# Patient Record
Sex: FEMALE | Race: WHITE | NOT HISPANIC OR LATINO | Employment: OTHER | URBAN - METROPOLITAN AREA
[De-identification: names, ages, dates, MRNs, and addresses within clinical notes are randomized per-mention and may not be internally consistent; named-entity substitution may affect disease eponyms.]

---

## 2022-11-11 ENCOUNTER — HOSPITAL ENCOUNTER (EMERGENCY)
Facility: HOSPITAL | Age: 67
Discharge: HOME | End: 2022-11-11
Attending: FAMILY MEDICINE
Payer: MEDICARE

## 2022-11-11 VITALS
HEIGHT: 62 IN | BODY MASS INDEX: 34.04 KG/M2 | HEART RATE: 79 BPM | DIASTOLIC BLOOD PRESSURE: 85 MMHG | OXYGEN SATURATION: 99 % | SYSTOLIC BLOOD PRESSURE: 135 MMHG | TEMPERATURE: 98 F | RESPIRATION RATE: 18 BRPM | WEIGHT: 185 LBS

## 2022-11-11 DIAGNOSIS — D64.9 ANEMIA, UNSPECIFIED TYPE: Primary | ICD-10-CM

## 2022-11-11 DIAGNOSIS — K92.2 GASTROINTESTINAL HEMORRHAGE, UNSPECIFIED GASTROINTESTINAL HEMORRHAGE TYPE: ICD-10-CM

## 2022-11-11 DIAGNOSIS — R06.02 SOB (SHORTNESS OF BREATH): ICD-10-CM

## 2022-11-11 DIAGNOSIS — R19.5 HEME POSITIVE STOOL: ICD-10-CM

## 2022-11-11 LAB
ABO + RH BLD: NORMAL
ALBUMIN SERPL BCP-MCNC: 3 G/DL (ref 3.5–5.2)
ALP SERPL-CCNC: 66 U/L (ref 55–135)
ALT SERPL W/O P-5'-P-CCNC: 33 U/L (ref 10–44)
ANION GAP SERPL CALC-SCNC: 14 MMOL/L (ref 8–16)
AST SERPL-CCNC: 77 U/L (ref 10–40)
BASOPHILS # BLD AUTO: 0.04 K/UL (ref 0–0.2)
BASOPHILS NFR BLD: 0.7 % (ref 0–1.9)
BILIRUB SERPL-MCNC: 1.1 MG/DL (ref 0.1–1)
BLD GP AB SCN CELLS X3 SERPL QL: NORMAL
BLD PROD TYP BPU: NORMAL
BLOOD UNIT EXPIRATION DATE: NORMAL
BLOOD UNIT TYPE CODE: 600
BLOOD UNIT TYPE: NORMAL
BNP SERPL-MCNC: 96 PG/ML (ref 0–99)
BUN SERPL-MCNC: 15 MG/DL (ref 8–23)
CALCIUM SERPL-MCNC: 9 MG/DL (ref 8.7–10.5)
CHLORIDE SERPL-SCNC: 104 MMOL/L (ref 95–110)
CO2 SERPL-SCNC: 20 MMOL/L (ref 23–29)
CODING SYSTEM: NORMAL
CREAT SERPL-MCNC: 1.6 MG/DL (ref 0.5–1.4)
DIFFERENTIAL METHOD: ABNORMAL
DISPENSE STATUS: NORMAL
EOSINOPHIL # BLD AUTO: 0.2 K/UL (ref 0–0.5)
EOSINOPHIL NFR BLD: 2.5 % (ref 0–8)
ERYTHROCYTE [DISTWIDTH] IN BLOOD BY AUTOMATED COUNT: 18.5 % (ref 11.5–14.5)
EST. GFR  (NO RACE VARIABLE): 34.7 ML/MIN/1.73 M^2
GLUCOSE SERPL-MCNC: 104 MG/DL (ref 70–110)
HCT VFR BLD AUTO: 21.8 % (ref 37–48.5)
HCV AB SERPL QL IA: NORMAL
HGB BLD-MCNC: 7.1 G/DL (ref 12–16)
HIV 1+2 AB+HIV1 P24 AG SERPL QL IA: NORMAL
IMM GRANULOCYTES # BLD AUTO: 0.03 K/UL (ref 0–0.04)
IMM GRANULOCYTES NFR BLD AUTO: 0.5 % (ref 0–0.5)
INR PPP: 1 (ref 0.8–1.2)
LYMPHOCYTES # BLD AUTO: 0.7 K/UL (ref 1–4.8)
LYMPHOCYTES NFR BLD: 11.5 % (ref 18–48)
MCH RBC QN AUTO: 34.3 PG (ref 27–31)
MCHC RBC AUTO-ENTMCNC: 32.6 G/DL (ref 32–36)
MCV RBC AUTO: 105 FL (ref 82–98)
MONOCYTES # BLD AUTO: 0.6 K/UL (ref 0.3–1)
MONOCYTES NFR BLD: 9.5 % (ref 4–15)
NEUTROPHILS # BLD AUTO: 4.5 K/UL (ref 1.8–7.7)
NEUTROPHILS NFR BLD: 75.3 % (ref 38–73)
NRBC BLD-RTO: 0 /100 WBC
NUM UNITS TRANS PACKED RBC: NORMAL
OB PNL STL: POSITIVE
PLATELET # BLD AUTO: 128 K/UL (ref 150–450)
PMV BLD AUTO: 9.1 FL (ref 9.2–12.9)
POTASSIUM SERPL-SCNC: 4.5 MMOL/L (ref 3.5–5.1)
PROT SERPL-MCNC: 6.6 G/DL (ref 6–8.4)
PROTHROMBIN TIME: 10.7 SEC (ref 9–12.5)
RBC # BLD AUTO: 2.07 M/UL (ref 4–5.4)
SODIUM SERPL-SCNC: 138 MMOL/L (ref 136–145)
TROPONIN I SERPL DL<=0.01 NG/ML-MCNC: <0.006 NG/ML (ref 0–0.03)
WBC # BLD AUTO: 5.91 K/UL (ref 3.9–12.7)

## 2022-11-11 PROCEDURE — 71045 X-RAY EXAM CHEST 1 VIEW: CPT | Mod: TC

## 2022-11-11 PROCEDURE — 82272 OCCULT BLD FECES 1-3 TESTS: CPT | Performed by: FAMILY MEDICINE

## 2022-11-11 PROCEDURE — 86901 BLOOD TYPING SEROLOGIC RH(D): CPT | Performed by: FAMILY MEDICINE

## 2022-11-11 PROCEDURE — 71045 X-RAY EXAM CHEST 1 VIEW: CPT | Mod: 26,,, | Performed by: RADIOLOGY

## 2022-11-11 PROCEDURE — 99285 EMERGENCY DEPT VISIT HI MDM: CPT | Mod: 25

## 2022-11-11 PROCEDURE — 83880 ASSAY OF NATRIURETIC PEPTIDE: CPT | Performed by: FAMILY MEDICINE

## 2022-11-11 PROCEDURE — 84484 ASSAY OF TROPONIN QUANT: CPT | Performed by: FAMILY MEDICINE

## 2022-11-11 PROCEDURE — 93005 ELECTROCARDIOGRAM TRACING: CPT

## 2022-11-11 PROCEDURE — 36000 PLACE NEEDLE IN VEIN: CPT | Mod: 59

## 2022-11-11 PROCEDURE — 36415 COLL VENOUS BLD VENIPUNCTURE: CPT | Performed by: FAMILY MEDICINE

## 2022-11-11 PROCEDURE — 87389 HIV-1 AG W/HIV-1&-2 AB AG IA: CPT | Performed by: FAMILY MEDICINE

## 2022-11-11 PROCEDURE — 93010 ELECTROCARDIOGRAM REPORT: CPT | Mod: ,,, | Performed by: INTERNAL MEDICINE

## 2022-11-11 PROCEDURE — 86920 COMPATIBILITY TEST SPIN: CPT | Performed by: FAMILY MEDICINE

## 2022-11-11 PROCEDURE — 85610 PROTHROMBIN TIME: CPT | Performed by: FAMILY MEDICINE

## 2022-11-11 PROCEDURE — P9016 RBC LEUKOCYTES REDUCED: HCPCS | Performed by: FAMILY MEDICINE

## 2022-11-11 PROCEDURE — 71045 XR CHEST AP PORTABLE: ICD-10-PCS | Mod: 26,,, | Performed by: RADIOLOGY

## 2022-11-11 PROCEDURE — 36430 TRANSFUSION BLD/BLD COMPNT: CPT

## 2022-11-11 PROCEDURE — 80053 COMPREHEN METABOLIC PANEL: CPT | Performed by: FAMILY MEDICINE

## 2022-11-11 PROCEDURE — 86803 HEPATITIS C AB TEST: CPT | Performed by: FAMILY MEDICINE

## 2022-11-11 PROCEDURE — 85025 COMPLETE CBC W/AUTO DIFF WBC: CPT | Performed by: FAMILY MEDICINE

## 2022-11-11 PROCEDURE — 93010 EKG 12-LEAD: ICD-10-PCS | Mod: ,,, | Performed by: INTERNAL MEDICINE

## 2022-11-11 RX ORDER — HYDROCODONE BITARTRATE AND ACETAMINOPHEN 500; 5 MG/1; MG/1
TABLET ORAL
Status: DISCONTINUED | OUTPATIENT
Start: 2022-11-11 | End: 2022-11-11 | Stop reason: HOSPADM

## 2022-11-11 RX ORDER — LEVOTHYROXINE SODIUM 150 UG/1
150 TABLET ORAL
COMMUNITY
Start: 2022-06-24 | End: 2023-05-04 | Stop reason: SDUPTHER

## 2022-11-11 RX ORDER — PANTOPRAZOLE SODIUM 40 MG/1
40 TABLET, DELAYED RELEASE ORAL 2 TIMES DAILY
COMMUNITY
Start: 2021-12-13 | End: 2023-12-04 | Stop reason: SDUPTHER

## 2022-11-11 RX ORDER — ASPIRIN 81 MG/1
81 TABLET ORAL
COMMUNITY

## 2022-11-11 RX ORDER — FERROUS GLUCONATE 324(38)MG
324 TABLET ORAL
COMMUNITY
End: 2023-05-04

## 2022-11-11 RX ORDER — ERGOCALCIFEROL 1.25 MG/1
50000 CAPSULE ORAL
COMMUNITY

## 2022-11-11 RX ORDER — POTASSIUM CHLORIDE 750 MG/1
10 TABLET, EXTENDED RELEASE ORAL 2 TIMES DAILY
COMMUNITY
Start: 2022-06-24

## 2022-11-11 NOTE — ED TRIAGE NOTES
[facility], [facility] states that she had lab work done last week. Pt states that the her doctors office told her that she needed to come to the hospital pt states that she had to get some stuff completed before coming. Pt states that she is having weakness. Pt denies any other symptoms at this time.

## 2022-11-11 NOTE — ED PROVIDER NOTES
"Encounter Date: 11/11/2022       History     Chief Complaint   Patient presents with    abnormal labs     67-year-old female presents the ED per EMS status post being told she had abnormal labs is in the [facility] area and was at doctor's office in [place], [place], she states that she is here for blood transfusion "and nothing else", she denies any hematemesis melena "I do have hemorrhoid" she denies any abdominal pain shortness of breath chest pain syncope or near-syncope    Review of patient's allergies indicates:   Allergen Reactions    Penicillins Other (See Comments) and Swelling     Other reaction(s): Unknown  Unknown      Carbidopa-levodopa Nausea And Vomiting    Meperidine Nausea And Vomiting    Morphine Nausea And Vomiting     No past medical history on file.  No past surgical history on file.  No family history on file.     Review of Systems   Constitutional:  Negative for fever.   HENT:  Negative for sore throat.    Respiratory:  Negative for shortness of breath.    Cardiovascular:  Negative for chest pain.   Gastrointestinal:  Negative for nausea.   Genitourinary:  Negative for dysuria.   Musculoskeletal:  Negative for back pain.   Skin:  Negative for rash.   Neurological:  Negative for weakness.   Hematological:  Does not bruise/bleed easily.     Physical Exam     Initial Vitals   BP Pulse Resp Temp SpO2   11/11/22 1003 11/11/22 1003 11/11/22 0957 11/11/22 0957 11/11/22 1003   (!) 118/38 87 18 98.1 °F (36.7 °C) 99 %      MAP       --                Physical Exam    Nursing note and vitals reviewed.  Constitutional: She appears well-developed and well-nourished. She is not diaphoretic. No distress.   HENT:   Head: Normocephalic and atraumatic.   Eyes: Pupils are equal, round, and reactive to light. Right eye exhibits no discharge. Left eye exhibits no discharge.   Neck: No tracheal deviation present. No JVD present.   Cardiovascular:      Exam reveals no friction rub.       No murmur " heard.  Pulmonary/Chest: No stridor. No respiratory distress. She has no wheezes. She has no rales.   Abdominal: Bowel sounds are normal. She exhibits no distension.   Musculoskeletal:         General: Normal range of motion.     Neurological: She is alert.   Skin: Skin is warm.   Psychiatric: She has a normal mood and affect.       ED Course   Procedures  Labs Reviewed   CBC W/ AUTO DIFFERENTIAL - Abnormal; Notable for the following components:       Result Value    RBC 2.07 (*)     Hemoglobin 7.1 (*)     Hematocrit 21.8 (*)      (*)     MCH 34.3 (*)     RDW 18.5 (*)     Platelets 128 (*)     MPV 9.1 (*)     Lymph # 0.7 (*)     Gran % 75.3 (*)     Lymph % 11.5 (*)     All other components within normal limits   COMPREHENSIVE METABOLIC PANEL - Abnormal; Notable for the following components:    CO2 20 (*)     Creatinine 1.6 (*)     Albumin 3.0 (*)     Total Bilirubin 1.1 (*)     AST 77 (*)     eGFR 34.7 (*)     All other components within normal limits   OCCULT BLOOD X 1, STOOL - Abnormal; Notable for the following components:    Occult Blood Positive (*)     All other components within normal limits   B-TYPE NATRIURETIC PEPTIDE   TROPONIN I   PROTIME-INR   HIV 1 / 2 ANTIBODY   HEPATITIS C ANTIBODY   TYPE & SCREEN   PREPARE RBC SOFT          Imaging Results              X-Ray Chest AP Portable (Final result)  Result time 11/11/22 10:43:14      Final result by Jen Durán MD (11/11/22 10:43:14)                   Impression:      No acute abnormality.      Electronically signed by: Jen Durán  Date:    11/11/2022  Time:    10:43               Narrative:    EXAMINATION:  XR CHEST AP PORTABLE    CLINICAL HISTORY:  sob;    TECHNIQUE:  Single frontal view of the chest was performed.    COMPARISON:  None    FINDINGS:  The lungs are clear, with normal appearance of pulmonary vasculature and no pleural effusion or pneumothorax.    The cardiac silhouette is normal in size. The hilar and mediastinal  "contours are unremarkable.    Bones are intact.                                       Medications   0.9%  NaCl infusion (for blood administration) (has no administration in time range)                 ED Course as of 11/11/22 1423   Fri Nov 11, 2022   1221 I discussed with the patient that she had a positive Hemoccult and likely had some level of GI bleeding "I only want to get blood", patient says she will take the blood but refuses to be admitted under any circumstance, "I will not sign out AMA either as they will not pay for the bill" [WK]      ED Course User Index  [WK] Ki Guallpa MD                 Clinical Impression:   Final diagnoses:  [R06.02] SOB (shortness of breath)  [D64.9] Anemia, unspecified type (Primary)  [K92.2] Gastrointestinal hemorrhage, unspecified gastrointestinal hemorrhage type  [R19.5] Heme positive stool        ED Disposition Condition    Discharge Stable          ED Prescriptions    None       Follow-up Information    None          Ki Guallpa MD  11/14/22 1052       Ki Guallpa MD  12/02/22 2335       Ki Guallpa MD  12/02/22 2335    "

## 2023-02-10 ENCOUNTER — OFFICE VISIT (OUTPATIENT)
Dept: FAMILY MEDICINE | Facility: CLINIC | Age: 68
End: 2023-02-10
Payer: MEDICARE

## 2023-02-10 VITALS
OXYGEN SATURATION: 100 % | SYSTOLIC BLOOD PRESSURE: 134 MMHG | HEIGHT: 62 IN | WEIGHT: 179.63 LBS | BODY MASS INDEX: 33.06 KG/M2 | DIASTOLIC BLOOD PRESSURE: 80 MMHG | RESPIRATION RATE: 15 BRPM | HEART RATE: 70 BPM

## 2023-02-10 DIAGNOSIS — K21.9 GASTROESOPHAGEAL REFLUX DISEASE WITHOUT ESOPHAGITIS: ICD-10-CM

## 2023-02-10 DIAGNOSIS — G20.A1 PARKINSON'S DISEASE: Primary | ICD-10-CM

## 2023-02-10 DIAGNOSIS — Z12.12 SCREENING FOR COLORECTAL CANCER: ICD-10-CM

## 2023-02-10 DIAGNOSIS — E87.6 HYPOKALEMIA: ICD-10-CM

## 2023-02-10 DIAGNOSIS — Z13.6 ENCOUNTER FOR LIPID SCREENING FOR CARDIOVASCULAR DISEASE: ICD-10-CM

## 2023-02-10 DIAGNOSIS — Z13.220 ENCOUNTER FOR LIPID SCREENING FOR CARDIOVASCULAR DISEASE: ICD-10-CM

## 2023-02-10 DIAGNOSIS — E03.9 ACQUIRED HYPOTHYROIDISM: ICD-10-CM

## 2023-02-10 DIAGNOSIS — Z12.11 SCREENING FOR COLORECTAL CANCER: ICD-10-CM

## 2023-02-10 DIAGNOSIS — D53.9 MACROCYTIC ANEMIA: ICD-10-CM

## 2023-02-10 PROBLEM — E55.9 VITAMIN D DEFICIENCY: Status: ACTIVE | Noted: 2019-08-28

## 2023-02-10 PROBLEM — G56.03 BILATERAL CARPAL TUNNEL SYNDROME: Status: ACTIVE | Noted: 2019-09-24

## 2023-02-10 PROBLEM — N18.30 CKD (CHRONIC KIDNEY DISEASE) STAGE 3, GFR 30-59 ML/MIN: Status: ACTIVE | Noted: 2022-02-08

## 2023-02-10 PROBLEM — E83.42 HYPOMAGNESEMIA: Status: ACTIVE | Noted: 2021-09-09

## 2023-02-10 PROBLEM — D69.6 PLATELETS DECREASED: Status: ACTIVE | Noted: 2022-07-08

## 2023-02-10 PROBLEM — R26.0 ATAXIC GAIT: Status: ACTIVE | Noted: 2019-09-24

## 2023-02-10 PROBLEM — I48.91 A-FIB: Status: ACTIVE | Noted: 2021-09-28

## 2023-02-10 PROBLEM — C18.9 COLON CANCER: Status: ACTIVE | Noted: 2023-02-10

## 2023-02-10 PROBLEM — F41.9 ANXIETY: Status: ACTIVE | Noted: 2017-09-19

## 2023-02-10 PROBLEM — Z78.0 MENOPAUSE PRESENT: Status: ACTIVE | Noted: 2020-01-07

## 2023-02-10 PROBLEM — I10 ESSENTIAL HYPERTENSION: Status: ACTIVE | Noted: 2017-09-19

## 2023-02-10 PROBLEM — N17.9 ACUTE KIDNEY INJURY: Status: ACTIVE | Noted: 2021-09-09

## 2023-02-10 PROBLEM — M19.90 ARTHRITIS: Status: ACTIVE | Noted: 2017-09-19

## 2023-02-10 PROCEDURE — 99204 OFFICE O/P NEW MOD 45 MIN: CPT | Mod: S$PBB,,, | Performed by: FAMILY MEDICINE

## 2023-02-10 PROCEDURE — 99999 PR PBB SHADOW E&M-EST. PATIENT-LVL III: ICD-10-PCS | Mod: PBBFAC,,, | Performed by: FAMILY MEDICINE

## 2023-02-10 PROCEDURE — 99213 OFFICE O/P EST LOW 20 MIN: CPT | Mod: PBBFAC,PN | Performed by: FAMILY MEDICINE

## 2023-02-10 PROCEDURE — 99999 PR PBB SHADOW E&M-EST. PATIENT-LVL III: CPT | Mod: PBBFAC,,, | Performed by: FAMILY MEDICINE

## 2023-02-10 PROCEDURE — 99204 PR OFFICE/OUTPT VISIT, NEW, LEVL IV, 45-59 MIN: ICD-10-PCS | Mod: S$PBB,,, | Performed by: FAMILY MEDICINE

## 2023-02-10 RX ORDER — ETODOLAC 400 MG/1
400 TABLET, FILM COATED ORAL 2 TIMES DAILY
COMMUNITY
Start: 2022-12-12

## 2023-02-10 RX ORDER — FOLIC ACID 1 MG/1
1000 TABLET ORAL
COMMUNITY
Start: 2023-01-03 | End: 2023-06-28 | Stop reason: SDUPTHER

## 2023-02-10 NOTE — PROGRESS NOTES
Subjective:       Patient ID: Jennifer Hurst is a 67 y.o. female.    Chief Complaint: Establish Care    Patient Active Problem List:     Acquired hypothyroidism     Acute kidney injury     A-fib     Macrocytic anemia     Anxiety     Ataxic gait     Bilateral carpal tunnel syndrome     CKD (chronic kidney disease) stage 3, GFR 30-59 ml/min     Colon cancer     Essential hypertension     Gastroesophageal reflux disease without esophagitis     Hypokalemia     Hypomagnesemia     Menopause present     Parkinson's disease     Platelets decreased     Arthritis     Vitamin D deficiency    Current Outpatient Medications:  aspirin (ECOTRIN) 81 MG EC tablet, Take 81 mg by mouth.  ergocalciferol (ERGOCALCIFEROL) 50,000 unit Cap, Take 50,000 Units by mouth.  etodolac (LODINE) 400 MG tablet, Take 400 mg by mouth 2 (two) times daily.  ferrous gluconate (FERGON) 324 MG tablet, Take 324 mg by mouth.  folic acid (FOLVITE) 1 MG tablet, Take 1,000 mcg by mouth.  levothyroxine (SYNTHROID) 150 MCG tablet, Take 150 mcg by mouth.  pantoprazole (PROTONIX) 40 MG tablet, Take 40 mg by mouth 2 (two) times daily.  potassium chloride (KLOR-CON) 10 MEQ TbSR, Take 10 mEq by mouth 2 (two) times daily.    No current facility-administered medications for this visit.    She has no acute concerns or complaints    Reviewed most recent labs from November and noted that she has had pretty profound anemia and positive occult blood stool. She tells me that she has a hemorrhoid and thinks that this is not related to colon bleeding but rahter nutrition    She has parkinsons, but has been unable to tolerated medication for it.     General debility- 4-5 years ago could walk/run. Now has a very hard time getting around. Cannot get in husbands truck to get to appts. Had to pay someone 100$ to help her get to appt today.     Review of Systems   Constitutional:  Negative for activity change, appetite change, fatigue and fever.   Respiratory:  Negative for  shortness of breath.    Gastrointestinal:  Negative for abdominal pain.   Integumentary:  Negative for rash.   Neurological:  Positive for weakness.       Objective:      Physical Exam  Constitutional:       Appearance: She is obese. She is not ill-appearing.   HENT:      Head: Normocephalic and atraumatic.      Nose: No congestion or rhinorrhea.   Neck:      Vascular: No carotid bruit.   Cardiovascular:      Rate and Rhythm: Normal rate and regular rhythm.      Heart sounds: No murmur heard.  Pulmonary:      Effort: Pulmonary effort is normal. No respiratory distress.      Breath sounds: Normal breath sounds.   Abdominal:      Palpations: Abdomen is soft.   Musculoskeletal:      Cervical back: Normal range of motion.      Comments: Sitting in wheelchair   Neurological:      Mental Status: She is alert.   Psychiatric:         Mood and Affect: Mood normal.         Behavior: Behavior normal.         Thought Content: Thought content normal.         Judgment: Judgment normal.       Assessment:       1. Parkinson's disease    2. Screening for colorectal cancer    3. Encounter for lipid screening for cardiovascular disease    4. Macrocytic anemia    5. Acquired hypothyroidism    6. Gastroesophageal reflux disease without esophagitis    7. Hypokalemia        Plan:       Problem List Items Addressed This Visit          Neuro    Parkinson's disease - Primary     Not currently on medications because it caused her to vomit for 2 weeks         Relevant Orders    Ambulatory referral/consult to Home Health       Renal/    Hypokalemia    Relevant Orders    Comprehensive Metabolic Panel       Oncology    Macrocytic anemia    Relevant Orders    CBC Auto Differential       Endocrine    Acquired hypothyroidism    Relevant Orders    TSH       GI    Gastroesophageal reflux disease without esophagitis     Other Visit Diagnoses       Screening for colorectal cancer        Relevant Orders    Cologuard Screening (Multitarget Stool DNA)     Encounter for lipid screening for cardiovascular disease        Relevant Orders    Lipid Panel              Patient is homebound and requires an incredible amount of effort in order to leave the home. I have sent her a text so that we can provide virtual care when needed and I have re-ordered Home Health for her.

## 2023-02-13 PROCEDURE — G0180 MD CERTIFICATION HHA PATIENT: HCPCS | Mod: ,,, | Performed by: FAMILY MEDICINE

## 2023-02-13 PROCEDURE — G0180 PR HOME HEALTH MD CERTIFICATION: ICD-10-PCS | Mod: ,,, | Performed by: FAMILY MEDICINE

## 2023-02-14 ENCOUNTER — TELEPHONE (OUTPATIENT)
Dept: FAMILY MEDICINE | Facility: CLINIC | Age: 68
End: 2023-02-14
Payer: MEDICARE

## 2023-02-14 NOTE — TELEPHONE ENCOUNTER
----- Message from Jim Liao sent at 2/14/2023  1:15 PM CST -----  Type: Needs Medical Advice  Who Called: Pt   Symptoms (please be specific):   How long has patient had these symptoms:    Pharmacy name and phone #:    Best Call Back Number: [phone]  Additional Information: Pt requesting her [employer] [facility] Home Test be sent to her physical address: [address] [place] [address], [place] [address].

## 2023-02-14 NOTE — TELEPHONE ENCOUNTER
Pt requesting her [employer] guard Home Test be sent to her physical address: [address] [place] [address], [place] [address].

## 2023-02-15 ENCOUNTER — PATIENT OUTREACH (OUTPATIENT)
Dept: ADMINISTRATIVE | Facility: HOSPITAL | Age: 68
End: 2023-02-15
Payer: MEDICARE

## 2023-02-15 ENCOUNTER — TELEPHONE (OUTPATIENT)
Dept: FAMILY MEDICINE | Facility: CLINIC | Age: 68
End: 2023-02-15
Payer: MEDICARE

## 2023-02-15 NOTE — PROGRESS NOTES
Population Health Outreach. Called pt about andersuard number. The pt was requesting the package be sent to a different address than what is [facility]. I gave her the cologuard number [phone], and told her to call them and set up delivery to the address needed.

## 2023-02-15 NOTE — TELEPHONE ENCOUNTER
----- Message from Karen Ling sent at 2/15/2023  1:42 PM CST -----  Contact: PT  Type:  Needs Medical Advice    Who Called: PT   Symptoms (please be specific):   1. PT HAS NOT RECEIVED HER COLOGUARD TEST  2. PT HAS NOT RECEIVED HER RX FOR HER IN WOUND YEAST INFECTION  - BRIGHT RED SPOTS, ITCHY - HOME HEALTH NURSE SAID THAT ITS A YEAST INFECTION  How long has patient had these symptoms:  ITCHY YEAST INFECTION - A FEW WEEKS  Pharmacy name and phone #:    [facility] - [place] - [address]  [address]  [place] [address]  Phone: [phone] Fax: [phone]    Would the patient rather a call back or a response via [facility]? CALL   Best Call Back Number: [phone] (home)     Additional Information: THANK YOU

## 2023-02-15 NOTE — TELEPHONE ENCOUNTER
Cologuard test being looked into by [facility].     What is the location of yeast infection? We did not discuss this at her office visit- I need to know so I know what medication to send for her.

## 2023-02-15 NOTE — TELEPHONE ENCOUNTER
Per patient she has not received her cologuard test.pt has not received RX for wound/yeast infection. Per home health nurse, it is a yeast infection, red bright spots/itchy.Please advise if appt is needed. LOV 2.10.23

## 2023-02-15 NOTE — TELEPHONE ENCOUNTER
Attempted to contact pt. No answer. No voicemail.        I am off tomorrow, please have clinic staff follow up on this matter.

## 2023-02-16 RX ORDER — NYSTATIN 100000 [USP'U]/G
POWDER TOPICAL 2 TIMES DAILY
Qty: 60 G | Refills: 2 | Status: SHIPPED | OUTPATIENT
Start: 2023-02-16 | End: 2023-08-14

## 2023-02-23 LAB — NONINV COLON CA DNA+OCC BLD SCRN STL QL: NORMAL

## 2023-03-01 ENCOUNTER — TELEPHONE (OUTPATIENT)
Dept: FAMILY MEDICINE | Facility: CLINIC | Age: 68
End: 2023-03-01
Payer: MEDICARE

## 2023-03-01 NOTE — TELEPHONE ENCOUNTER
----- Message from Monalisa Campbell sent at 3/1/2023  8:35 AM CST -----  Regarding: Lab Results  Contact: SOLITARIO GOMEZ [phone]  Type:  Test Results    Who Called:  SOLITARIO GOMEZ    Name of Test (Lab/Mammo/Etc):  Labs    Date of Test:  02-23-23    Ordering Provider:  Frannie    Where the test was performed:  [facility]     Best Call Back Number:  [phone] ([place]) [phone]      Additional Information:  Patient is calling office to speak with nurse/MA regarding Lab Results (thyroid)  Please call back and advise. Thanks

## 2023-03-06 ENCOUNTER — EXTERNAL HOME HEALTH (OUTPATIENT)
Dept: HOME HEALTH SERVICES | Facility: HOSPITAL | Age: 68
End: 2023-03-06
Payer: MEDICARE

## 2023-03-06 ENCOUNTER — TELEPHONE (OUTPATIENT)
Dept: FAMILY MEDICINE | Facility: CLINIC | Age: 68
End: 2023-03-06
Payer: MEDICARE

## 2023-03-06 ENCOUNTER — PATIENT MESSAGE (OUTPATIENT)
Dept: FAMILY MEDICINE | Facility: CLINIC | Age: 68
End: 2023-03-06
Payer: MEDICARE

## 2023-03-06 NOTE — TELEPHONE ENCOUNTER
----- Message from Myla Carrillo sent at 3/6/2023  8:53 AM CST -----  Contact: patient  Type:  Test Results    Who Called:  patient  Name of Test (Lab/Mammo/Etc):  labs  Date of Test:    Ordering Provider:  aleta  Where the test was performed:    Best Call Back Number:  [phone]  Additional Information:

## 2023-03-06 NOTE — TELEPHONE ENCOUNTER
Spoke with patient. Patient requesting lab results from blood test completed by Home Health. Informed pt we have not received the results, yet I would reach out to [facility] for the results.   Pt voiced understanding.    please advise if received,

## 2023-03-06 NOTE — TELEPHONE ENCOUNTER
I have received labs. Please let her know the following    Cholesterol panel looks great.   Total cholesterol 172 (Goal < 200)  HDL or good cholesterol 67 (Goal > 40)  Triglycerides 67 (Goal < 150)  LDL or bad cholesterol 85 (Goal < 135)    Thyroid labs show that TSH is low meaning that we should decrease the dose of your synthroid. From the 150 to the 137mcg. Happy to send to your pharmacy- just let me know preferred pharmacy. -Dr. Kathleen- Dr. Kathleen

## 2023-03-15 ENCOUNTER — TELEPHONE (OUTPATIENT)
Dept: FAMILY MEDICINE | Facility: CLINIC | Age: 68
End: 2023-03-15
Payer: MEDICARE

## 2023-03-15 NOTE — TELEPHONE ENCOUNTER
Recommend moisturize and barrier. Okay to use topical hydrocortisone. If no improvement- let me know. -Dr. river

## 2023-03-15 NOTE — TELEPHONE ENCOUNTER
----- Message from Nayeli Copeland sent at 3/15/2023 11:49 AM CDT -----  Regarding: advise  Contact: [facility]    Type: Needs Medical Advice  Who Called:  [facility]  Symptoms (please be specific):    How long has patient had these symptoms:    Pharmacy name and phone #:    Best Call Back Number: [phone]  Additional Information: Patient has a rash on tourso and arms itchy and bleeding.  (Homehealth nurse) [facility].

## 2023-03-15 NOTE — TELEPHONE ENCOUNTER
Spoke with Monalisa with [facility]. Monalisa reports pt has slight raised, red small dot rash on back, torso and arm. Pt is scratching and cause skin to bleed. Please advise if something can be sent in. Pt denied changes to diet and laundry soap.

## 2023-03-16 RX ORDER — TRIAMCINOLONE ACETONIDE 1 MG/G
CREAM TOPICAL 2 TIMES DAILY
Qty: 80 G | Refills: 0 | Status: SHIPPED | OUTPATIENT
Start: 2023-03-16 | End: 2023-03-27 | Stop reason: SDUPTHER

## 2023-03-16 NOTE — TELEPHONE ENCOUNTER
Spoke with edelmira with [facility]. Edelmira states pt has been using topical hydrocortisone for 1 week with no relief. Please advise.

## 2023-03-18 ENCOUNTER — DOCUMENT SCAN (OUTPATIENT)
Dept: HOME HEALTH SERVICES | Facility: HOSPITAL | Age: 68
End: 2023-03-18
Payer: MEDICARE

## 2023-04-17 ENCOUNTER — TELEPHONE (OUTPATIENT)
Dept: OPHTHALMOLOGY | Facility: CLINIC | Age: 68
End: 2023-04-17
Payer: MEDICARE

## 2023-04-17 ENCOUNTER — TELEPHONE (OUTPATIENT)
Dept: FAMILY MEDICINE | Facility: CLINIC | Age: 68
End: 2023-04-17
Payer: MEDICARE

## 2023-04-17 DIAGNOSIS — H25.9 SENILE CATARACT, UNSPECIFIED AGE-RELATED CATARACT TYPE, UNSPECIFIED LATERALITY: Primary | ICD-10-CM

## 2023-04-17 NOTE — TELEPHONE ENCOUNTER
Unable to contact pt or lvm       ----- Message from Jorge Alberto Jimenez sent at 4/17/2023  1:27 PM CDT -----  Pt was referred to Dr. Cabrera early appointment. Pt would like the office to give her a call back              Pt can be reached at [phone]            TY

## 2023-04-17 NOTE — TELEPHONE ENCOUNTER
Attempted to contact Jennifer Hurst to discuss  referral .    Unable to leave message on phone - no voicemail or mailbox full on [phone] (home).    Raven Gutierrez LPN

## 2023-04-17 NOTE — TELEPHONE ENCOUNTER
----- Message from Michele Gamboa sent at 4/17/2023 12:18 PM CDT -----  Type:  Needs Medical Advice    Who Called: Pt  Symptoms (please be specific): Opt referral     Would the patient rather a call back or a response via [facility]? Call back    Best Call Back Number: [phone] or [phone] cell    Additional Information: Sts that she can not get to [place] for an appt needs a referral closer to her .  Please advise -- Thank you

## 2023-04-17 NOTE — TELEPHONE ENCOUNTER
----- Message from Selvin Cook sent at 4/17/2023  1:51 PM CDT -----  Contact: self  Type: Needs Medical Advice  Who Called:  pt    Best Call Back Number: [phone]    Additional Information: [pt states she needs referral for opthalmology in [place]  Thank you

## 2023-04-17 NOTE — TELEPHONE ENCOUNTER
----- Message from Aniyah Helton sent at 4/17/2023  9:29 AM CDT -----  Contact: pt  Type: Needs Medical Advice         Who Called: pt  Best Call Back Number:[phone]  Additional Information: Requesting a call back regarding  pt is asking for a referral for a ophthalmologist for cataracts.  Pt is also asking for office to call her about her Rx.     Please Advise- Thank you

## 2023-04-18 ENCOUNTER — PATIENT MESSAGE (OUTPATIENT)
Dept: FAMILY MEDICINE | Facility: CLINIC | Age: 68
End: 2023-04-18
Payer: MEDICARE

## 2023-04-18 ENCOUNTER — TELEPHONE (OUTPATIENT)
Dept: FAMILY MEDICINE | Facility: CLINIC | Age: 68
End: 2023-04-18
Payer: MEDICARE

## 2023-04-18 DIAGNOSIS — H25.9 SENILE CATARACT, UNSPECIFIED AGE-RELATED CATARACT TYPE, UNSPECIFIED LATERALITY: Primary | ICD-10-CM

## 2023-04-18 LAB — NONINV COLON CA DNA+OCC BLD SCRN STL QL: NEGATIVE

## 2023-04-18 NOTE — TELEPHONE ENCOUNTER
----- Message from Criss Christine sent at 4/18/2023  9:27 AM CDT -----  Consult/Advisory    Name Of Caller:Jennifer      Contact Preference:[phone]    Nature of call: Pt called regarding a referral to see a oph. She wanted to speak to someone about referring here closer to her home.Please call pt to further assist.

## 2023-04-19 NOTE — PROGRESS NOTES
Referral and needed information sent to [facility] at fax  [phone]

## 2023-05-04 ENCOUNTER — OFFICE VISIT (OUTPATIENT)
Dept: FAMILY MEDICINE | Facility: CLINIC | Age: 68
End: 2023-05-04
Payer: MEDICARE

## 2023-05-04 DIAGNOSIS — E03.9 ACQUIRED HYPOTHYROIDISM: ICD-10-CM

## 2023-05-04 DIAGNOSIS — L30.9 DERMATITIS: ICD-10-CM

## 2023-05-04 DIAGNOSIS — L50.8 CHRONIC URTICARIA: ICD-10-CM

## 2023-05-04 PROCEDURE — 99213 OFFICE O/P EST LOW 20 MIN: CPT | Mod: 95,,, | Performed by: FAMILY MEDICINE

## 2023-05-04 PROCEDURE — 99213 PR OFFICE/OUTPT VISIT, EST, LEVL III, 20-29 MIN: ICD-10-PCS | Mod: 95,,, | Performed by: FAMILY MEDICINE

## 2023-05-04 RX ORDER — LEVOTHYROXINE SODIUM 137 UG/1
137 TABLET ORAL
Qty: 90 TABLET | Refills: 3 | Status: SHIPPED | OUTPATIENT
Start: 2023-05-04

## 2023-05-04 RX ORDER — CETIRIZINE HYDROCHLORIDE 10 MG/1
10 TABLET ORAL DAILY
Qty: 90 TABLET | Refills: 0 | Status: SHIPPED | OUTPATIENT
Start: 2023-05-04 | End: 2023-08-14

## 2023-05-04 NOTE — PROGRESS NOTES
The patient location is: [place]  The chief complaint leading to consultation is: Rash and follow up    Visit type: audiovisual    Face to Face time with patient: 15 minutes  15 minutes of total time spent on the encounter, which includes face to face time and non-face to face time preparing to see the patient (eg, review of tests), Obtaining and/or reviewing separately obtained history, Documenting clinical information in the electronic or other health record, Independently interpreting results (not separately reported) and communicating results to the patient/family/caregiver, or Care coordination (not separately reported).         Each patient to whom he or she provides medical services by telemedicine is:  (1) informed of the relationship between the physician and patient and the respective role of any other health care provider with respect to management of the patient; and (2) notified that he or she may decline to receive medical services by telemedicine and may withdraw from such care at any time.    Notes:  Subjective:       Patient ID: Jennifer Hurst is a 67 y.o. female.    Chief Complaint: No chief complaint on file.      She has been having some itching and hives. Skin cream has been helping. She is also using allegra for hives.     No new medications or detergents in that time frame. Uses all free and clear.   Gets improvement with allegra.      She has parkinsons, but has been unable to tolerated medication for it.      General debility    Review of Systems   Constitutional:  Negative for activity change and unexpected weight change.   HENT:  Negative for hearing loss, rhinorrhea and trouble swallowing.    Eyes:  Positive for visual disturbance. Negative for discharge.   Respiratory:  Negative for chest tightness and wheezing.    Cardiovascular:  Negative for chest pain and palpitations.   Gastrointestinal:  Negative for blood in stool, constipation, diarrhea and vomiting.   Endocrine:  Negative for polydipsia and polyuria.   Genitourinary:  Negative for difficulty urinating, dysuria, hematuria and menstrual problem.   Musculoskeletal:  Negative for arthralgias, joint swelling and neck pain.   Neurological:  Negative for weakness and headaches.   Psychiatric/Behavioral:  Negative for confusion and dysphoric mood.        Objective:      Physical Exam  Constitutional:       General: She is not in acute distress.     Appearance: Normal appearance. She is not ill-appearing.   Pulmonary:      Effort: Pulmonary effort is normal. No respiratory distress.   Neurological:      Mental Status: She is alert.   Psychiatric:         Mood and Affect: Mood normal.         Behavior: Behavior normal.         Thought Content: Thought content normal.         Judgment: Judgment normal.       Assessment:       1. Acquired hypothyroidism    2. Dermatitis    3. Chronic urticaria        Plan:       Problem List Items Addressed This Visit          Endocrine    Acquired hypothyroidism    Relevant Medications    levothyroxine (SYNTHROID) 137 MCG Tab tablet     Other Visit Diagnoses       Dermatitis        breaking out on back; relieved with steroid cream    Chronic urticaria        Relevant Medications    cetirizine (ZYRTEC) 10 MG tablet             Follow up in 3 months

## 2023-05-04 NOTE — PATIENT INSTRUCTIONS
Thank you for allowing me to participate in your care today. It is an honor to be a part of your healthcare team at [facility]. If you had labs ordered today, you will receive notification via [facility], phone call or mailed letter regarding your results within 7 days. If you have any questions or concerns regarding your visit today, please do not hesitate to contact us.  Sincerely,   Joleen Kathleen M.D.

## 2023-05-12 ENCOUNTER — NURSE TRIAGE (OUTPATIENT)
Dept: ADMINISTRATIVE | Facility: CLINIC | Age: 68
End: 2023-05-12
Payer: MEDICARE

## 2023-05-12 NOTE — TELEPHONE ENCOUNTER
Pt states that she was taking Allegra for hives and was instructed by provider to take Zyrtec instead. Pt stated that while taking Zyrtec her pulse went up to 112 while sitting. Pt states that she stopped Zyrtec and started Allegra again. Pt to receive callback per protocol. Verbalized understanding. Encounter routed to provider.         Reason for Disposition   Nursing judgment    Protocols used: Information Only Call - No Triage-A-[place]

## 2023-05-15 PROBLEM — N17.9 ACUTE KIDNEY INJURY: Status: RESOLVED | Noted: 2021-09-09 | Resolved: 2023-05-15

## 2023-05-23 RX ORDER — TRIAMCINOLONE ACETONIDE 1 MG/G
CREAM TOPICAL 2 TIMES DAILY
Qty: 80 G | Refills: 0 | Status: SHIPPED | OUTPATIENT
Start: 2023-05-23 | End: 2023-05-30

## 2023-05-23 NOTE — TELEPHONE ENCOUNTER
----- Message from Michelle Mikel sent at 5/23/2023  4:05 PM CDT -----  Contact: pt  Type: medication request      Who Called:  pt  Symptoms (please be specific):  hives and patient was seen recent and told the doctor this works great and wanted to continue to use it and dr said she fill it but then told by pharmacy that dr would not, please call    triamcinolone acetonide 0.1% (KENALOG) 0.1 % cream 80 g 0 4/19/2023 4/26/2023 No  Sig - Route: Apply topically 2 (two) times daily. for 7 days - Topical (Top)      How long has patient had these symptoms:    Pharmacy name and phone #:    [facility] - [place] - [address]  [address]  [place] [address]  Phone: [phone] Fax: [phone]      Best Call Back Number: [phone]  Additional Information:

## 2023-06-28 RX ORDER — FOLIC ACID 1 MG/1
1000 TABLET ORAL DAILY
Qty: 90 TABLET | Refills: 3 | Status: SHIPPED | OUTPATIENT
Start: 2023-06-28

## 2023-06-28 RX ORDER — FOLIC ACID 1 MG/1
1000 TABLET ORAL DAILY
Qty: 30 TABLET | Refills: 0 | Status: SHIPPED | OUTPATIENT
Start: 2023-06-28 | End: 2023-06-28

## 2023-06-28 NOTE — TELEPHONE ENCOUNTER
----- Message from Charissa Medley sent at 6/28/2023 11:17 AM CDT -----  Regarding: RX Refill Request  Contact: patient at [phone]  Type:  RX Refill Request    Who Called:  patient at [phone]    Refill or New Rx:  refill    RX Name and Strength:  folic acid (FOLVITE) 1 MG tablet   1/3/2023    Sig - Route: Take 1,000 mcg by mouth. - Oral   Class: Historical Med     Preferred Pharmacy with phone number:    [facility] - [place], [place] - [address]  [address]  [place] [address]  Phone: [phone] Fax: [phone]    Additional Information:  Patient is almost out of medication and needs a refill. Please call and advise. Thank you

## 2023-07-11 ENCOUNTER — TELEPHONE (OUTPATIENT)
Dept: FAMILY MEDICINE | Facility: CLINIC | Age: 68
End: 2023-07-11
Payer: MEDICARE

## 2023-07-11 DIAGNOSIS — R42 DIZZINESS: Primary | ICD-10-CM

## 2023-07-11 NOTE — TELEPHONE ENCOUNTER
----- Message from Raul Ortiz sent at 7/11/2023  9:53 AM CDT -----  Regarding: Return Call  Contact: patient  Type:  Patient Returning Call    Who Called:patient  Who Left Message for Patient:office nurse  Does the patient know what this is regarding?:condition/ falling/dizzy  Would the patient rather a call back or a response via [facility]? call  Best Call Back Number:[phone]  Additional Information: please call to advise.

## 2023-07-11 NOTE — TELEPHONE ENCOUNTER
Pt requesting home health due to dizziness  at times and weakness/unsteady gait. Referral pended to MD

## 2023-07-12 NOTE — TELEPHONE ENCOUNTER
Attempted to contact Jennifer Hurst to discuss  referral .    Unable to leave message on phone - no voicemail or mailbox full on [phone] (home).    Raven Gutierrez LPN

## 2023-07-12 NOTE — TELEPHONE ENCOUNTER
----- Message from Mercedes Archer, Patient Care Assistant sent at 7/12/2023  9:23 AM CDT -----  Regarding: advice  Contact: pt  Type: Needs Medical Advice    Who Called:  pt     Best Call Back Number: [phone]    Additional Information: pt states she would like a callback regarding orders for home health. Please call pt to advise. Thanks!

## 2023-07-27 ENCOUNTER — OFFICE VISIT (OUTPATIENT)
Dept: FAMILY MEDICINE | Facility: CLINIC | Age: 68
End: 2023-07-27
Payer: MEDICARE

## 2023-07-27 DIAGNOSIS — E03.9 ACQUIRED HYPOTHYROIDISM: ICD-10-CM

## 2023-07-27 DIAGNOSIS — N18.31 STAGE 3A CHRONIC KIDNEY DISEASE: ICD-10-CM

## 2023-07-27 DIAGNOSIS — E87.6 HYPOKALEMIA: ICD-10-CM

## 2023-07-27 DIAGNOSIS — M19.90 ARTHRITIS: ICD-10-CM

## 2023-07-27 DIAGNOSIS — Z13.6 ENCOUNTER FOR LIPID SCREENING FOR CARDIOVASCULAR DISEASE: ICD-10-CM

## 2023-07-27 DIAGNOSIS — G20.A1 PARKINSON'S DISEASE: Primary | ICD-10-CM

## 2023-07-27 DIAGNOSIS — D69.6 PLATELETS DECREASED: ICD-10-CM

## 2023-07-27 DIAGNOSIS — R19.7 DIARRHEA, UNSPECIFIED TYPE: ICD-10-CM

## 2023-07-27 DIAGNOSIS — Z13.220 ENCOUNTER FOR LIPID SCREENING FOR CARDIOVASCULAR DISEASE: ICD-10-CM

## 2023-07-27 PROCEDURE — 99214 OFFICE O/P EST MOD 30 MIN: CPT | Mod: 95,,, | Performed by: FAMILY MEDICINE

## 2023-07-27 PROCEDURE — 99214 PR OFFICE/OUTPT VISIT, EST, LEVL IV, 30-39 MIN: ICD-10-PCS | Mod: 95,,, | Performed by: FAMILY MEDICINE

## 2023-07-27 NOTE — PROGRESS NOTES
The patient location is: [place]  The chief complaint leading to consultation is: debility    Visit type: audiovisual    Face to Face time with patient: 10 minutes  15 minutes of total time spent on the encounter, which includes face to face time and non-face to face time preparing to see the patient (eg, review of tests), Obtaining and/or reviewing separately obtained history, Documenting clinical information in the electronic or other health record, Independently interpreting results (not separately reported) and communicating results to the patient/family/caregiver, or Care coordination (not separately reported).         Each patient to whom he or she provides medical services by telemedicine is:  (1) informed of the relationship between the physician and patient and the respective role of any other health care provider with respect to management of the patient; and (2) notified that he or she may decline to receive medical services by telemedicine and may withdraw from such care at any time.    Notes:  Subjective:       Patient ID: Jennifer Hurst is a 67 y.o. female.    Chief Complaint: No chief complaint on file.      Ms. Susan Hurst presents today for follow up.     She needs a new handicap parking permit.     She is getting weaker and weaker. Unable to leave the home. She cannot get in the shower on her own and is having to do sponge baths. She is seeing me virtually. She cannot get on the stool chair. Her tub is very high and she cannot get over it.     Thinks she needs a blood test for her H/H.     Diarrhea- up to 4-5 times//day. If she takes the imodium it will help. No fever. No belly pain.     Review of Systems   Constitutional:  Positive for activity change. Negative for unexpected weight change.   HENT:  Positive for rhinorrhea. Negative for hearing loss and trouble swallowing.    Eyes:  Positive for visual disturbance. Negative for discharge.   Respiratory:  Negative for chest tightness and  wheezing.    Cardiovascular:  Negative for chest pain and palpitations.   Gastrointestinal:  Positive for diarrhea. Negative for blood in stool, constipation and vomiting.   Endocrine: Negative for polydipsia and polyuria.   Genitourinary:  Negative for difficulty urinating, dysuria, hematuria and menstrual problem.   Musculoskeletal:  Positive for arthralgias and joint swelling. Negative for neck pain.   Neurological:  Positive for weakness. Negative for headaches.   Psychiatric/Behavioral:  Negative for confusion and dysphoric mood.        Objective:      Physical Exam  Constitutional:       General: She is not in acute distress.     Appearance: Normal appearance.   Pulmonary:      Effort: Pulmonary effort is normal. No respiratory distress.   Neurological:      Mental Status: She is alert.   Psychiatric:         Mood and Affect: Mood normal.         Behavior: Behavior normal.         Thought Content: Thought content normal.         Judgment: Judgment normal.       Assessment:       1. Parkinson's disease    2. Arthritis    3. Acquired hypothyroidism    4. Hypokalemia    5. Stage 3a chronic kidney disease    6. Platelets decreased    7. Encounter for lipid screening for cardiovascular disease    8. Diarrhea, unspecified type        Plan:       Problem List Items Addressed This Visit          Neuro    Parkinson's disease - Primary    Relevant Orders    Ambulatory referral/consult to Home Health       Renal/    CKD (chronic kidney disease) stage 3, GFR 30-59 ml/min    Relevant Orders    CBC Auto Differential    Comprehensive Metabolic Panel    Hypokalemia    Relevant Orders    Comprehensive Metabolic Panel       Hematology    Platelets decreased    Relevant Orders    CBC Auto Differential       Endocrine    Acquired hypothyroidism    Relevant Orders    TSH       Orthopedic    Arthritis    Relevant Orders    Ambulatory referral/consult to Home Health     Other Visit Diagnoses       Encounter for lipid screening for  cardiovascular disease        Relevant Orders    Lipid Panel    Diarrhea, unspecified type        Relevant Orders    Occult blood x 1, stool

## 2023-07-28 ENCOUNTER — TELEPHONE (OUTPATIENT)
Dept: FAMILY MEDICINE | Facility: CLINIC | Age: 68
End: 2023-07-28
Payer: MEDICARE

## 2023-07-28 ENCOUNTER — HOSPITAL ENCOUNTER (EMERGENCY)
Facility: HOSPITAL | Age: 68
Discharge: HOME | End: 2023-07-29
Attending: EMERGENCY MEDICINE
Payer: MEDICARE

## 2023-07-28 VITALS
OXYGEN SATURATION: 97 % | HEART RATE: 92 BPM | BODY MASS INDEX: 27.46 KG/M2 | WEIGHT: 155 LBS | SYSTOLIC BLOOD PRESSURE: 128 MMHG | HEIGHT: 63 IN | TEMPERATURE: 98 F | RESPIRATION RATE: 18 BRPM | DIASTOLIC BLOOD PRESSURE: 74 MMHG

## 2023-07-28 DIAGNOSIS — D64.9 SYMPTOMATIC ANEMIA: Primary | ICD-10-CM

## 2023-07-28 DIAGNOSIS — R53.1 WEAKNESS: ICD-10-CM

## 2023-07-28 LAB
ABO + RH BLD: NORMAL
ALBUMIN SERPL BCP-MCNC: 3.2 G/DL (ref 3.5–5.2)
ALP SERPL-CCNC: 70 U/L (ref 55–135)
ALT SERPL W/O P-5'-P-CCNC: 24 U/L (ref 10–44)
ANION GAP SERPL CALC-SCNC: 16 MMOL/L (ref 8–16)
AST SERPL-CCNC: 61 U/L (ref 10–40)
BASOPHILS # BLD AUTO: 0.04 K/UL (ref 0–0.2)
BASOPHILS NFR BLD: 0.9 % (ref 0–1.9)
BILIRUB SERPL-MCNC: 0.9 MG/DL (ref 0.1–1)
BLD GP AB SCN CELLS X3 SERPL QL: NORMAL
BLD PROD TYP BPU: NORMAL
BLOOD UNIT EXPIRATION DATE: NORMAL
BLOOD UNIT TYPE CODE: 600
BLOOD UNIT TYPE: NORMAL
BUN SERPL-MCNC: 20 MG/DL (ref 8–23)
CALCIUM SERPL-MCNC: 9.1 MG/DL (ref 8.7–10.5)
CHLORIDE SERPL-SCNC: 100 MMOL/L (ref 95–110)
CO2 SERPL-SCNC: 15 MMOL/L (ref 23–29)
CODING SYSTEM: NORMAL
CREAT SERPL-MCNC: 1.8 MG/DL (ref 0.5–1.4)
CROSSMATCH INTERPRETATION: NORMAL
DIFFERENTIAL METHOD: ABNORMAL
DISPENSE STATUS: NORMAL
EOSINOPHIL # BLD AUTO: 0.2 K/UL (ref 0–0.5)
EOSINOPHIL NFR BLD: 3.5 % (ref 0–8)
ERYTHROCYTE [DISTWIDTH] IN BLOOD BY AUTOMATED COUNT: 15.3 % (ref 11.5–14.5)
EST. GFR  (NO RACE VARIABLE): 30.1 ML/MIN/1.73 M^2
GLUCOSE SERPL-MCNC: 106 MG/DL (ref 70–110)
HCT VFR BLD AUTO: 19.6 % (ref 37–48.5)
HGB BLD-MCNC: 6.6 G/DL (ref 12–16)
IMM GRANULOCYTES # BLD AUTO: 0.07 K/UL (ref 0–0.04)
IMM GRANULOCYTES NFR BLD AUTO: 1.5 % (ref 0–0.5)
LYMPHOCYTES # BLD AUTO: 1 K/UL (ref 1–4.8)
LYMPHOCYTES NFR BLD: 22.3 % (ref 18–48)
MCH RBC QN AUTO: 37.7 PG (ref 27–31)
MCHC RBC AUTO-ENTMCNC: 33.7 G/DL (ref 32–36)
MCV RBC AUTO: 112 FL (ref 82–98)
MONOCYTES # BLD AUTO: 0.5 K/UL (ref 0.3–1)
MONOCYTES NFR BLD: 10 % (ref 4–15)
NEUTROPHILS # BLD AUTO: 2.8 K/UL (ref 1.8–7.7)
NEUTROPHILS NFR BLD: 61.8 % (ref 38–73)
NRBC BLD-RTO: 0 /100 WBC
NUM UNITS TRANS PACKED RBC: NORMAL
PLATELET # BLD AUTO: 117 K/UL (ref 150–450)
PMV BLD AUTO: 8.2 FL (ref 9.2–12.9)
POTASSIUM SERPL-SCNC: 3.9 MMOL/L (ref 3.5–5.1)
PROT SERPL-MCNC: 6.9 G/DL (ref 6–8.4)
RBC # BLD AUTO: 1.75 M/UL (ref 4–5.4)
SODIUM SERPL-SCNC: 131 MMOL/L (ref 136–145)
SPECIMEN OUTDATE: NORMAL
WBC # BLD AUTO: 4.58 K/UL (ref 3.9–12.7)

## 2023-07-28 PROCEDURE — 86900 BLOOD TYPING SEROLOGIC ABO: CPT | Performed by: EMERGENCY MEDICINE

## 2023-07-28 PROCEDURE — 99284 EMERGENCY DEPT VISIT MOD MDM: CPT | Mod: 25

## 2023-07-28 PROCEDURE — G0180 PR HOME HEALTH MD CERTIFICATION: ICD-10-PCS | Mod: ,,, | Performed by: FAMILY MEDICINE

## 2023-07-28 PROCEDURE — 93010 EKG 12-LEAD: ICD-10-PCS | Mod: ,,, | Performed by: INTERNAL MEDICINE

## 2023-07-28 PROCEDURE — 99291 CRITICAL CARE FIRST HOUR: CPT | Mod: 25

## 2023-07-28 PROCEDURE — 80053 COMPREHEN METABOLIC PANEL: CPT | Performed by: EMERGENCY MEDICINE

## 2023-07-28 PROCEDURE — 36415 COLL VENOUS BLD VENIPUNCTURE: CPT | Mod: 59 | Performed by: EMERGENCY MEDICINE

## 2023-07-28 PROCEDURE — 93010 ELECTROCARDIOGRAM REPORT: CPT | Mod: ,,, | Performed by: INTERNAL MEDICINE

## 2023-07-28 PROCEDURE — 85025 COMPLETE CBC W/AUTO DIFF WBC: CPT | Performed by: EMERGENCY MEDICINE

## 2023-07-28 PROCEDURE — 36430 TRANSFUSION BLD/BLD COMPNT: CPT

## 2023-07-28 PROCEDURE — 86920 COMPATIBILITY TEST SPIN: CPT | Performed by: EMERGENCY MEDICINE

## 2023-07-28 PROCEDURE — P9016 RBC LEUKOCYTES REDUCED: HCPCS | Performed by: EMERGENCY MEDICINE

## 2023-07-28 PROCEDURE — G0180 MD CERTIFICATION HHA PATIENT: HCPCS | Mod: ,,, | Performed by: FAMILY MEDICINE

## 2023-07-28 PROCEDURE — 93005 ELECTROCARDIOGRAM TRACING: CPT

## 2023-07-28 RX ORDER — AZITHROMYCIN 250 MG/1
TABLET, FILM COATED ORAL
Qty: 6 TABLET | Refills: 0 | Status: SHIPPED | OUTPATIENT
Start: 2023-07-28 | End: 2023-08-02

## 2023-07-28 RX ORDER — HYDROCODONE BITARTRATE AND ACETAMINOPHEN 500; 5 MG/1; MG/1
TABLET ORAL
Status: DISCONTINUED | OUTPATIENT
Start: 2023-07-28 | End: 2023-07-29 | Stop reason: HOSPADM

## 2023-07-28 NOTE — TELEPHONE ENCOUNTER
----- Message from Selvin Cook sent at 7/28/2023 11:09 AM CDT -----  Contact: Monalisa  Type: Needs Medical Advice  Who Called:  Monalisa [facility]    Best Call Back Number: [phone]  Additional Information: Pt has a sinus infection Congestion coughing up sputum  Please advise  Thank you    [facility] [place] - [address]  [address]  [place] [address]  Phone: [phone] Fax: [phone]

## 2023-07-29 NOTE — DISCHARGE INSTRUCTIONS
Continue previously prescribed medications and treatments.  Follow-up with your doctor on Monday morning.  Tell her about your anemia and transfusion, and ask her about whether she thinks she needs to see a gastroenterologist or not.  If you do not feel better over the next 2 days, return here for re-evaluation.
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
## 2023-07-31 ENCOUNTER — TELEPHONE (OUTPATIENT)
Dept: FAMILY MEDICINE | Facility: CLINIC | Age: 68
End: 2023-07-31
Payer: MEDICARE

## 2023-07-31 NOTE — TELEPHONE ENCOUNTER
Reviewed ER visit. Noted that she was seen for low hemoglobin - had labs repeated - given 1 unit. Patient did not get occult blood done- but stool sample ordered from home and being brought today.     Does she have a specific question? -Dr. Kathleen

## 2023-07-31 NOTE — TELEPHONE ENCOUNTER
----- Message from Lashell Latonya sent at 7/31/2023  8:06 AM CDT -----  Contact: Self  Patient is calling in regards to her ED visit at [facility], stated she had to go because the Hemoglobin levels was done to 6 and she had to get an infusion, she is needing to speak to the staff regarding what was said and done. Can we please call pt back to discuss at [phone]. Thank You.

## 2023-08-01 ENCOUNTER — TELEPHONE (OUTPATIENT)
Dept: FAMILY MEDICINE | Facility: CLINIC | Age: 68
End: 2023-08-01
Payer: MEDICARE

## 2023-08-01 NOTE — TELEPHONE ENCOUNTER
----- Message from Ronen Cohn sent at 8/1/2023 10:27 AM CDT -----  Type: Needs Medical Advice  Who Called:  pt  Best Call Back Number: [phone]  Additional Information: pt is calling the office to speak with the nurse in regards to the HH therapist. The pt no longer wants this service. Please call back to advise Thanks!

## 2023-08-02 ENCOUNTER — TELEPHONE (OUTPATIENT)
Dept: FAMILY MEDICINE | Facility: CLINIC | Age: 68
End: 2023-08-02
Payer: MEDICARE

## 2023-08-02 ENCOUNTER — PATIENT MESSAGE (OUTPATIENT)
Dept: FAMILY MEDICINE | Facility: CLINIC | Age: 68
End: 2023-08-02
Payer: MEDICARE

## 2023-08-02 NOTE — TELEPHONE ENCOUNTER
----- Message from Lisa Byrnes sent at 8/2/2023 11:01 AM CDT -----  Type:  Test Results    Who Called:  Hafsa with [facility]  Name of Test (Lab/Mammo/Etc):  Stool Cult blood test  Date of Test:  7/31/2023  Where the test was performed:  [facility] Call Back Number:  [phone]  Additional Information:  calling to discuss text results with Dr larios. Hafsa also said that the results were faxed over to the office. Thanks

## 2023-08-02 NOTE — TELEPHONE ENCOUNTER
Needs gastro referral for colonoscopy and possible endoscopy. Please let the patient know and inquire if she has a preferred gastroenterologist.

## 2023-08-03 ENCOUNTER — TELEPHONE (OUTPATIENT)
Dept: FAMILY MEDICINE | Facility: CLINIC | Age: 68
End: 2023-08-03
Payer: MEDICARE

## 2023-08-03 NOTE — TELEPHONE ENCOUNTER
----- Message from Oscar Moore sent at 8/3/2023 11:00 AM CDT -----  Contact: Self  Type: Needs Medical Advice  Who Called:  Patient    Best Call Back Number: [phone]   Additional Information:  Called to discuss blood in stool further. States she does not want c-scope. Would like to discuss other tests for stomach. Please call.

## 2023-08-08 ENCOUNTER — EXTERNAL HOME HEALTH (OUTPATIENT)
Dept: HOME HEALTH SERVICES | Facility: HOSPITAL | Age: 68
End: 2023-08-08
Payer: MEDICARE

## 2023-08-11 ENCOUNTER — DOCUMENT SCAN (OUTPATIENT)
Dept: HOME HEALTH SERVICES | Facility: HOSPITAL | Age: 68
End: 2023-08-11
Payer: MEDICARE

## 2023-08-14 ENCOUNTER — TELEPHONE (OUTPATIENT)
Dept: GASTROENTEROLOGY | Facility: CLINIC | Age: 68
End: 2023-08-14
Payer: MEDICARE

## 2023-08-14 ENCOUNTER — TELEPHONE (OUTPATIENT)
Dept: HEMATOLOGY/ONCOLOGY | Facility: CLINIC | Age: 68
End: 2023-08-14
Payer: MEDICARE

## 2023-08-14 ENCOUNTER — OFFICE VISIT (OUTPATIENT)
Dept: GASTROENTEROLOGY | Facility: CLINIC | Age: 68
End: 2023-08-14
Payer: MEDICARE

## 2023-08-14 VITALS
DIASTOLIC BLOOD PRESSURE: 68 MMHG | BODY MASS INDEX: 27.46 KG/M2 | HEIGHT: 63 IN | SYSTOLIC BLOOD PRESSURE: 112 MMHG | HEART RATE: 88 BPM | WEIGHT: 155 LBS

## 2023-08-14 DIAGNOSIS — R63.0 DECREASED APPETITE: ICD-10-CM

## 2023-08-14 DIAGNOSIS — K43.9 VENTRAL HERNIA WITHOUT OBSTRUCTION OR GANGRENE: ICD-10-CM

## 2023-08-14 DIAGNOSIS — M79.89 SWELLING OF LOWER LEG: ICD-10-CM

## 2023-08-14 DIAGNOSIS — R63.4 WEIGHT LOSS: ICD-10-CM

## 2023-08-14 DIAGNOSIS — K21.9 GASTROESOPHAGEAL REFLUX DISEASE, UNSPECIFIED WHETHER ESOPHAGITIS PRESENT: ICD-10-CM

## 2023-08-14 DIAGNOSIS — Z79.899 LONG-TERM CURRENT USE OF PROTON PUMP INHIBITOR THERAPY: ICD-10-CM

## 2023-08-14 DIAGNOSIS — K92.1 MELENA: ICD-10-CM

## 2023-08-14 DIAGNOSIS — D50.0 IRON DEFICIENCY ANEMIA DUE TO CHRONIC BLOOD LOSS: Primary | ICD-10-CM

## 2023-08-14 PROCEDURE — 99204 PR OFFICE/OUTPT VISIT, NEW, LEVL IV, 45-59 MIN: ICD-10-PCS | Mod: S$PBB,,,

## 2023-08-14 PROCEDURE — 99999 PR PBB SHADOW E&M-EST. PATIENT-LVL IV: CPT | Mod: PBBFAC,,,

## 2023-08-14 PROCEDURE — 99214 OFFICE O/P EST MOD 30 MIN: CPT | Mod: PBBFAC,PN

## 2023-08-14 PROCEDURE — 99204 OFFICE O/P NEW MOD 45 MIN: CPT | Mod: S$PBB,,,

## 2023-08-14 PROCEDURE — 99999 PR PBB SHADOW E&M-EST. PATIENT-LVL IV: ICD-10-PCS | Mod: PBBFAC,,,

## 2023-08-14 NOTE — NURSING
Spoke to Mrs Hurst regarding referral to Hematology patient refused appt she will call when she is ready to schedule       Oncology Navigation   Intake  Cancer Type: Benign hem (MIRELA)  Internal / External Referral: Internal (Chetna Benavidez NP)  Date Worked: 08/14/23     Treatment                              Acuity      Follow Up  No follow-ups on file.

## 2023-08-14 NOTE — PROGRESS NOTES
"Subjective:       Patient ID: Jennifer Hurst is a 67 y.o. female Body mass index is 27.46 kg/m².    Chief Complaint: Anemia    This patient is new to me.     Reviewed hospital ER report from 07/28/23,  "Medical Decision Making:   Differential Diagnosis: Upper GI bleed, lower GI bleed, symptomatic anemia, anemia of chronic disease, bone marrow disorder, dietary deficiencies, etc.  ED Management: Patient's H&H was 6.6 and 19.6 respectively.  She states her stools have been dark occasionally over the last several months.  I suspect a GI source, but patient is adamant that she does not have a GI bleed.  She states she is done a Cologuard test every 6 months and they are always negative.  Patient refuses admission.  She was transfused 1 unit packed red cells here.  Her color now looks better, and she states that she is feeling better.  She promises to follow-up with primary care provider on Monday, and to explore the possibility of seeing a gastroenterologist for further evaluation.  She will return here as needed or if worse in any way."    Anemia  Presents for initial visit. Symptoms include bruises/bleeds easily, leg swelling (hx of HTN), malaise/fatigue and weight loss (Documented weight loss of 24 lb since 02/10/23; she does report decreased appetite). There has been no abdominal pain, anorexia, confusion, fever, light-headedness, pallor, palpitations or pica (eats ice chips). (Patient reports GERD is well-controlled taking Protonix 40 mg b.i.d. she also reports alternating between diarrhea and constipation.  She typically has 1 BM every 2 days rated stool 2 or 5 on [place] scale.  She does take a stool softener p.r.n. when constipated and Imodium to prevent loose stools.  She reports loose stools are typically when she is stressed) Signs of blood loss that are present include melena (Resolved after stopping oral iron supplements; denies taking Pepto-Bismol, but does report taking an 81 mg aspirin). Signs of " blood loss that are not present include hematemesis, hematochezia and vaginal bleeding. Past treatments include oral iron supplements and folic acid (Started iron after ER visit, but discontinued 3-4 days ago when black stools occurred). Past medical history includes hypothyroidism, neuropathy and recent illness (recently went to the ER 07/28/2023 for symptomatic anemia). There is no history of alcohol abuse, cancer, chronic liver disease, chronic renal disease, clotting disorder, dementia, heart failure, hemoglobinopathy, HIV/AIDS, inflammatory bowel disease, malabsorption, malnutrition, recent surgery, recent trauma or rheumatic disease. Procedure history includes colonoscopy (10 years ago), EGD (6 years ago) and FOBT (11/11/2022 occult stool x1 positive). There is no past history of bone marrow exam.     Review of Systems   Constitutional:  Positive for appetite change, fatigue, malaise/fatigue, unexpected weight change and weight loss (Documented weight loss of 24 lb since 02/10/23; she does report decreased appetite). Negative for activity change, chills, diaphoresis and fever.   HENT:  Negative for sore throat and trouble swallowing.    Respiratory:  Negative for cough, choking and shortness of breath.    Cardiovascular:  Negative for chest pain and palpitations.   Gastrointestinal:  Positive for constipation, diarrhea and melena (Resolved after stopping oral iron supplements; denies taking Pepto-Bismol, but does report taking an 81 mg aspirin). Negative for abdominal distention, abdominal pain, anal bleeding, anorexia, blood in stool, hematemesis, hematochezia, nausea, rectal pain and vomiting.   Genitourinary:  Negative for hematuria and vaginal bleeding.   Musculoskeletal:  Positive for arthralgias (patient reports hx of arthritis).   Skin:  Negative for pallor and rash.   Neurological:  Negative for light-headedness.   Hematological:  Bruises/bleeds easily.   Psychiatric/Behavioral:  Negative for  confusion.        No LMP recorded.  Past Medical History:   Diagnosis Date    Anemia, unspecified     Hypothyroidism, unspecified     Parkinson's disease     Unilateral inguinal hernia, without obstruction or gangrene, not specified as recurrent     Patient reposrts she has 3 hernias    Urinary tract infection, site not specified      Past Surgical History:   Procedure Laterality Date    COLON SURGERY      1990 tumor removal    COLONOSCOPY      10 years ago    endometeriosis      UPPER GASTROINTESTINAL ENDOSCOPY      6 years ago     Family History   Problem Relation Age of Onset    Gallbladder disease Mother     Stomach cancer Father     Esophageal cancer Father     Lung cancer Sister     Brain cancer Sister     Colon cancer Neg Hx     Colon polyps Neg Hx      Social History     Tobacco Use    Smoking status: Never     Passive exposure: Current    Smokeless tobacco: Never   Substance Use Topics    Alcohol use: Yes     Comment: couple cocktails a day    Drug use: Never     Wt Readings from Last 10 Encounters:   08/14/23 70.3 kg (155 lb)   07/28/23 70.3 kg (155 lb)   02/10/23 81.5 kg (179 lb 9.6 oz)   11/11/22 83.9 kg (185 lb)     Lab Results   Component Value Date    WBC 4.58 07/28/2023    HGB 6.6 (L) 07/28/2023    HCT 19.6 (LL) 07/28/2023     (H) 07/28/2023     (L) 07/28/2023     CMP  Sodium   Date Value Ref Range Status   07/28/2023 131 (L) 136 - 145 mmol/L Final     Potassium   Date Value Ref Range Status   07/28/2023 3.9 3.5 - 5.1 mmol/L Final     Chloride   Date Value Ref Range Status   07/28/2023 100 95 - 110 mmol/L Final     CO2   Date Value Ref Range Status   07/28/2023 15 (L) 23 - 29 mmol/L Final     Glucose   Date Value Ref Range Status   07/28/2023 106 70 - 110 mg/dL Final     BUN   Date Value Ref Range Status   07/28/2023 20 8 - 23 mg/dL Final     Creatinine   Date Value Ref Range Status   07/28/2023 1.8 (H) 0.5 - 1.4 mg/dL Final     Calcium   Date Value Ref Range Status   07/28/2023 9.1 8.7  - 10.5 mg/dL Final     Total Protein   Date Value Ref Range Status   07/28/2023 6.9 6.0 - 8.4 g/dL Final     Albumin   Date Value Ref Range Status   07/28/2023 3.2 (L) 3.5 - 5.2 g/dL Final     Total Bilirubin   Date Value Ref Range Status   07/28/2023 0.9 0.1 - 1.0 mg/dL Final     Comment:     For infants and newborns, interpretation of results should be based  on gestational age, weight and in agreement with clinical  observations.    Premature Infant recommended reference ranges:  Up to 24 hours.............<8.0 mg/dL  Up to 48 hours............<12.0 mg/dL  3-5 days..................<15.0 mg/dL  6-29 days.................<15.0 mg/dL       Alkaline Phosphatase   Date Value Ref Range Status   07/28/2023 70 55 - 135 U/L Final     AST   Date Value Ref Range Status   07/28/2023 61 (H) 10 - 40 U/L Final     ALT   Date Value Ref Range Status   07/28/2023 24 10 - 44 U/L Final     Anion Gap   Date Value Ref Range Status   07/28/2023 16 8 - 16 mmol/L Final     eGFR    Date Value Ref Range Status   09/13/2021 30 (L) >90 mL/min/1.73m2 Final     Comment:     CKD-EPI GFR INTERPRETATION GUIDELINES  Estimated using CKD-EPI equation based on standardized serum creatinine, age, and sex    DESCRIPTION               eGFR mL/min/1.73m2  Mild Decrease                           60 - 89  Moderate Decrease                       30 - 59  Severe Decrease                         15 - 29  Kidney Failure                          <15 (or dialysis)     eGFR   Date Value Ref Range Status   09/13/2021 26 (L) >90 mL/min/1.73m2 Final     Lab Results   Component Value Date    TSH 0.66 09/11/2021     Lab Results   Component Value Date    IRON 49 (L) 09/10/2021    TIBC 171 (L) 09/10/2021      Lab Results   Component Value Date    FERRITIN 982.4 (H) 09/09/2021     Reviewed prior medical records including radiology report of chest x-ray 11/11/2022, CT abdomen and pelvis 09/08/2021, abdominal ultrasound 06/02/2021 & endoscopy history (see  surgical history).    Objective:      Physical Exam  Vitals and nursing note reviewed.   Constitutional:       General: She is not in acute distress.     Appearance: Normal appearance. She is not ill-appearing.   HENT:      Mouth/Throat:      Lips: Pink. No lesions.   Cardiovascular:      Rate and Rhythm: Normal rate.      Pulses: Normal pulses.   Pulmonary:      Effort: Pulmonary effort is normal. No respiratory distress.   Abdominal:      General: Abdomen is protuberant. Bowel sounds are normal. There is no distension or abdominal bruit. There are no signs of injury.      Palpations: Abdomen is soft. There is no shifting dullness, fluid wave, hepatomegaly, splenomegaly or mass.      Tenderness: There is no abdominal tenderness. There is no guarding or rebound. Negative signs include Gonzalez's sign, Rovsing's sign and McBurney's sign.      Hernia: A hernia is present. Hernia is present in the ventral area.   Skin:     General: Skin is warm and dry.      Coloration: Skin is not jaundiced or pale.   Neurological:      Mental Status: She is alert and oriented to person, place, and time.   Psychiatric:         Attention and Perception: Attention normal.         Mood and Affect: Mood normal.         Speech: Speech normal.         Behavior: Behavior normal.         Assessment:       1. Iron deficiency anemia due to chronic blood loss    2. Melena    3. Gastroesophageal reflux disease, unspecified whether esophagitis present    4. Long-term current use of proton pump inhibitor therapy    5. Weight loss    6. Decreased appetite    7. Ventral hernia without obstruction or gangrene    8. Swelling of lower leg        Plan:       Iron deficiency anemia due to chronic blood loss  - discussed about Colonoscopy & EGD, including the risks and benefits of colonoscopy/EGD, patient verbalized understanding but patient declined colonoscopy/EGD.  - discussed with patient the different ways that anemia occurs: blood loss (such as from  the gi tract), the body is not making enough, or the body is breaking down the rbcs too quickly; recommended EGD and colonoscopy to further evaluate gi tract for possible blood loss, but patient declined  -possible UGI with Small Bowel Follow Through/video capsule study if patient gets EGD/colonoscopy  -follow-up with PCP and/or hematology for continued evaluation and management  -     IRON AND TIBC; Future; Expected date: 08/14/2023  -     Ferritin; Future; Expected date: 08/14/2023  -     Ambulatory referral/consult to Hematology / Oncology; Future; Expected date: 08/21/2023  -     Occult blood x 1, stool; Future; Expected date: 08/14/2023    Melena  - discussed about EGD, including the risks and benefits of EGD, patient verbalized understanding but patient declined EGD.  -     Occult blood x 1, stool; Future; Expected date: 08/14/2023  - Avoid/minimize NSAIDs such as ibuprofen (Advil, Motrin) and naproxen (Aleve and Naprosyn).  - Avoid alcohol.    Gastroesophageal reflux disease, unspecified whether esophagitis present  -Avoid large meals, avoid eating within 2-3 hours of bedtime (avoid late night eating & lying down soon after eating), elevate head of bed if nocturnal symptoms are present, smoking cessation (if current smoker), & weight loss (if overweight).   -Avoid known foods which trigger reflux symptoms & to minimize/avoid high-fat foods, chocolate, caffeine, citrus, alcohol, & tomato products.  -Avoid/limit use of NSAID's, since they can cause GI upset, bleeding, and/or ulcers. If needed, take with food.  -continue Protonix 40 mg b.i.d. for 30 days then try to decrease to once daily  - discussed about EGD, including the risks and benefits of EGD, patient verbalized understanding but patient declined EGD.    Long-term current use of proton pump inhibitor therapy  - preference to use lowest effective dose or discontinuing if possible  - recommend a diet high in calcium and/or taking OTC calcium and vitamin d  supplements as directed (such as Citracal +D)  - recommend annual monitoring with blood work to include CMP, CBC, vitamin B12, and magnesium.  -     CBC Without Differential; Future; Expected date: 08/14/2023  -     Magnesium; Future; Expected date: 08/14/2023  -     Vitamin B12; Future; Expected date: 08/14/2023  -     Comprehensive Metabolic Panel; Future; Expected date: 08/14/2023    Weight loss & Decreased appetite  - discussed about Colonoscopy & EGD, including the risks and benefits of colonoscopy/EGD, patient verbalized understanding but patient declined colonoscopy/EGD.    Ventral hernia without obstruction or gangrene  -recommended follow-up with general surgeon for continued evaluation and management as needed    Swelling of lower leg  -follow-up with PCP and/or cardiology for continued evaluation and management    Follow up in about 4 weeks (around 9/11/2023), or if symptoms worsen or fail to improve.      If no improvement in symptoms or symptoms worsen, call/follow-up at clinic or go to ER.        45 minutes of total time spent on the encounter, which includes face to face time and non-face to face time preparing to see the patient (eg, review of tests), Obtaining and/or reviewing separately obtained history, Documenting clinical information in the electronic or other health record, Independently interpreting results (not separately reported) and communicating results to the patient/family/caregiver, or Care coordination (not separately reported).     A dictation software program was used for this note. Please expect some simple typographical  errors in this note.

## 2023-08-14 NOTE — TELEPHONE ENCOUNTER
Pt seen here in the clinic by NP Chetna Benavidez. Per Chetna, pt declined colonoscopy but schedule EGD. Upon entering room, pt declined EGD as well but asked for stool kit for stool sample. Discussed with Chetna and stool kit was ordered as well. All lab orders printed and given to pt for home health per Chetna's request. Pt verbalized understanding.

## 2023-08-17 ENCOUNTER — TELEPHONE (OUTPATIENT)
Dept: GASTROENTEROLOGY | Facility: CLINIC | Age: 68
End: 2023-08-17
Payer: MEDICARE

## 2023-08-17 NOTE — TELEPHONE ENCOUNTER
----- Message from Adriane Nav sent at 8/17/2023  8:41 AM CDT -----  Contact: Selma from [facility]  Type:  Needs Medical Advice    Who Called: Selma from [facility]  Would the patient rather a call back or a response via [facility]? call  Best Call Back Number:  (office number)     Additional Information: pt has a critical hemoglobin of 6.9. please advise and thank you.

## 2023-08-17 NOTE — TELEPHONE ENCOUNTER
Returned call to dany with . Spoke with Susan and Advised per OSMAN Benavidez, NP  Please let the patient know I recommend them go to the ER if her hgb is 6.9.  She verbalized understanding, and stated she will inform the pt, and will fax over the lab results. No further issues noted.

## 2023-08-22 ENCOUNTER — PATIENT MESSAGE (OUTPATIENT)
Dept: GASTROENTEROLOGY | Facility: CLINIC | Age: 68
End: 2023-08-22
Payer: MEDICARE

## 2023-08-25 ENCOUNTER — DOCUMENT SCAN (OUTPATIENT)
Dept: HOME HEALTH SERVICES | Facility: HOSPITAL | Age: 68
End: 2023-08-25
Payer: MEDICARE

## 2023-08-25 ENCOUNTER — TELEPHONE (OUTPATIENT)
Dept: FAMILY MEDICINE | Facility: CLINIC | Age: 68
End: 2023-08-25
Payer: MEDICARE

## 2023-08-25 NOTE — TELEPHONE ENCOUNTER
----- Message from Karen Ling sent at 8/25/2023  4:16 PM CDT -----  Contact: PT  Type:  Needs Medical Advice    Who Called: PT  Symptoms (please be specific): PT WANTED TO LET THE PROVIDER KNOW THAT SHE HAD TO LET THE  GASTRO NP GO BECAUSE SHE WAS RUDE.   BOWEL MOVEMENT ARE A NORMAL COLOR AND SHE HAS AN EYE DR WILMER SCHEDULED FOR MONDAY 08/28/23    Would the patient rather a call back or a response via [facility]? CALL   Best Call Back Number: [phone]    Additional Information: THANK YOU

## 2023-08-25 NOTE — TELEPHONE ENCOUNTER
PT WANTED TO LET THE PROVIDER KNOW THAT SHE HAD TO LET THE  GASTRO NP GO BECAUSE SHE WAS RUDE.   BOWEL MOVEMENT ARE A NORMAL COLOR AND SHE HAS AN EYE DR GUILLEN SCHEDULED FOR MONDAY 08/28/23

## 2023-08-28 ENCOUNTER — TELEPHONE (OUTPATIENT)
Dept: FAMILY MEDICINE | Facility: CLINIC | Age: 68
End: 2023-08-28
Payer: MEDICARE

## 2023-08-28 NOTE — TELEPHONE ENCOUNTER
----- Message from Nayeli Copeland sent at 8/28/2023  2:00 PM CDT -----  Regarding: advise  Contact: Patient  Type: Needs Medical Advice  Who Called:  Patient   Symptoms (please be specific):  ekg and forms for surgery clearance for sept 21  How long has patient had these symptoms:    Pharmacy name and phone #:      Best Call Back Number: [phone]    Additional Information: Patient is having catrats surgery; please call to advise thanks!

## 2023-08-29 ENCOUNTER — TELEPHONE (OUTPATIENT)
Dept: FAMILY MEDICINE | Facility: CLINIC | Age: 68
End: 2023-08-29
Payer: MEDICARE

## 2023-08-29 NOTE — TELEPHONE ENCOUNTER
----- Message from Raven Gutierrez LPN sent at 8/28/2023  4:52 PM CDT -----  Regarding: FW: Return Call  Contact: patient    ----- Message -----  From: Raul Ortiz  Sent: 8/28/2023   4:47 PM CDT  To: Frannie CEBALLOS Staff  Subject: Return Call                                      Type:  Patient Returning Call    Who Called:patient  Who Left Message for Patient:office nurse  Does the patient know what this is regarding?:needing paper work from office for surgery  Would the patient rather a call back or a response via [facility]? call  Best Call Back Number:[phone]  Additional Information:

## 2023-08-29 NOTE — TELEPHONE ENCOUNTER
----- Message from Karen Ling sent at 8/29/2023  8:58 AM CDT -----  Contact: PT  Type:  Patient Returning Call    Who Called:PT   Who Left Message for Patient:JEFE   Does the patient know what this is regarding?:N/A   Would the patient rather a call back or a response via [facility]? CALL   Best Call Back Number: [phone]  Additional Information: THANK YOU

## 2023-08-29 NOTE — TELEPHONE ENCOUNTER
Attempted to contact Jennifer Hurst to discuss  paperwork/scheduling appt .    Unable to leave message on phone - no voicemail or mailbox full on [phone] (home).    Raven Gutierrez LPN

## 2023-08-30 ENCOUNTER — TELEPHONE (OUTPATIENT)
Dept: FAMILY MEDICINE | Facility: CLINIC | Age: 68
End: 2023-08-30
Payer: MEDICARE

## 2023-08-30 NOTE — TELEPHONE ENCOUNTER
----- Message from Farnaz Rodrigez sent at 8/30/2023  8:35 AM CDT -----  Regarding: eye surgery clearance  Contact: patient  Type:  Patient Returning Call    Who Called:  patient  Who Left Message for Patient:  Raven  Does the patient know what this is regarding?:  yes  Best Call Back Number:   [phone] (home)   Additional Information:Patient has a cataract surgery on 09/21/23. She needs forms filled out and she EKG also. She needs to know if she can drop of the forms to be filled out and get a order for the EKG. Surgery is with [facility]. Please call patient to advise.Thanks!

## 2023-08-30 NOTE — TELEPHONE ENCOUNTER
Attempted to contact Jennifer Hurst to discuss Scheduling an appointment.    Unable to leave message on phone - no voicemail or mailbox full on [phone] (home).    Raven Gutierrez LPN

## 2023-09-01 ENCOUNTER — TELEPHONE (OUTPATIENT)
Dept: FAMILY MEDICINE | Facility: CLINIC | Age: 68
End: 2023-09-01
Payer: MEDICARE

## 2023-09-01 NOTE — TELEPHONE ENCOUNTER
----- Message from Raven Gutierrez LPN sent at 8/31/2023  4:38 PM CDT -----  Contact: PT    ----- Message -----  From: Elaine Telles  Sent: 8/31/2023   4:38 PM CDT  To: Frannie CEBALLOS Staff    Type:  Patient Returning Call    Who Called:  PT  Who Left Message for Patient: Mehnaz  Does the patient know what this is regarding?:  no  Best Call Back Number:  [phone]  Additional Information:  Pt  had missed call

## 2023-09-01 NOTE — TELEPHONE ENCOUNTER
Attempted to contact Jennifer Hurst to discuss Scheduling an appointment.    Unable to leave message on phone - no voicemail or mailbox full on [phone] (home).    Raven Gutierrez LPN

## 2023-09-05 ENCOUNTER — TELEPHONE (OUTPATIENT)
Dept: FAMILY MEDICINE | Facility: CLINIC | Age: 68
End: 2023-09-05
Payer: MEDICARE

## 2023-09-05 NOTE — TELEPHONE ENCOUNTER
----- Message from Michele Gamboa sent at 9/5/2023  8:42 AM CDT -----  Regarding: lab questions  Type:  Needs Medical Advice    Who Called: Pt      Would the patient rather a call back or a response via [facility]? Call back    Best Call Back Number: [phone] or [phone]      Additional Information: Sts that she needs a call back.  Sts that she will be getting discharged from Home health and they need to know if any labs will need to be done prior to discharge.   Sts they are going between 11 and 1130 today.  Please advise -- Thank you

## 2023-09-05 NOTE — TELEPHONE ENCOUNTER
Blood work has been drawn at this time by Monalisa with [facility]. Please advise if H&H is needed. Per Monalisa, extending HH due to pt critical injury

## 2023-09-06 NOTE — TELEPHONE ENCOUNTER
There is a CBC, iron and TIBC, ferritin, Mg, Vit D, CMP all on chart. An H&H is in the CBC. Are they needing additional orders?   critical injury? What happened?

## 2023-09-12 ENCOUNTER — TELEPHONE (OUTPATIENT)
Dept: FAMILY MEDICINE | Facility: CLINIC | Age: 68
End: 2023-09-12
Payer: MEDICARE

## 2023-09-12 NOTE — TELEPHONE ENCOUNTER
Pt does not wish to complete EKG at [facility] or [place]. Please advise if any other outside facility will complete

## 2023-09-12 NOTE — TELEPHONE ENCOUNTER
----- Message from Ronen Cohn sent at 9/12/2023 10:10 AM CDT -----  Type: Needs Medical Advice  Who Called:  pt  Best Call Back Number: [phone]  Additional Information: pt is calling the office to see if the Dr knows anywhere nearby but outside the [facility] system, that will do an EKG. The pt does not want to go to [place] or to [facility]. Please call back to advise Thanks!

## 2023-09-13 ENCOUNTER — OFFICE VISIT (OUTPATIENT)
Dept: FAMILY MEDICINE | Facility: CLINIC | Age: 68
End: 2023-09-13
Payer: MEDICARE

## 2023-09-13 ENCOUNTER — TELEPHONE (OUTPATIENT)
Dept: FAMILY MEDICINE | Facility: CLINIC | Age: 68
End: 2023-09-13
Payer: MEDICARE

## 2023-09-13 VITALS
HEIGHT: 63 IN | BODY MASS INDEX: 34.13 KG/M2 | RESPIRATION RATE: 16 BRPM | HEART RATE: 95 BPM | DIASTOLIC BLOOD PRESSURE: 66 MMHG | OXYGEN SATURATION: 98 % | WEIGHT: 192.63 LBS | SYSTOLIC BLOOD PRESSURE: 128 MMHG

## 2023-09-13 DIAGNOSIS — N18.32 STAGE 3B CHRONIC KIDNEY DISEASE: ICD-10-CM

## 2023-09-13 DIAGNOSIS — R60.0 LOWER EXTREMITY EDEMA: ICD-10-CM

## 2023-09-13 DIAGNOSIS — K21.9 GASTROESOPHAGEAL REFLUX DISEASE WITHOUT ESOPHAGITIS: ICD-10-CM

## 2023-09-13 DIAGNOSIS — D69.6 PLATELETS DECREASED: Primary | ICD-10-CM

## 2023-09-13 DIAGNOSIS — I10 ESSENTIAL HYPERTENSION: ICD-10-CM

## 2023-09-13 DIAGNOSIS — I48.0 PAROXYSMAL ATRIAL FIBRILLATION: ICD-10-CM

## 2023-09-13 DIAGNOSIS — D53.9 MACROCYTIC ANEMIA: ICD-10-CM

## 2023-09-13 PROBLEM — C18.9 COLON CANCER: Status: RESOLVED | Noted: 2023-02-10 | Resolved: 2023-09-13

## 2023-09-13 PROCEDURE — 99213 OFFICE O/P EST LOW 20 MIN: CPT | Mod: PBBFAC,PN | Performed by: FAMILY MEDICINE

## 2023-09-13 PROCEDURE — 99999 PR PBB SHADOW E&M-EST. PATIENT-LVL III: ICD-10-PCS | Mod: PBBFAC,,, | Performed by: FAMILY MEDICINE

## 2023-09-13 PROCEDURE — 99214 PR OFFICE/OUTPT VISIT, EST, LEVL IV, 30-39 MIN: ICD-10-PCS | Mod: S$PBB,,, | Performed by: FAMILY MEDICINE

## 2023-09-13 PROCEDURE — 99999 PR PBB SHADOW E&M-EST. PATIENT-LVL III: CPT | Mod: PBBFAC,,, | Performed by: FAMILY MEDICINE

## 2023-09-13 PROCEDURE — 99214 OFFICE O/P EST MOD 30 MIN: CPT | Mod: S$PBB,,, | Performed by: FAMILY MEDICINE

## 2023-09-13 RX ORDER — FUROSEMIDE 20 MG/1
20 TABLET ORAL 2 TIMES DAILY PRN
Qty: 60 TABLET | Refills: 0 | Status: SHIPPED | OUTPATIENT
Start: 2023-09-13 | End: 2023-11-08

## 2023-09-13 RX ORDER — DOXYCYCLINE 100 MG/1
100 CAPSULE ORAL 2 TIMES DAILY
COMMUNITY
Start: 2023-09-13

## 2023-09-13 RX ORDER — GATIFLOXACIN 5 MG/ML
SOLUTION/ DROPS OPHTHALMIC
COMMUNITY
Start: 2023-09-13 | End: 2023-09-13 | Stop reason: SDUPTHER

## 2023-09-13 RX ORDER — BACITRACIN ZINC AND POLYMYXIN B SULFATE 500; 10000 [USP'U]/G; [USP'U]/G
OINTMENT OPHTHALMIC NIGHTLY
COMMUNITY
Start: 2023-08-28

## 2023-09-13 NOTE — TELEPHONE ENCOUNTER
----- Message from Elaine Telles sent at 9/13/2023  3:35 PM CDT -----  Contact: PT  Type: Needs Medical Advice    Who Called: PT  Best Call Back Number: [phone]  Additional  Information: PT called said head nurse  at [facility], in [place] are looking for lab orders for home draw. Stated they have not received anything asking to please send asap. Please contact pt to,let her know when done  Please Advise- Thank you

## 2023-09-13 NOTE — PROGRESS NOTES
Subjective:      Jennifer Hurst is a 68 y.o. female who presents to the office today for a preoperative consultation at the request of surgeon Dr. Hutchison who plans on performing Right cataract surgery followed by left cataract surgery on September 21 and then another one a month later. This consultation is requested for the specific conditions prompting preoperative evaluation (i.e. because of potential affect on operative risk): Severe anemia, ?GI bleed. Planned anesthesia: local. The patient has the following known anesthesia issues:  none . Patients bleeding risk:  increased .     The following portions of the patient's history were reviewed and updated as appropriate: allergies, current medications, past family history, past medical history, past social history, past surgical history, and problem list.    Review of Systems  Pertinent items are noted in HPI.?      Objective:       Physical Exam  Vitals and nursing note reviewed.   Constitutional:       General: She is not in acute distress.     Appearance: She is obese. She is not ill-appearing.   Eyes:      Comments: Pale conjunctiva   Cardiovascular:      Rate and Rhythm: Normal rate and regular rhythm.      Heart sounds: No murmur heard.  Pulmonary:      Effort: Pulmonary effort is normal.      Breath sounds: Normal breath sounds. No wheezing.   Skin:     General: Skin is warm and dry.      Findings: No rash.   Neurological:      Mental Status: She is alert.   Psychiatric:         Mood and Affect: Mood normal.         Behavior: Behavior normal.         Cardiographics  ECG: low voltage  Normal sinus rhythm      Lab Review   Orders placed for home health to draw     Assessment:        68 y.o. female with planned surgery as above.    Known risk factors for perioperative complications: Anemia  Renal dysfunction      Cardiac Risk Estimation: 1 point on the revised cardiac risk index. Class II (low moderate surgical risk)    Current medications which may  produce withdrawal symptoms if withheld perioperatively: n/a      Plan:      1. Preoperative workup as follows ECG, hemoglobin, hematocrit, electrolytes, creatinine, glucose.  2. Change in medication regimen before surgery: none, continue medication regimen including morning of surgery, with sip of water.  Awaiting labs to complete clearance

## 2023-09-14 ENCOUNTER — TELEPHONE (OUTPATIENT)
Dept: FAMILY MEDICINE | Facility: CLINIC | Age: 68
End: 2023-09-14
Payer: MEDICARE

## 2023-09-14 NOTE — TELEPHONE ENCOUNTER
----- Message from Golria Bergman sent at 9/14/2023  9:09 AM CDT -----  Regarding: Labs  Contact:  Home Care  Needs Medical Advice      Who Called: Brianna thapa/ [facility]      Would the patient rather a call back or a response via [facility]? Call back    Best Call Back Number: [phone]  [phone]    Additional Information: Sts they need to know if any labs need to be drawn on the pt and if so need the orders faxed over.  Next visit is for tomorrow.    Please Advise - Thank you

## 2023-09-15 ENCOUNTER — TELEPHONE (OUTPATIENT)
Dept: FAMILY MEDICINE | Facility: CLINIC | Age: 68
End: 2023-09-15
Payer: MEDICARE

## 2023-09-15 ENCOUNTER — DOCUMENT SCAN (OUTPATIENT)
Dept: HOME HEALTH SERVICES | Facility: HOSPITAL | Age: 68
End: 2023-09-15
Payer: MEDICARE

## 2023-09-15 NOTE — TELEPHONE ENCOUNTER
Pt called to inform office blood work was completed this morning by home health. Informed pt once we receive the results, we will contact her. Pt voiced understanding.

## 2023-09-15 NOTE — TELEPHONE ENCOUNTER
----- Message from Elaine Telles sent at 9/15/2023 10:30 AM CDT -----  Contact: PT  Type: Needs Medical Advice    Who Called: PT  Best Call Back Number: [phone]  Additional  Information: PT requesting for nurse to give her a call  back to discuss some things regarding clearance before  her upcoming  eye surgery.  Please Advise- Thank you

## 2023-09-18 ENCOUNTER — TELEPHONE (OUTPATIENT)
Dept: FAMILY MEDICINE | Facility: CLINIC | Age: 68
End: 2023-09-18
Payer: MEDICARE

## 2023-09-18 NOTE — TELEPHONE ENCOUNTER
----- Message from Oscar Moore sent at 9/18/2023  8:11 AM CDT -----  Contact: Self  Type:  Test Results    Who Called:  Pateint  Name of Test (Lab/Mammo/Etc):  lab  Date of Test:  9/14  Ordering Provider:  Frannie  Where the test was performed:   formerly [facility]  Best Call Back Number:  [phone]   Additional Information:           Natalie East  (RN)  2021 23:59:43

## 2023-09-21 ENCOUNTER — TELEPHONE (OUTPATIENT)
Dept: FAMILY MEDICINE | Facility: CLINIC | Age: 68
End: 2023-09-21
Payer: MEDICARE

## 2023-09-21 DIAGNOSIS — Z78.0 MENOPAUSE: ICD-10-CM

## 2023-09-21 NOTE — TELEPHONE ENCOUNTER
----- Message from Adriane Chopra sent at 9/21/2023  8:09 AM CDT -----  Contact: self  Type:  Needs Medical Advice    Who Called: self    Would the patient rather a call back or a response via [facility]? call  Best Call Back Number: [phone] (home) or [phone]    Additional Information: pt would like nurse to call pt concerning paperwork that needs to be submitted for her to have surgery. Please advise and thank you.

## 2023-09-26 PROCEDURE — G0179 MD RECERTIFICATION HHA PT: HCPCS | Mod: ,,, | Performed by: FAMILY MEDICINE

## 2023-09-26 PROCEDURE — G0179 PR HOME HEALTH MD RECERTIFICATION: ICD-10-PCS | Mod: ,,, | Performed by: FAMILY MEDICINE

## 2023-10-10 ENCOUNTER — EXTERNAL HOME HEALTH (OUTPATIENT)
Dept: HOME HEALTH SERVICES | Facility: HOSPITAL | Age: 68
End: 2023-10-10
Payer: MEDICARE

## 2023-10-16 ENCOUNTER — LAB VISIT (OUTPATIENT)
Dept: LAB | Facility: HOSPITAL | Age: 68
End: 2023-10-16
Attending: FAMILY MEDICINE
Payer: MEDICARE

## 2023-10-16 ENCOUNTER — TELEPHONE (OUTPATIENT)
Dept: FAMILY MEDICINE | Facility: CLINIC | Age: 68
End: 2023-10-16
Payer: MEDICARE

## 2023-10-16 DIAGNOSIS — R39.0 EXTRAVASATION OF URINE: ICD-10-CM

## 2023-10-16 DIAGNOSIS — N18.32 STAGE 3B CHRONIC KIDNEY DISEASE: Primary | ICD-10-CM

## 2023-10-16 DIAGNOSIS — N30.01 ACUTE CYSTITIS WITH HEMATURIA: ICD-10-CM

## 2023-10-16 DIAGNOSIS — R31.9 HEMATURIA, UNSPECIFIED TYPE: Primary | ICD-10-CM

## 2023-10-16 DIAGNOSIS — N18.32 CHRONIC KIDNEY DISEASE (CKD) STAGE G3B/A1, MODERATELY DECREASED GLOMERULAR FILTRATION RATE (GFR) BETWEEN 30-44 ML/MIN/1.73 SQUARE METER AND ALBUMINURIA CREATININE RATIO LESS THAN 30 MG/G: Primary | ICD-10-CM

## 2023-10-16 DIAGNOSIS — R31.9 HEMATURIA, UNSPECIFIED TYPE: ICD-10-CM

## 2023-10-16 LAB
BACTERIA #/AREA URNS HPF: ABNORMAL /HPF
BILIRUB UR QL STRIP: ABNORMAL
CLARITY UR: ABNORMAL
COLOR UR: YELLOW
GLUCOSE UR QL STRIP: NEGATIVE
HGB UR QL STRIP: ABNORMAL
KETONES UR QL STRIP: ABNORMAL
LEUKOCYTE ESTERASE UR QL STRIP: ABNORMAL
MICROSCOPIC COMMENT: ABNORMAL
NITRITE UR QL STRIP: POSITIVE
PH UR STRIP: 6 [PH] (ref 5–8)
PROT UR QL STRIP: NEGATIVE
RBC #/AREA URNS HPF: 9 /HPF (ref 0–4)
SP GR UR STRIP: 1.01 (ref 1–1.03)
SQUAMOUS #/AREA URNS HPF: 2 /HPF
URN SPEC COLLECT METH UR: ABNORMAL
UROBILINOGEN UR STRIP-ACNC: 1 EU/DL
WBC #/AREA URNS HPF: >100 /HPF (ref 0–5)

## 2023-10-16 PROCEDURE — 87088 URINE BACTERIA CULTURE: CPT | Performed by: FAMILY MEDICINE

## 2023-10-16 PROCEDURE — 87186 SC STD MICRODIL/AGAR DIL: CPT | Performed by: FAMILY MEDICINE

## 2023-10-16 PROCEDURE — 87086 URINE CULTURE/COLONY COUNT: CPT | Performed by: FAMILY MEDICINE

## 2023-10-16 PROCEDURE — 87077 CULTURE AEROBIC IDENTIFY: CPT | Performed by: FAMILY MEDICINE

## 2023-10-16 PROCEDURE — 81000 URINALYSIS NONAUTO W/SCOPE: CPT | Performed by: FAMILY MEDICINE

## 2023-10-16 RX ORDER — CIPROFLOXACIN 250 MG/1
250 TABLET, FILM COATED ORAL 2 TIMES DAILY
Qty: 6 TABLET | Refills: 0 | Status: SHIPPED | OUTPATIENT
Start: 2023-10-16 | End: 2023-10-19

## 2023-10-16 NOTE — TELEPHONE ENCOUNTER
----- Message from Sofia Telles sent at 10/16/2023 12:08 PM CDT -----  Regarding: rt call  Contact: Monalisa  Type:  Needs Medical Advice    Who Called: Monalisa with [facility]  Would the patient rather a call back or a response via [facility]? Call back  Best Call Back Number: [phone]  Additional Information: sts she would like a call from the nurse--please advise

## 2023-10-16 NOTE — TELEPHONE ENCOUNTER
"Patient called stating her home health agency wants to run a UA on her for blood in the urine. Mrs persaud also wants to reiterate that the "pansy" antibiotics do not work she needs "something like cipro to clear it up."   "

## 2023-10-16 NOTE — TELEPHONE ENCOUNTER
----- Message from Milagro Lee sent at 10/16/2023  2:57 PM CDT -----  Contact: Rosario  Type: Needs Medical Advice    Who Called:  Rosario thapa [facility]  What is this regarding?:  Pt had a positive urinalysis.  Best Call Back Number:  [phone]  Additional Information:  Please call back at the phone number listed above to advise. Thank you!

## 2023-10-16 NOTE — TELEPHONE ENCOUNTER
Spoke with jessica from [facility].  She states U/A results have been resulted and they were concerning. Please review and advise.

## 2023-10-17 ENCOUNTER — TELEPHONE (OUTPATIENT)
Dept: FAMILY MEDICINE | Facility: CLINIC | Age: 68
End: 2023-10-17
Payer: MEDICARE

## 2023-10-17 NOTE — TELEPHONE ENCOUNTER
There is a urine culture in progress and can be seen under micro. It is standard of care to treat a UTI with 3 days of ciprofloxacin unless it is considered complicated. Also with her reduced renal function with GFR 30 on last check- this antibiotic will be around longer in her system than 3 days. -Dr. Kathleen

## 2023-10-17 NOTE — TELEPHONE ENCOUNTER
Pt called stating her & her HH team are concerned about the duration of the prescription called in yesterday. They are also inquiring about culture results. Please advise if a culture was ordered or pending or if we can get one ordered.

## 2023-10-18 LAB — BACTERIA UR CULT: ABNORMAL

## 2023-10-19 ENCOUNTER — HOSPITAL ENCOUNTER (EMERGENCY)
Facility: HOSPITAL | Age: 68
Discharge: ANOTHER HOSPITAL | End: 2023-10-20
Attending: STUDENT IN AN ORGANIZED HEALTH CARE EDUCATION/TRAINING PROGRAM
Payer: MEDICARE

## 2023-10-19 ENCOUNTER — TELEPHONE (OUTPATIENT)
Dept: FAMILY MEDICINE | Facility: CLINIC | Age: 68
End: 2023-10-19
Payer: MEDICARE

## 2023-10-19 DIAGNOSIS — N39.0 URINARY TRACT INFECTION DUE TO ESBL KLEBSIELLA: Primary | ICD-10-CM

## 2023-10-19 DIAGNOSIS — B96.89 URINARY TRACT INFECTION DUE TO ESBL KLEBSIELLA: Primary | ICD-10-CM

## 2023-10-19 DIAGNOSIS — E87.6 HYPOKALEMIA: ICD-10-CM

## 2023-10-19 DIAGNOSIS — K92.2 GASTROINTESTINAL HEMORRHAGE, UNSPECIFIED GASTROINTESTINAL HEMORRHAGE TYPE: ICD-10-CM

## 2023-10-19 DIAGNOSIS — E83.42 HYPOMAGNESEMIA: ICD-10-CM

## 2023-10-19 DIAGNOSIS — D64.9 ANEMIA, UNSPECIFIED TYPE: ICD-10-CM

## 2023-10-19 LAB
ABO + RH BLD: NORMAL
ALBUMIN SERPL BCP-MCNC: 3.4 G/DL (ref 3.5–5.2)
ALP SERPL-CCNC: 52 U/L (ref 55–135)
ALT SERPL W/O P-5'-P-CCNC: 8 U/L (ref 10–44)
ANION GAP SERPL CALC-SCNC: 19 MMOL/L (ref 8–16)
AST SERPL-CCNC: 35 U/L (ref 10–40)
BACTERIA #/AREA URNS HPF: ABNORMAL /HPF
BASOPHILS # BLD AUTO: 0.04 K/UL (ref 0–0.2)
BASOPHILS NFR BLD: 0.7 % (ref 0–1.9)
BILIRUB SERPL-MCNC: 0.8 MG/DL (ref 0.1–1)
BILIRUB UR QL STRIP: ABNORMAL
BLD GP AB SCN CELLS X3 SERPL QL: NORMAL
BLD PROD TYP BPU: NORMAL
BLOOD UNIT EXPIRATION DATE: NORMAL
BLOOD UNIT TYPE CODE: 600
BLOOD UNIT TYPE: NORMAL
BUN SERPL-MCNC: 15 MG/DL (ref 8–23)
CALCIUM SERPL-MCNC: 8.6 MG/DL (ref 8.7–10.5)
CHLORIDE SERPL-SCNC: 94 MMOL/L (ref 95–110)
CLARITY UR: ABNORMAL
CO2 SERPL-SCNC: 23 MMOL/L (ref 23–29)
CODING SYSTEM: NORMAL
COLOR UR: YELLOW
CREAT SERPL-MCNC: 1.9 MG/DL (ref 0.5–1.4)
CROSSMATCH INTERPRETATION: NORMAL
DIFFERENTIAL METHOD: ABNORMAL
DISPENSE STATUS: NORMAL
EOSINOPHIL # BLD AUTO: 0.1 K/UL (ref 0–0.5)
EOSINOPHIL NFR BLD: 1 % (ref 0–8)
ERYTHROCYTE [DISTWIDTH] IN BLOOD BY AUTOMATED COUNT: 15.2 % (ref 11.5–14.5)
EST. GFR  (NO RACE VARIABLE): 28.1 ML/MIN/1.73 M^2
GLUCOSE SERPL-MCNC: 111 MG/DL (ref 70–110)
GLUCOSE UR QL STRIP: NEGATIVE
HCT VFR BLD AUTO: 18.6 % (ref 37–48.5)
HGB BLD-MCNC: 6.3 G/DL (ref 12–16)
HGB UR QL STRIP: ABNORMAL
IMM GRANULOCYTES # BLD AUTO: 0.04 K/UL (ref 0–0.04)
IMM GRANULOCYTES NFR BLD AUTO: 0.7 % (ref 0–0.5)
KETONES UR QL STRIP: ABNORMAL
LACTATE SERPL-SCNC: 1.2 MMOL/L (ref 0.5–2.2)
LEUKOCYTE ESTERASE UR QL STRIP: ABNORMAL
LYMPHOCYTES # BLD AUTO: 0.7 K/UL (ref 1–4.8)
LYMPHOCYTES NFR BLD: 11.4 % (ref 18–48)
MAGNESIUM SERPL-MCNC: 1 MG/DL (ref 1.6–2.6)
MCH RBC QN AUTO: 36.6 PG (ref 27–31)
MCHC RBC AUTO-ENTMCNC: 33.9 G/DL (ref 32–36)
MCV RBC AUTO: 108 FL (ref 82–98)
MICROSCOPIC COMMENT: ABNORMAL
MONOCYTES # BLD AUTO: 0.7 K/UL (ref 0.3–1)
MONOCYTES NFR BLD: 12.9 % (ref 4–15)
NEUTROPHILS # BLD AUTO: 4.2 K/UL (ref 1.8–7.7)
NEUTROPHILS NFR BLD: 73.3 % (ref 38–73)
NITRITE UR QL STRIP: NEGATIVE
NRBC BLD-RTO: 0 /100 WBC
NUM UNITS TRANS PACKED RBC: NORMAL
OB PNL STL: POSITIVE
PH UR STRIP: 6 [PH] (ref 5–8)
PLATELET # BLD AUTO: 179 K/UL (ref 150–450)
PMV BLD AUTO: 9.6 FL (ref 9.2–12.9)
POTASSIUM SERPL-SCNC: 2.9 MMOL/L (ref 3.5–5.1)
PROT SERPL-MCNC: 7 G/DL (ref 6–8.4)
PROT UR QL STRIP: NEGATIVE
RBC # BLD AUTO: 1.72 M/UL (ref 4–5.4)
RBC #/AREA URNS HPF: 20 /HPF (ref 0–4)
SODIUM SERPL-SCNC: 136 MMOL/L (ref 136–145)
SP GR UR STRIP: 1.01 (ref 1–1.03)
SPECIMEN OUTDATE: NORMAL
URN SPEC COLLECT METH UR: ABNORMAL
UROBILINOGEN UR STRIP-ACNC: 1 EU/DL
WBC # BLD AUTO: 5.72 K/UL (ref 3.9–12.7)
WBC #/AREA URNS HPF: 18 /HPF (ref 0–5)

## 2023-10-19 PROCEDURE — 96361 HYDRATE IV INFUSION ADD-ON: CPT

## 2023-10-19 PROCEDURE — 36430 TRANSFUSION BLD/BLD COMPNT: CPT

## 2023-10-19 PROCEDURE — 81000 URINALYSIS NONAUTO W/SCOPE: CPT | Performed by: STUDENT IN AN ORGANIZED HEALTH CARE EDUCATION/TRAINING PROGRAM

## 2023-10-19 PROCEDURE — 96366 THER/PROPH/DIAG IV INF ADDON: CPT

## 2023-10-19 PROCEDURE — 80053 COMPREHEN METABOLIC PANEL: CPT | Performed by: STUDENT IN AN ORGANIZED HEALTH CARE EDUCATION/TRAINING PROGRAM

## 2023-10-19 PROCEDURE — 87088 URINE BACTERIA CULTURE: CPT | Performed by: STUDENT IN AN ORGANIZED HEALTH CARE EDUCATION/TRAINING PROGRAM

## 2023-10-19 PROCEDURE — 96375 TX/PRO/DX INJ NEW DRUG ADDON: CPT

## 2023-10-19 PROCEDURE — 82272 OCCULT BLD FECES 1-3 TESTS: CPT | Performed by: EMERGENCY MEDICINE

## 2023-10-19 PROCEDURE — 63600175 PHARM REV CODE 636 W HCPCS: Performed by: STUDENT IN AN ORGANIZED HEALTH CARE EDUCATION/TRAINING PROGRAM

## 2023-10-19 PROCEDURE — 36415 COLL VENOUS BLD VENIPUNCTURE: CPT | Performed by: EMERGENCY MEDICINE

## 2023-10-19 PROCEDURE — 96365 THER/PROPH/DIAG IV INF INIT: CPT

## 2023-10-19 PROCEDURE — 86850 RBC ANTIBODY SCREEN: CPT | Performed by: EMERGENCY MEDICINE

## 2023-10-19 PROCEDURE — 25000003 PHARM REV CODE 250: Performed by: STUDENT IN AN ORGANIZED HEALTH CARE EDUCATION/TRAINING PROGRAM

## 2023-10-19 PROCEDURE — 96367 TX/PROPH/DG ADDL SEQ IV INF: CPT

## 2023-10-19 PROCEDURE — 83605 ASSAY OF LACTIC ACID: CPT | Performed by: STUDENT IN AN ORGANIZED HEALTH CARE EDUCATION/TRAINING PROGRAM

## 2023-10-19 PROCEDURE — 63600175 PHARM REV CODE 636 W HCPCS: Performed by: EMERGENCY MEDICINE

## 2023-10-19 PROCEDURE — 87186 SC STD MICRODIL/AGAR DIL: CPT | Performed by: STUDENT IN AN ORGANIZED HEALTH CARE EDUCATION/TRAINING PROGRAM

## 2023-10-19 PROCEDURE — 87040 BLOOD CULTURE FOR BACTERIA: CPT | Performed by: STUDENT IN AN ORGANIZED HEALTH CARE EDUCATION/TRAINING PROGRAM

## 2023-10-19 PROCEDURE — 99285 EMERGENCY DEPT VISIT HI MDM: CPT | Mod: 25

## 2023-10-19 PROCEDURE — 25000003 PHARM REV CODE 250: Performed by: EMERGENCY MEDICINE

## 2023-10-19 PROCEDURE — 83735 ASSAY OF MAGNESIUM: CPT | Performed by: STUDENT IN AN ORGANIZED HEALTH CARE EDUCATION/TRAINING PROGRAM

## 2023-10-19 PROCEDURE — 87077 CULTURE AEROBIC IDENTIFY: CPT | Performed by: STUDENT IN AN ORGANIZED HEALTH CARE EDUCATION/TRAINING PROGRAM

## 2023-10-19 PROCEDURE — P9016 RBC LEUKOCYTES REDUCED: HCPCS | Performed by: EMERGENCY MEDICINE

## 2023-10-19 PROCEDURE — 87086 URINE CULTURE/COLONY COUNT: CPT | Performed by: STUDENT IN AN ORGANIZED HEALTH CARE EDUCATION/TRAINING PROGRAM

## 2023-10-19 PROCEDURE — 86920 COMPATIBILITY TEST SPIN: CPT | Performed by: EMERGENCY MEDICINE

## 2023-10-19 PROCEDURE — 85025 COMPLETE CBC W/AUTO DIFF WBC: CPT | Performed by: STUDENT IN AN ORGANIZED HEALTH CARE EDUCATION/TRAINING PROGRAM

## 2023-10-19 RX ORDER — POTASSIUM CHLORIDE 7.45 MG/ML
10 INJECTION INTRAVENOUS
Status: COMPLETED | OUTPATIENT
Start: 2023-10-19 | End: 2023-10-19

## 2023-10-19 RX ORDER — ONDANSETRON 2 MG/ML
4 INJECTION INTRAMUSCULAR; INTRAVENOUS
Status: COMPLETED | OUTPATIENT
Start: 2023-10-19 | End: 2023-10-19

## 2023-10-19 RX ORDER — ACETAMINOPHEN 500 MG
1000 TABLET ORAL
Status: COMPLETED | OUTPATIENT
Start: 2023-10-19 | End: 2023-10-19

## 2023-10-19 RX ORDER — DIPHENHYDRAMINE HYDROCHLORIDE 50 MG/ML
25 INJECTION INTRAMUSCULAR; INTRAVENOUS
Status: COMPLETED | OUTPATIENT
Start: 2023-10-19 | End: 2023-10-19

## 2023-10-19 RX ORDER — POTASSIUM CHLORIDE 20 MEQ/1
40 TABLET, EXTENDED RELEASE ORAL
Status: DISCONTINUED | OUTPATIENT
Start: 2023-10-19 | End: 2023-10-19

## 2023-10-19 RX ORDER — HYDROCODONE BITARTRATE AND ACETAMINOPHEN 500; 5 MG/1; MG/1
TABLET ORAL
Status: DISCONTINUED | OUTPATIENT
Start: 2023-10-19 | End: 2023-10-20 | Stop reason: HOSPADM

## 2023-10-19 RX ORDER — MAGNESIUM SULFATE HEPTAHYDRATE 40 MG/ML
2 INJECTION, SOLUTION INTRAVENOUS
Status: COMPLETED | OUTPATIENT
Start: 2023-10-19 | End: 2023-10-19

## 2023-10-19 RX ADMIN — ONDANSETRON 4 MG: 2 INJECTION INTRAMUSCULAR; INTRAVENOUS at 05:10

## 2023-10-19 RX ADMIN — SODIUM CHLORIDE 1000 ML: 9 INJECTION, SOLUTION INTRAVENOUS at 05:10

## 2023-10-19 RX ADMIN — POTASSIUM CHLORIDE 10 MEQ: 7.46 INJECTION, SOLUTION INTRAVENOUS at 09:10

## 2023-10-19 RX ADMIN — ERTAPENEM 1 G: 1 INJECTION INTRAMUSCULAR; INTRAVENOUS at 06:10

## 2023-10-19 RX ADMIN — SODIUM CHLORIDE 1000 ML: 9 INJECTION, SOLUTION INTRAVENOUS at 07:10

## 2023-10-19 RX ADMIN — DIPHENHYDRAMINE HYDROCHLORIDE 25 MG: 50 INJECTION INTRAMUSCULAR; INTRAVENOUS at 08:10

## 2023-10-19 RX ADMIN — MAGNESIUM SULFATE HEPTAHYDRATE 2 G: 40 INJECTION, SOLUTION INTRAVENOUS at 07:10

## 2023-10-19 RX ADMIN — ACETAMINOPHEN 1000 MG: 500 TABLET ORAL at 08:10

## 2023-10-19 NOTE — TELEPHONE ENCOUNTER
"Pt notified and states, " I will try to get there but I have to get a ride because they will not pay for [facility] for me"  "

## 2023-10-19 NOTE — TELEPHONE ENCOUNTER
UTI is a very drug resistant organism and per culture that just resulted it was not sensitive to ciprofloxacin. With severe symptoms like vomiting and a bug that will likely require IV medications I recommend that she go to the emergency department for evaluation with her history of sepsis due to UTI and known drug resistant organism.

## 2023-10-19 NOTE — TELEPHONE ENCOUNTER
----- Message from Codycleo Suki sent at 10/19/2023 11:01 AM CDT -----  Type: Needs Medical Advice  Who Called:  pt  Pharmacy name and phone #:    [facility] [place] - [address]  [address]  [place] [address]  Phone: [phone] Fax: [phone]  Best Call Back Number: [phone]  Additional Information: pt has been vomiting and has been having a difficult time keeping anything down. She just finished her antibiotics this morning. Pt states she hasn't really eaten for a month except for a few bites. Nothing from OTC has helped, if fact it makes her worse sometimes. Please call back to advise Thanks!

## 2023-10-19 NOTE — TELEPHONE ENCOUNTER
pt has been vomiting and has been having a difficult time keeping anything down. She just finished her antibiotics this morning. Pt states she hasn't really eaten for a month except for a few bites. Nothing from OTC has helped

## 2023-10-19 NOTE — ED PROVIDER NOTES
Encounter Date: 10/19/2023       History     Chief Complaint   Patient presents with    Sent by MD     Sent by primary MD for drug resistant UTI. Also c/o n/v x 2 weeks     68-year-old female with a history of recurrent UTI and form which she once developed sepsis, was sent to the ED from PCP's office for urine culture collected on 3 days ago on Monday that showed ESBL. She sent to the ED for IV antibiotics.  Patient reports associated nausea, vomiting for the past 2 weeks. She denies dysuria, or specific urinary symptoms. She however tells me that she did not have any urinary symptoms with previous UTI that resulted in sepsis.  She has been taking Cipro for 3 days, urine sensitivities however shows resistance to Cipro.    The history is provided by the patient. No  was used.     Review of patient's allergies indicates:   Allergen Reactions    Penicillins Other (See Comments) and Swelling     Other reaction(s): Unknown  Unknown      Carbidopa-levodopa Nausea And Vomiting    Meperidine Nausea And Vomiting    Morphine Nausea And Vomiting     Past Medical History:   Diagnosis Date    Anemia, unspecified     Hypothyroidism, unspecified     Parkinson's disease     Unilateral inguinal hernia, without obstruction or gangrene, not specified as recurrent     Patient reposrts she has 3 hernias    Urinary tract infection, site not specified      Past Surgical History:   Procedure Laterality Date    COLON SURGERY      1990 tumor removal    COLONOSCOPY      10 years ago    endometeriosis      UPPER GASTROINTESTINAL ENDOSCOPY      6 years ago     Family History   Problem Relation Age of Onset    Gallbladder disease Mother     Stomach cancer Father     Esophageal cancer Father     Lung cancer Sister     Brain cancer Sister     Colon cancer Neg Hx     Colon polyps Neg Hx      Social History     Tobacco Use    Smoking status: Never     Passive exposure: Current    Smokeless tobacco: Never   Substance Use Topics     Alcohol use: Yes     Comment: couple cocktails a day    Drug use: Never     Review of Systems   Constitutional: Negative.    HENT: Negative.     Eyes: Negative.    Respiratory: Negative.     Cardiovascular: Negative.    Gastrointestinal:  Positive for nausea and vomiting.   Genitourinary: Negative.    Musculoskeletal: Negative.    Skin: Negative.    Allergic/Immunologic: Negative.    Neurological: Negative.    Hematological: Negative.    Psychiatric/Behavioral: Negative.     All other systems reviewed and are negative.      Physical Exam     Initial Vitals   BP Pulse Resp Temp SpO2   10/19/23 1631 10/19/23 1628 10/19/23 1628 10/19/23 1628 10/19/23 1628   101/64 104 20 97.9 °F (36.6 °C) 98 %      MAP       --                Physical Exam    Nursing note and vitals reviewed.  Constitutional: She appears well-developed.   HENT:   Head: Normocephalic.   Eyes: Pupils are equal, round, and reactive to light.   Neck:   Normal range of motion.  Cardiovascular:            Tachycardia up to 104   Pulmonary/Chest: Breath sounds normal.   Abdominal: Abdomen is soft. Bowel sounds are normal. She exhibits no distension. There is no abdominal tenderness.   Musculoskeletal:         General: Normal range of motion.      Cervical back: Normal range of motion.     Neurological: She is alert and oriented to person, place, and time. GCS score is 15. GCS eye subscore is 4. GCS verbal subscore is 5. GCS motor subscore is 6.   Skin: Skin is warm. Capillary refill takes less than 2 seconds.   Psychiatric: She has a normal mood and affect.         ED Course   Procedures  Labs Reviewed   CBC W/ AUTO DIFFERENTIAL - Abnormal; Notable for the following components:       Result Value    RBC 1.72 (*)     Hemoglobin 6.3 (*)     Hematocrit 18.6 (*)      (*)     MCH 36.6 (*)     RDW 15.2 (*)     Immature Granulocytes 0.7 (*)     Lymph # 0.7 (*)     Gran % 73.3 (*)     Lymph % 11.4 (*)     All other components within normal limits     Narrative:        Hct critical result(s) called and verbal readback obtained from   Jessica Castaneda RN  by RA 10/19/2023 18:32   COMPREHENSIVE METABOLIC PANEL - Abnormal; Notable for the following components:    Potassium 2.9 (*)     Chloride 94 (*)     Glucose 111 (*)     Creatinine 1.9 (*)     Calcium 8.6 (*)     Albumin 3.4 (*)     Alkaline Phosphatase 52 (*)     ALT 8 (*)     eGFR 28.1 (*)     Anion Gap 19 (*)     All other components within normal limits   URINALYSIS, REFLEX TO URINE CULTURE - Abnormal; Notable for the following components:    Appearance, UA Hazy (*)     Ketones, UA 1+ (*)     Bilirubin (UA) 3+ (*)     Occult Blood UA 2+ (*)     Leukocytes, UA 2+ (*)     All other components within normal limits    Narrative:     Preferred Collection Type->Urine, Clean Catch  Specimen Source->Urine   MAGNESIUM - Abnormal; Notable for the following components:    Magnesium 1.0 (*)     All other components within normal limits   OCCULT BLOOD X 1, STOOL - Abnormal; Notable for the following components:    Occult Blood Positive (*)     All other components within normal limits   URINALYSIS MICROSCOPIC - Abnormal; Notable for the following components:    RBC, UA 20 (*)     WBC, UA 18 (*)     Bacteria Many (*)     All other components within normal limits    Narrative:     Preferred Collection Type->Urine, Clean Catch  Specimen Source->Urine   CULTURE, BLOOD   CULTURE, BLOOD   CULTURE, URINE   LACTIC ACID, PLASMA   MAGNESIUM   TYPE & SCREEN   PREPARE RBC SOFT          Imaging Results    None          Medications   0.9%  NaCl infusion (for blood administration) (has no administration in time range)   magnesium sulfate 2g in water 50mL IVPB (premix) (2 g Intravenous New Bag 10/19/23 1903)   potassium chloride 10 mEq in 100 mL IVPB (has no administration in time range)   sodium chloride 0.9% bolus 1,000 mL 1,000 mL (0 mLs Intravenous Stopped 10/19/23 1833)   ondansetron injection 4 mg (4 mg Intravenous Given 10/19/23  6000)   ertapenem (INVANZ) 1 g in sodium chloride 0.9 % 100 mL IVPB (MB+) (0 g Intravenous Stopped 10/19/23 1850)   acetaminophen tablet 1,000 mg (1,000 mg Oral Given 10/19/23 2011)   diphenhydrAMINE injection 25 mg (25 mg Intravenous Given 10/19/23 2011)   sodium chloride 0.9% bolus 1,000 mL 1,000 mL (1,000 mLs Intravenous New Bag 10/19/23 1917)     Medical Decision Making  68-year-old female with a history of recurrent UTI and form which she once developed sepsis, was sent to the ED from PCP's office for urine culture collected on 3 days ago on Monday that showed ESBL. She sent to the ED for IV antibiotics.   -------  Vital signs stable; slightly tachycardic, afebrile.  Urine culture with sensitivities re-evaluated, shows sensitivities to Invanz, Bactrim, others  We will give a dose of Invanz and then plan to discharge patient to follow up with primary care physician.  Discussed with the PCP via secure text considering above plan was in agreement.    Rest of lab work pending, case discussed with oncoming attending Dr. Beckman the shift change who will follow up with disposition of patient     Pt anemic with heme positive brown stool on exam. Transfusing 1U PRBC. She will need transfer for GI eval. She has ESBL UTI and was given dose of Invanz. Mg 1.0. She was given 2g IV. K 2.9. She declined PO. Given 10meq IV. BP has been low and was given 2L NS and then the 1U PRBC. BP improved.     Amount and/or Complexity of Data Reviewed  External Data Reviewed: labs.  Labs: ordered. Decision-making details documented in ED Course.  Radiology:  Decision-making details documented in ED Course.    Risk  OTC drugs.  Prescription drug management.               ED Course as of 10/19/23 2029   Thu Oct 19, 2023   1802 Signed out to me by Dr Schreiber. Pt here at direction of her PCP for ESBL UTI. Pt without sepsis. Plan per Dr Schreiber is 1 dose of Invanz and refer back to her PCP for outpatient PICC and Invanz.  [DC]      ED Course User  Index  [DC] Michele Raza Jr., MD                    Clinical Impression:   Final diagnoses:  [N39.0, B96.89] Urinary tract infection due to ESBL Klebsiella (Primary)  [D64.9] Anemia, unspecified type  [K92.2] Gastrointestinal hemorrhage, unspecified gastrointestinal hemorrhage type  [E83.42] Hypomagnesemia  [E87.6] Hypokalemia        ED Disposition Condition    Transfer to Another Facility Stable                Michele Raza Jr., MD  10/19/23 2029

## 2023-10-20 VITALS
RESPIRATION RATE: 11 BRPM | BODY MASS INDEX: 28.35 KG/M2 | WEIGHT: 160 LBS | SYSTOLIC BLOOD PRESSURE: 108 MMHG | HEART RATE: 86 BPM | DIASTOLIC BLOOD PRESSURE: 52 MMHG | OXYGEN SATURATION: 98 % | HEIGHT: 63 IN | TEMPERATURE: 99 F

## 2023-10-20 NOTE — ED NOTES
Pt repositioned.  Warm blanket  pillow  call bell in reach lights on  tv on.  No other needs expressed at this time

## 2023-10-20 NOTE — ED NOTES
Pt resting comfortably  on phone  . Call [facility]  rails up.  No needs to be addressed at this time per pt.

## 2023-10-20 NOTE — ED NOTES
Report from clifton at bedside.  Patient recognizes me from previous visit.  Communication established.  Mag started  call bell in reach., rails up   at bedside  tv on  lights on.  No other needs expressed at this time.

## 2023-10-23 LAB — BACTERIA UR CULT: ABNORMAL

## 2023-10-24 ENCOUNTER — TELEPHONE (OUTPATIENT)
Dept: FAMILY MEDICINE | Facility: CLINIC | Age: 68
End: 2023-10-24
Payer: MEDICARE

## 2023-10-24 ENCOUNTER — NURSE TRIAGE (OUTPATIENT)
Dept: ADMINISTRATIVE | Facility: CLINIC | Age: 68
End: 2023-10-24
Payer: MEDICARE

## 2023-10-24 DIAGNOSIS — N39.0 UTI DUE TO EXTENDED-SPECTRUM BETA LACTAMASE (ESBL) PRODUCING ESCHERICHIA COLI: Primary | ICD-10-CM

## 2023-10-24 DIAGNOSIS — Z16.12 UTI DUE TO EXTENDED-SPECTRUM BETA LACTAMASE (ESBL) PRODUCING ESCHERICHIA COLI: Primary | ICD-10-CM

## 2023-10-24 DIAGNOSIS — B96.29 UTI DUE TO EXTENDED-SPECTRUM BETA LACTAMASE (ESBL) PRODUCING ESCHERICHIA COLI: Primary | ICD-10-CM

## 2023-10-24 LAB
BACTERIA BLD CULT: NORMAL
BACTERIA BLD CULT: NORMAL

## 2023-10-24 NOTE — TELEPHONE ENCOUNTER
Pt reports she is having allergic reaction to d.c abx given. Pt reports facial, neck and throat numbness. Pt advised to call 911. Pt refused. Pt requesting alternative abx

## 2023-10-24 NOTE — TELEPHONE ENCOUNTER
----- Message from Myla Carrillo sent at 10/24/2023 12:26 PM CDT -----  Contact: patient  Type: Needs Medical Advice  Who Called:  patient  Symptoms (please be specific):  allergic reaction to medication, throat is swelling, having trouble talking  Pharmacy name and phone #:    [facility] - [place] - [address]  [address]  [place] [address]  Phone: [phone] Fax: [phone]  Best Call Back Number: [phone]  Additional Information: patient took a benadryl, refuses to go to hospital, wants a different antibiotic for UTI- please advise.

## 2023-10-24 NOTE — TELEPHONE ENCOUNTER
----- Message from Myla Carrillo sent at 10/24/2023 12:26 PM CDT -----  Contact: patient  Type: Needs Medical Advice  Who Called:  patient  Symptoms (please be specific):  allergic reaction to medication, throat is swelling, having trouble talking  Pharmacy name and phone #:    [facility] - [place] - [address]  [address]  [place] [address]  Phone: [phone] Fax: [phone]  Best Call Back Number: [phone]  Additional Information: patient took a benadryl, refuses to go to hospital, wants a different antibiotic for UTI- please advise.

## 2023-10-24 NOTE — TELEPHONE ENCOUNTER
Spoke with patient. Pt reports and her throat is swelling and she said that she is having an allergic reaction to the medication she was given at discharge. Pt triaged and told to call 911 but refusing wants home care advice and I told her she needs to call 911. She said that she is not calling 911 and will take Benadryl, has taken 2 already and feels she is talking better. Again, pt informed to contact 911

## 2023-10-24 NOTE — TELEPHONE ENCOUNTER
Agree that if patient having allergic reaction needs to be seen by urgent care or ED.    Please confirm with patient that her discharge antibiotic was macrodantin as I am able to see some information in care everywhere.     She has a highly resistant urinary infection and if she cannot tolerate PO medication she will likely require IV medication.

## 2023-10-24 NOTE — TELEPHONE ENCOUNTER
Pt calling with  saying that his wife was given medication on D/C and her throat is swelling and she said that she is having an allergic reaction. Pt triaged and told to call 911 but refusing wants home care advice and I told her she needs to call 911. She said that she is not calling 911 and will take Benadryl. Pt advised to call and again refused and I will route message to provider.                  Reason for Disposition   Wheezing, stridor, hoarseness, or difficulty breathing    Additional Information   Life-threatening allergic reaction (anaphylaxis) suspected   Negative: [1] Life-threatening reaction in the past to similar substance (e.g., food, insect bite/sting, medication, etc.) AND [2] < 2 hours since exposure    Protocols used: Allergic Reactions - Guideline [id number], [id number]

## 2023-10-25 ENCOUNTER — TELEPHONE (OUTPATIENT)
Dept: FAMILY MEDICINE | Facility: CLINIC | Age: 68
End: 2023-10-25
Payer: MEDICARE

## 2023-10-25 DIAGNOSIS — N39.0 UTI DUE TO EXTENDED-SPECTRUM BETA LACTAMASE (ESBL) PRODUCING ESCHERICHIA COLI: Primary | ICD-10-CM

## 2023-10-25 DIAGNOSIS — N18.32 STAGE 3B CHRONIC KIDNEY DISEASE: ICD-10-CM

## 2023-10-25 DIAGNOSIS — B96.29 UTI DUE TO EXTENDED-SPECTRUM BETA LACTAMASE (ESBL) PRODUCING ESCHERICHIA COLI: Primary | ICD-10-CM

## 2023-10-25 DIAGNOSIS — Z16.12 UTI DUE TO EXTENDED-SPECTRUM BETA LACTAMASE (ESBL) PRODUCING ESCHERICHIA COLI: Primary | ICD-10-CM

## 2023-10-25 RX ORDER — ERTAPENEM 1 G/1
1 INJECTION, POWDER, LYOPHILIZED, FOR SOLUTION INTRAMUSCULAR; INTRAVENOUS DAILY
Qty: 7 G | Refills: 0 | Status: SHIPPED | OUTPATIENT
Start: 2023-10-25 | End: 2023-10-25

## 2023-10-25 RX ORDER — SULFAMETHOXAZOLE AND TRIMETHOPRIM 800; 160 MG/1; MG/1
1 TABLET ORAL 2 TIMES DAILY
Qty: 14 TABLET | Refills: 0 | Status: SHIPPED | OUTPATIENT
Start: 2023-10-25 | End: 2023-11-01

## 2023-10-25 NOTE — TELEPHONE ENCOUNTER
----- Message from Monalisa Campbell sent at 10/25/2023  1:25 PM CDT -----  Regarding: NEW RX  Contact: SOLITARIO GOMEZ [phone]    Type: Needs Medical Advice      Who Called:  SOLITARIO GOMEZ Call Back Number: [phone]    Symptoms (please be specific):  Allergic Reaction    Pharmacy:   [facility] - [place] - [address]  [address]  [place] [address]  Phone: [phone] Fax: [phone]    Additional: Patient is calling to speak with nurse regarding new Rx for antibiotic. Requesting if different medication can be called to pharmacy other than one prescribed from hospital.  Advised by physician  to back to ER on yesterday, Pt refused.    Please call back and advise. Thanks

## 2023-10-25 NOTE — TELEPHONE ENCOUNTER
Patient will need PICC line insertion and Invanz to treat. Dose is 1g IV qd x 7 days due to treatment during hospital stay. I have contacted [facility]. PICC Order signed. Awaiting next steps re: getting invanz at home.

## 2023-10-25 NOTE — TELEPHONE ENCOUNTER
Spoke with Brianna at [facility]   Canceled Picc line order  BMP order to draw on Friday.     Pt notified and voiced understanding.

## 2023-10-25 NOTE — TELEPHONE ENCOUNTER
Please coordinate with patient and [facility] to get PICC line placement set up. I have sent IV antibiotic order to [facility]. - Dr. Kathleen

## 2023-10-25 NOTE — TELEPHONE ENCOUNTER
Cancel PICC line orders.   Canceled Invanz at pharmacy.   Please call [facility] and cancel orders for PICC care  Please call [facility] and give order for BMP to be drawn  Friday.      Patient has significantly impacted renal function with creatinine clearance just over 30. Bactrim is notoriously hard on the kidneys. I will rx Bactrim, but please make sure she is aware that this could potentially not fully clear her E. Coli infection and could cause additional kidney impairment and is not my first line recommendation.

## 2023-10-25 NOTE — TELEPHONE ENCOUNTER
----- Message from Karen Ling sent at 10/25/2023  1:46 PM CDT -----  Contact: PT  Type:  Patient Returning Call    Who Called:PT   Who Left Message for Patient:JEFE  Does the patient know what this is regarding?:N/A  Would the patient rather a call back or a response via [facility]? CALL   Best Call Back Number:[phone] (home)   Additional Information: THANK YOU

## 2023-10-25 NOTE — TELEPHONE ENCOUNTER
----- Message from Kadie Morel sent at 10/25/2023 12:59 PM CDT -----  Contact: Maribel from [facility]  Type:  Pharmacy Calling to Clarify an RX    Name of Caller:  MARIBEL from [facility]    Prescription Name:  ertapenem (INVANZ) 1 gram injection    What do they need to clarify?:  Documentation    Best Call Back Number:  [phone] - [facility]    Additional Information:   States she needs to speak with nurse and also needs documentation on this patient to process this - please call to discuss - thank you

## 2023-10-25 NOTE — TELEPHONE ENCOUNTER
----- Message from Nayeli Copeland sent at 10/25/2023  9:32 AM CDT -----  Regarding: advise  Contact: pt  Type: Needs Medical Advice  Who Called:  Patient  Symptoms (please be specific):  allergic reaction  How long has patient had these symptoms:    Pharmacy name and phone #:      [facility] - [place] - [address]  [address]  [place] [address]  Phone: [phone] Fax: [phone]      Best Call Back Number: [phone]    Additional Information: I have Jennifer Davis MRN:[id number] needs to know if her antibiotic was sent are u available to advise?

## 2023-10-25 NOTE — TELEPHONE ENCOUNTER
Spoke with Juanita Paul. Informed pt was admitted to hospital. ER note and face sheet faxed per request.

## 2023-10-31 ENCOUNTER — DOCUMENT SCAN (OUTPATIENT)
Dept: HOME HEALTH SERVICES | Facility: HOSPITAL | Age: 68
End: 2023-10-31
Payer: MEDICARE

## 2023-11-03 ENCOUNTER — OFFICE VISIT (OUTPATIENT)
Dept: FAMILY MEDICINE | Facility: CLINIC | Age: 68
End: 2023-11-03
Payer: MEDICARE

## 2023-11-03 DIAGNOSIS — N18.32 STAGE 3B CHRONIC KIDNEY DISEASE: ICD-10-CM

## 2023-11-03 DIAGNOSIS — N39.0 UTI DUE TO EXTENDED-SPECTRUM BETA LACTAMASE (ESBL) PRODUCING ESCHERICHIA COLI: Primary | ICD-10-CM

## 2023-11-03 DIAGNOSIS — Z16.12 UTI DUE TO EXTENDED-SPECTRUM BETA LACTAMASE (ESBL) PRODUCING ESCHERICHIA COLI: Primary | ICD-10-CM

## 2023-11-03 DIAGNOSIS — B96.29 UTI DUE TO EXTENDED-SPECTRUM BETA LACTAMASE (ESBL) PRODUCING ESCHERICHIA COLI: Primary | ICD-10-CM

## 2023-11-03 PROCEDURE — 99213 OFFICE O/P EST LOW 20 MIN: CPT | Mod: 95,,, | Performed by: FAMILY MEDICINE

## 2023-11-03 PROCEDURE — 99213 PR OFFICE/OUTPT VISIT, EST, LEVL III, 20-29 MIN: ICD-10-PCS | Mod: 95,,, | Performed by: FAMILY MEDICINE

## 2023-11-03 NOTE — PROGRESS NOTES
The patient location is: [place]  The chief complaint leading to consultation is: hospital follow up.     Visit type: audiovisual    Face to Face time with patient: 10 minutes  10 minutes of total time spent on the encounter, which includes face to face time and non-face to face time preparing to see the patient (eg, review of tests), Obtaining and/or reviewing separately obtained history, Documenting clinical information in the electronic or other health record, Independently interpreting results (not separately reported) and communicating results to the patient/family/caregiver, or Care coordination (not separately reported).         Each patient to whom he or she provides medical services by telemedicine is:  (1) informed of the relationship between the physician and patient and the respective role of any other health care provider with respect to management of the patient; and (2) notified that he or she may decline to receive medical services by telemedicine and may withdraw from such care at any time.    Notes:  Subjective:       Patient ID: Jennifer Hurst is a 68 y.o. female.    Chief Complaint: No chief complaint on file.      Presents today for virtual.   She was admitted to the hospital with ESBL E Coli UTI and Gi bleed.   She tells me that home health therapy is giving up on her because she is not showing progress.   She is using ensure supplements because she does not like eating/drinking.     She tells me that she is getting to and from the bathroom without help. She has a bar by the toilet to pull herself up. She used to be sturdy enough to pull her pants up and pivot but that has not come back yet. Getting in and out of the bed has also been tough.     She has an upcoming colonoscopy and pill endoscopy with GI with [facility]. She is also hoping to schedule for her next cataract surgery.       Review of Systems   Constitutional:  Positive for activity change and unexpected weight change.   HENT:   Negative for hearing loss, rhinorrhea and trouble swallowing.    Eyes:  Negative for discharge and visual disturbance.   Respiratory:  Negative for chest tightness and wheezing.    Cardiovascular:  Negative for chest pain and palpitations.   Gastrointestinal:  Positive for vomiting. Negative for blood in stool, constipation and diarrhea.   Endocrine: Negative for polydipsia and polyuria.   Genitourinary:  Positive for difficulty urinating and hematuria. Negative for dysuria and menstrual problem.   Musculoskeletal:  Negative for arthralgias, joint swelling and neck pain.   Neurological:  Positive for weakness. Negative for headaches.   Psychiatric/Behavioral:  Negative for confusion and dysphoric mood.          Objective:      Physical Exam  Vitals and nursing note reviewed.   Constitutional:       General: She is not in acute distress.     Appearance: She is not ill-appearing.   Cardiovascular:      Rate and Rhythm: Normal rate and regular rhythm.      Heart sounds: No murmur heard.  Pulmonary:      Effort: Pulmonary effort is normal.      Breath sounds: Normal breath sounds. No wheezing.   Skin:     General: Skin is warm and dry.      Findings: No rash.   Neurological:      Mental Status: She is alert.   Psychiatric:         Mood and Affect: Mood normal.         Behavior: Behavior normal.         Assessment:       1. UTI due to extended-spectrum beta lactamase (ESBL) producing Escherichia coli    2. Stage 3b chronic kidney disease        Plan:       Problem List Items Addressed This Visit          Renal/    CKD (chronic kidney disease) stage 3, GFR 30-59 ml/min     Other Visit Diagnoses       UTI due to extended-spectrum beta lactamase (ESBL) producing Escherichia coli    -  Primary           Improving.   I have not seen her repeat Cr but she tells me it was normal on follow up

## 2023-11-07 ENCOUNTER — APPOINTMENT (OUTPATIENT)
Dept: LAB | Facility: HOSPITAL | Age: 68
End: 2023-11-07
Attending: FAMILY MEDICINE
Payer: MEDICARE

## 2023-11-07 DIAGNOSIS — N39.0 URINARY TRACT INFECTION, SITE NOT SPECIFIED: Primary | ICD-10-CM

## 2023-11-07 LAB
BACTERIA #/AREA URNS HPF: ABNORMAL /HPF
BILIRUB UR QL STRIP: NEGATIVE
CLARITY UR: CLEAR
COLOR UR: YELLOW
GLUCOSE UR QL STRIP: NEGATIVE
HGB UR QL STRIP: ABNORMAL
KETONES UR QL STRIP: NEGATIVE
LEUKOCYTE ESTERASE UR QL STRIP: ABNORMAL
MICROSCOPIC COMMENT: ABNORMAL
NITRITE UR QL STRIP: NEGATIVE
PH UR STRIP: 6 [PH] (ref 5–8)
PROT UR QL STRIP: ABNORMAL
RBC #/AREA URNS HPF: 3 /HPF (ref 0–4)
SP GR UR STRIP: 1.01 (ref 1–1.03)
SQUAMOUS #/AREA URNS HPF: ABNORMAL /HPF
URN SPEC COLLECT METH UR: ABNORMAL
UROBILINOGEN UR STRIP-ACNC: 1 EU/DL
WBC #/AREA URNS HPF: 15 /HPF (ref 0–5)

## 2023-11-07 PROCEDURE — 81000 URINALYSIS NONAUTO W/SCOPE: CPT | Performed by: FAMILY MEDICINE

## 2023-11-07 PROCEDURE — 87086 URINE CULTURE/COLONY COUNT: CPT | Performed by: FAMILY MEDICINE

## 2023-11-08 ENCOUNTER — TELEPHONE (OUTPATIENT)
Dept: FAMILY MEDICINE | Facility: CLINIC | Age: 68
End: 2023-11-08
Payer: MEDICARE

## 2023-11-08 ENCOUNTER — PATIENT MESSAGE (OUTPATIENT)
Dept: FAMILY MEDICINE | Facility: CLINIC | Age: 68
End: 2023-11-08
Payer: MEDICARE

## 2023-11-08 DIAGNOSIS — R60.0 LOWER EXTREMITY EDEMA: ICD-10-CM

## 2023-11-08 LAB — BACTERIA UR CULT: NORMAL

## 2023-11-08 RX ORDER — FUROSEMIDE 20 MG/1
TABLET ORAL
Qty: 60 TABLET | Refills: 0 | Status: SHIPPED | OUTPATIENT
Start: 2023-11-08

## 2023-11-08 NOTE — TELEPHONE ENCOUNTER
----- Message from Frank Mclaughlin sent at 11/8/2023  1:41 PM CST -----  Contact: self  Type:  Patient Returning Call    Who Called:  Patient  Who Left Message for Patient:  Raven  Does the patient know what this is regarding?:  yes  Best Call Back Number:  [phone]    Additional Information:  Pt is ret a call back.Please call back

## 2023-11-08 NOTE — TELEPHONE ENCOUNTER
Attempted to contact Jennifer Hurst to discuss Scheduling an appointment.    Left voice mail to return our call at [phone] on [phone] (home).    Raven Gutierrez LPN

## 2023-11-08 NOTE — TELEPHONE ENCOUNTER
----- Message from Myla Carrillo sent at 11/8/2023  1:34 PM CST -----  Contact: patient  Type: Needs Medical Advice  Who Called:  patient  Best Call Back Number: [phone] (home)   Additional Information: patient needs release to ha procedure

## 2023-11-08 NOTE — TELEPHONE ENCOUNTER
Refill Routing Note   Medication(s) are not appropriate for processing by [facility] for the following reason(s):      Medication outside of protocol: PRN use outside of [facility] protocol  Required labs abnormal    [facility] action(s):  Route Care Due:  Labs due: TSH outdated          Appointments  past 12m or future 3m with PCP    Date Provider   Last Visit   11/3/2023 Joleen Kathleen MD   Next Visit   Visit date not found Joleen Kathleen MD   ED visits in past 90 days: 1        Note composed:4:49 PM 11/08/2023

## 2023-11-08 NOTE — TELEPHONE ENCOUNTER
Care Due:                  Date            Visit Type   Department     Provider  --------------------------------------------------------------------------------                                ESTABLISHED                              PATIENT -    [facility] FAM[facility]  Last Visit: 11-      [facility] [facility]      MEDICINE       Joleen Kathleen  Next Visit: None Scheduled  None         None Found                                                            Last  Test          Frequency    Reason                     Performed    Due Date  --------------------------------------------------------------------------------    TSH.........  12 months..  levothyroxine............  Not Found    Overdue    [employer] Embedded Care Due Messages. Reference number: [id number].   11/08/2023 4:46:30 PM CST

## 2023-11-13 ENCOUNTER — DOCUMENT SCAN (OUTPATIENT)
Dept: HOME HEALTH SERVICES | Facility: HOSPITAL | Age: 68
End: 2023-11-13
Payer: MEDICARE

## 2023-11-14 ENCOUNTER — DOCUMENT SCAN (OUTPATIENT)
Dept: HOME HEALTH SERVICES | Facility: HOSPITAL | Age: 68
End: 2023-11-14
Payer: MEDICARE

## 2023-11-15 ENCOUNTER — PATIENT MESSAGE (OUTPATIENT)
Dept: FAMILY MEDICINE | Facility: CLINIC | Age: 68
End: 2023-11-15
Payer: MEDICARE

## 2023-11-15 ENCOUNTER — DOCUMENT SCAN (OUTPATIENT)
Dept: HOME HEALTH SERVICES | Facility: HOSPITAL | Age: 68
End: 2023-11-15
Payer: MEDICARE

## 2023-11-15 ENCOUNTER — TELEPHONE (OUTPATIENT)
Dept: FAMILY MEDICINE | Facility: CLINIC | Age: 68
End: 2023-11-15
Payer: MEDICARE

## 2023-11-15 NOTE — TELEPHONE ENCOUNTER
----- Message from Raul Ortiz sent at 11/15/2023  1:00 PM CST -----  Regarding: Return Call  Contact: patient  Type:  Patient Returning Call    Who Called:patient  Who Left Message for Patient:Lisa  Does the patient know what this is regarding?:unable to sent picture   Would the patient rather a call back or a response via [facility]? Please call back  Best Call Back Number:[phone]  Additional Information:

## 2023-11-15 NOTE — TELEPHONE ENCOUNTER
"Pt states " I have tried to upload the picture but it will not send" informed pt I would send message to her so she can update. Pt voiced understanding.   "

## 2023-11-15 NOTE — TELEPHONE ENCOUNTER
----- Message from Michaela Mak sent at 11/15/2023 10:02 AM CST -----  Contact: pt [phone]  Type: Needs Medical Advice  Who Called:  Pt   Symptoms (please be specific):  Ringworm like spots on chest/ back/ arm/ and itching   How long has patient had these symptoms:  pt stated for a while   Pharmacy name and phone #:    [facility] - [place] - [address]  [address]  [place] [address]  Phone: [phone] Fax: [phone]        Best Call Back Number: [phone]    Additional Information: Pt requesting a call back

## 2023-11-25 PROCEDURE — G0179 PR HOME HEALTH MD RECERTIFICATION: ICD-10-PCS | Mod: ,,, | Performed by: FAMILY MEDICINE

## 2023-11-25 PROCEDURE — G0179 MD RECERTIFICATION HHA PT: HCPCS | Mod: ,,, | Performed by: FAMILY MEDICINE

## 2023-11-30 ENCOUNTER — DOCUMENT SCAN (OUTPATIENT)
Dept: HOME HEALTH SERVICES | Facility: HOSPITAL | Age: 68
End: 2023-11-30
Payer: MEDICARE

## 2023-12-04 RX ORDER — PANTOPRAZOLE SODIUM 40 MG/1
40 TABLET, DELAYED RELEASE ORAL 2 TIMES DAILY
Qty: 180 TABLET | Refills: 0 | Status: SHIPPED | OUTPATIENT
Start: 2023-12-04

## 2023-12-04 NOTE — TELEPHONE ENCOUNTER
No care due was identified.  [facility] Embedded Care Due Messages. Reference number: [id number].   12/04/2023 11:47:47 AM CST

## 2023-12-04 NOTE — TELEPHONE ENCOUNTER
Refill Routing Note   Medication(s) are not appropriate for processing by [facility] for the following reason(s):        Outside of protocol: total daily dose outside of [facility] protocol    [facility] action(s):  Route               Appointments  past 12m or future 3m with PCP    Date Provider   Last Visit   11/3/2023 Joleen Kathleen MD   Next Visit   12/18/2023 Joleen Kathleen MD   ED visits in past 90 days: 1        Note composed:11:48 AM 12/04/2023

## 2023-12-04 NOTE — TELEPHONE ENCOUNTER
----- Message from Deb Byers sent at 12/4/2023  9:22 AM CST -----  Contact: Self  Type: Needs Medical Advice  Who Called:  Pt     Best Call Back Number: [phone]    Additional Information: please call patient regarding protonix and left eye.

## 2023-12-05 ENCOUNTER — EXTERNAL HOME HEALTH (OUTPATIENT)
Dept: HOME HEALTH SERVICES | Facility: HOSPITAL | Age: 68
End: 2023-12-05
Payer: MEDICARE

## 2023-12-18 ENCOUNTER — OFFICE VISIT (OUTPATIENT)
Dept: FAMILY MEDICINE | Facility: CLINIC | Age: 68
End: 2023-12-18
Payer: MEDICARE

## 2023-12-18 ENCOUNTER — TELEPHONE (OUTPATIENT)
Dept: FAMILY MEDICINE | Facility: CLINIC | Age: 68
End: 2023-12-18
Payer: MEDICARE

## 2023-12-18 DIAGNOSIS — D53.9 MACROCYTIC ANEMIA: ICD-10-CM

## 2023-12-18 DIAGNOSIS — R26.0 ATAXIC GAIT: ICD-10-CM

## 2023-12-18 DIAGNOSIS — N18.32 STAGE 3B CHRONIC KIDNEY DISEASE: Primary | ICD-10-CM

## 2023-12-18 DIAGNOSIS — R53.81 PHYSICAL DEBILITY: ICD-10-CM

## 2023-12-18 DIAGNOSIS — R26.81 GAIT INSTABILITY: ICD-10-CM

## 2023-12-18 PROCEDURE — 99213 OFFICE O/P EST LOW 20 MIN: CPT | Mod: 95,,, | Performed by: FAMILY MEDICINE

## 2023-12-18 PROCEDURE — 99213 PR OFFICE/OUTPT VISIT, EST, LEVL III, 20-29 MIN: ICD-10-PCS | Mod: 95,,, | Performed by: FAMILY MEDICINE

## 2023-12-18 NOTE — TELEPHONE ENCOUNTER
Adali Lift order faxed to [facility] fax # [phone]   Discussed with .  Patient has a place for skilled facility at [facility] tomorrow morning.  Will proceed with the discharge orders and discharge summary for tomorrow.

## 2023-12-18 NOTE — PROGRESS NOTES
The patient location is: [place]  The chief complaint leading to consultation is: Needs lift. Clearance for cataract    Visit type: audiovisual    Face to Face time with patient: 10m 18s    13 minutes of total time spent on the encounter, which includes face to face time and non-face to face time preparing to see the patient (eg, review of tests), Obtaining and/or reviewing separately obtained history, Documenting clinical information in the electronic or other health record, Independently interpreting results (not separately reported) and communicating results to the patient/family/caregiver, or Care coordination (not separately reported).         Each patient to whom he or she provides medical services by telemedicine is:  (1) informed of the relationship between the physician and patient and the respective role of any other health care provider with respect to management of the patient; and (2) notified that he or she may decline to receive medical services by telemedicine and may withdraw from such care at any time.    Notes:  Subjective:       Patient ID: Jennifer Hurst is a 68 y.o. female.    Chief Complaint: No chief complaint on file.      Ms. Susan Hurst presents today for virtual visit to get clearance for cataract surgery.   She has had 6 falls in the last 6 weeks and she thinks this is contributory.     She is also concerned that she needs a gracy lift. She has been falling and she has not been able to get off the floor. She also can no longer get into her bed without a lift. She has her  at home who could assist her with getting in the bed.       Review of Systems   Constitutional:  Negative for activity change.   HENT:  Negative for hearing loss, rhinorrhea and trouble swallowing.    Eyes:  Positive for visual disturbance. Negative for discharge.   Respiratory:  Negative for chest tightness and wheezing.    Cardiovascular:  Negative for chest pain and palpitations.   Gastrointestinal:  Negative  for blood in stool, constipation, diarrhea and vomiting.   Endocrine: Negative for polydipsia and polyuria.   Genitourinary:  Negative for difficulty urinating, dysuria, hematuria and menstrual problem.   Musculoskeletal:  Negative for arthralgias, joint swelling and neck pain.   Neurological:  Negative for weakness and headaches.   Psychiatric/Behavioral:  Negative for confusion and dysphoric mood.          Objective:      Physical Exam  Constitutional:       General: She is not in acute distress.     Appearance: Normal appearance. She is not ill-appearing.   Pulmonary:      Effort: Pulmonary effort is normal. No respiratory distress.   Skin:     Comments: Wound shin   Neurological:      Mental Status: She is alert.   Psychiatric:         Mood and Affect: Mood normal.         Behavior: Behavior normal.         Thought Content: Thought content normal.         Judgment: Judgment normal.         Assessment:       1. Stage 3b chronic kidney disease    2. Macrocytic anemia    3. Ataxic gait    4. Physical debility    5. Gait instability        Plan:       Problem List Items Addressed This Visit          Neuro    Ataxic gait    Relevant Orders    PATIENT (NEDA) LIFT FOR HOME USE       Renal/    CKD (chronic kidney disease) stage 3, GFR 30-59 ml/min - Primary    Relevant Orders    Basic Metabolic Panel       Oncology    Macrocytic anemia    Relevant Orders    CBC Auto Differential     Other Visit Diagnoses       Physical debility        Relevant Orders    PATIENT (NEDA) LIFT FOR HOME USE    Gait instability        Relevant Orders    PATIENT (NEDA) LIFT FOR HOME USE             Dr. Vega clearance pending lab results.

## 2023-12-18 NOTE — TELEPHONE ENCOUNTER
----- Message from Elaine Telles sent at 12/18/2023  2:32 PM CST -----  Contact: PT  Type: Needs Medical Advice    Who Called: PT  Best Call Back Number: [phone]  Additional  Information: Would like order for Adali lift sent to [employer], PH# [phone]  Please Advise- Thank you

## 2023-12-21 ENCOUNTER — TELEPHONE (OUTPATIENT)
Dept: FAMILY MEDICINE | Facility: CLINIC | Age: 68
End: 2023-12-21
Payer: MEDICARE

## 2023-12-21 ENCOUNTER — PATIENT MESSAGE (OUTPATIENT)
Dept: FAMILY MEDICINE | Facility: CLINIC | Age: 68
End: 2023-12-21
Payer: MEDICARE

## 2023-12-21 NOTE — TELEPHONE ENCOUNTER
----- Message from Debbie hCan sent at 12/21/2023 10:34 AM CST -----  Contact: [facility]  Type: Needs Medical Advice  Who Called:  [facility]  Best Call Back Number: [phone]  Additional Information: pt had a hgb of 6.7 please call

## 2023-12-21 NOTE — TELEPHONE ENCOUNTER
Attempted to contact pt. No answer. Unable to leave message.   Home health nurse not in office.   Message sent to pt via [employer]

## 2023-12-21 NOTE — TELEPHONE ENCOUNTER
Dear ,     In the absence of Joleen Kathleen MD, can you please address this patients concern or request?    Thank you,  AHSAN Leger with [facility] reports level. hgb 6.7   Please advise

## 2023-12-22 ENCOUNTER — TELEPHONE (OUTPATIENT)
Dept: FAMILY MEDICINE | Facility: CLINIC | Age: 68
End: 2023-12-22
Payer: MEDICARE

## 2023-12-22 NOTE — TELEPHONE ENCOUNTER
"Spoke with pt. Patient states, " I am not going to the ER. Nope not with the Holiday. I am fine. I am not going" pt refused ER recommendation.   "
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
## 2023-12-27 ENCOUNTER — TELEPHONE (OUTPATIENT)
Dept: FAMILY MEDICINE | Facility: CLINIC | Age: 68
End: 2023-12-27
Payer: MEDICARE

## 2024-01-02 ENCOUNTER — TELEPHONE (OUTPATIENT)
Dept: FAMILY MEDICINE | Facility: CLINIC | Age: 69
End: 2024-01-02
Payer: MEDICARE

## 2024-01-02 NOTE — TELEPHONE ENCOUNTER
----- Message from Sofia Telles sent at 1/2/2024  3:30 PM CST -----  Regarding: orders  Contact: sharan  Type:  Needs Medical Advice    Who Called: sharan  Would the patient rather a call back or a response via [facility]? Call back  Best Call Back Number: [phone]  Additional Information: sts she would like to speak to the nurse about orders that was sent over

## 2024-01-02 NOTE — TELEPHONE ENCOUNTER
Spoke with Hafsa. Hafsa with discharge next week and draw labs tomorrow. Verbalized understanding

## 2024-01-02 NOTE — TELEPHONE ENCOUNTER
----- Message from Mercedes Archer, Patient Care Assistant sent at 12/27/2023  1:29 PM CST -----  Regarding: advice  Contact: pt  Type: Needs Medical Advice    Who Called:  pt     Best Call Back Number: [phone] (home)     Additional Information: pt states she would like a callback from nurse Gonzalez regarding unable to send information on mychart. Please call to advise. Thanks!       
----- Message from Mercedes Archer, Patient Care Assistant sent at 12/27/2023  1:29 PM CST -----  Regarding: advice  Contact: pt  Type: Needs Medical Advice    Who Called:  pt     Best Call Back Number: [phone] (home)     Additional Information: pt states she would like a callback from nurse Gonzalez regarding unable to send information on mychart. Please call to advise. Thanks!       
----- Message from Nayeli Copeland sent at 12/27/2023  4:40 PM CST -----  Regarding: advise  Contact: patient  Type: Needs Medical Advice  Who Called:  patient   Symptoms (please be specific):  discuss labs   How long has patient had these symptoms:    Pharmacy name and phone #:    Best Call Back Number: [phone]]  Additional Information: didn't have a ride to the hosp hemoglobin levels were very low; needs to advise her current state and another order of labs for home health next week call to advise thanks!        
Home health states if we can send the lab orders they will collect  
Lilliam,     Were these orders sent?    Thank you  
Pt calling waiting for response from provider regarding lab orders for [facility]  
Pt stated she is feeling better however would like home health to redo her blood draw next week by [facility] at [facility]. (when she returns).     Eye surgery appointment date changed as her  is very ill.     Pt stated HH is going to discharge her is blood draw is okay.   
Where are the lab results from labs ordered last week?  
surgery

## 2024-01-04 ENCOUNTER — LAB VISIT (OUTPATIENT)
Dept: LAB | Facility: HOSPITAL | Age: 69
End: 2024-01-04
Attending: FAMILY MEDICINE
Payer: MEDICARE

## 2024-01-04 DIAGNOSIS — N18.9 ANEMIA OF CHRONIC RENAL FAILURE: ICD-10-CM

## 2024-01-04 DIAGNOSIS — D63.1 ANEMIA OF CHRONIC RENAL FAILURE: ICD-10-CM

## 2024-01-04 DIAGNOSIS — N18.32 CHRONIC KIDNEY DISEASE (CKD) STAGE G3B/A1, MODERATELY DECREASED GLOMERULAR FILTRATION RATE (GFR) BETWEEN 30-44 ML/MIN/1.73 SQUARE METER AND ALBUMINURIA CREATININE RATIO LESS THAN 30 MG/G: Primary | ICD-10-CM

## 2024-01-04 LAB
ANION GAP SERPL CALC-SCNC: 15 MMOL/L (ref 8–16)
BASOPHILS # BLD AUTO: 0.05 K/UL (ref 0–0.2)
BASOPHILS NFR BLD: 0.9 % (ref 0–1.9)
BUN SERPL-MCNC: 17 MG/DL (ref 8–23)
CALCIUM SERPL-MCNC: 8.5 MG/DL (ref 8.7–10.5)
CHLORIDE SERPL-SCNC: 107 MMOL/L (ref 95–110)
CO2 SERPL-SCNC: 18 MMOL/L (ref 23–29)
CREAT SERPL-MCNC: 1.3 MG/DL (ref 0.5–1.4)
DIFFERENTIAL METHOD BLD: ABNORMAL
EOSINOPHIL # BLD AUTO: 0.2 K/UL (ref 0–0.5)
EOSINOPHIL NFR BLD: 3.7 % (ref 0–8)
ERYTHROCYTE [DISTWIDTH] IN BLOOD BY AUTOMATED COUNT: 15.1 % (ref 11.5–14.5)
EST. GFR  (NO RACE VARIABLE): 44.2 ML/MIN/1.73 M^2
GLUCOSE SERPL-MCNC: 91 MG/DL (ref 70–110)
HCT VFR BLD AUTO: 20.1 % (ref 37–48.5)
HGB BLD-MCNC: 6.6 G/DL (ref 12–16)
IMM GRANULOCYTES # BLD AUTO: 0.03 K/UL (ref 0–0.04)
IMM GRANULOCYTES NFR BLD AUTO: 0.6 % (ref 0–0.5)
LYMPHOCYTES # BLD AUTO: 0.8 K/UL (ref 1–4.8)
LYMPHOCYTES NFR BLD: 14.7 % (ref 18–48)
MCH RBC QN AUTO: 39.3 PG (ref 27–31)
MCHC RBC AUTO-ENTMCNC: 32.8 G/DL (ref 32–36)
MCV RBC AUTO: 120 FL (ref 82–98)
MONOCYTES # BLD AUTO: 0.7 K/UL (ref 0.3–1)
MONOCYTES NFR BLD: 13.1 % (ref 4–15)
NEUTROPHILS # BLD AUTO: 3.6 K/UL (ref 1.8–7.7)
NEUTROPHILS NFR BLD: 67 % (ref 38–73)
NRBC BLD-RTO: 0 /100 WBC
PLATELET # BLD AUTO: 141 K/UL (ref 150–450)
PMV BLD AUTO: 8.9 FL (ref 9.2–12.9)
POTASSIUM SERPL-SCNC: 3.6 MMOL/L (ref 3.5–5.1)
RBC # BLD AUTO: 1.68 M/UL (ref 4–5.4)
SODIUM SERPL-SCNC: 140 MMOL/L (ref 136–145)
WBC # BLD AUTO: 5.43 K/UL (ref 3.9–12.7)

## 2024-01-04 PROCEDURE — 36415 COLL VENOUS BLD VENIPUNCTURE: CPT | Performed by: FAMILY MEDICINE

## 2024-01-04 PROCEDURE — 80048 BASIC METABOLIC PNL TOTAL CA: CPT | Performed by: FAMILY MEDICINE

## 2024-01-04 PROCEDURE — 85025 COMPLETE CBC W/AUTO DIFF WBC: CPT | Performed by: FAMILY MEDICINE

## 2024-01-05 ENCOUNTER — TELEPHONE (OUTPATIENT)
Dept: FAMILY MEDICINE | Facility: CLINIC | Age: 69
End: 2024-01-05
Payer: MEDICARE

## 2024-01-05 ENCOUNTER — NURSE TRIAGE (OUTPATIENT)
Dept: ADMINISTRATIVE | Facility: CLINIC | Age: 69
End: 2024-01-05
Payer: MEDICARE

## 2024-01-05 NOTE — TELEPHONE ENCOUNTER
Reason for Disposition   Nursing judgment, per information in Reference     See notes    Protocols used: No Guideline [id number]    Pt is stating she needs blood. States her Home Health labs came back very low. Pt is requesting to speak with her PCP's nurse. Pt states she lives very far away and cannot get to the hospital. Pt advised to call 911. She adamantly refused due to cost. Pt states her PCP's nurse Lenora arranges for her to get blood transfusions. Message sent to the PCP and office staff to call pt.

## 2024-01-05 NOTE — TELEPHONE ENCOUNTER
----- Message from Karen Ling sent at 1/5/2024 12:42 PM CST -----  Contact: PT  Type:  Needs Medical Advice    Who Called: PT  Symptoms (please be specific): PT NEEDS TO GO TO THE ER IN [place] TOMORROW FOR A BLOOD TRANSFUSION  AND WOULD LIKE YOU TO CALL TO INSTRUCT THEM NOT TO SEND HER TO ANOTHER HOSPITAL.   Would the patient rather a call back or a response via [facility]? CALL   Best Call Back Number: [phone] (home)   Additional Information: THANK YOU

## 2024-01-05 NOTE — TELEPHONE ENCOUNTER
Agree that she needs blood but I cannot control what the ED doctor decides to do. With the recent closure of the infusion center I believe transfusions now have to go through the ED. I am actively working on trying to figure out the new process. -Dr. Kathleen

## 2024-01-08 ENCOUNTER — TELEPHONE (OUTPATIENT)
Dept: FAMILY MEDICINE | Facility: CLINIC | Age: 69
End: 2024-01-08
Payer: MEDICARE

## 2024-01-08 ENCOUNTER — OFFICE VISIT (OUTPATIENT)
Dept: FAMILY MEDICINE | Facility: CLINIC | Age: 69
End: 2024-01-08
Payer: MEDICARE

## 2024-01-08 DIAGNOSIS — I48.11 LONGSTANDING PERSISTENT ATRIAL FIBRILLATION: ICD-10-CM

## 2024-01-08 DIAGNOSIS — N18.32 STAGE 3B CHRONIC KIDNEY DISEASE: ICD-10-CM

## 2024-01-08 DIAGNOSIS — D64.9 ANEMIA, UNSPECIFIED TYPE: Primary | ICD-10-CM

## 2024-01-08 DIAGNOSIS — D69.6 PLATELETS DECREASED: ICD-10-CM

## 2024-01-08 PROCEDURE — 99214 OFFICE O/P EST MOD 30 MIN: CPT | Mod: 95,,, | Performed by: FAMILY MEDICINE

## 2024-01-08 NOTE — PROGRESS NOTES
The patient location is: [place]  The chief complaint leading to consultation is: Low blood    Visit type: audiovisual    Face to Face time with patient: 10m 20s    14 minutes of total time spent on the encounter, which includes face to face time and non-face to face time preparing to see the patient (eg, review of tests), Obtaining and/or reviewing separately obtained history, Documenting clinical information in the electronic or other health record, Independently interpreting results (not separately reported) and communicating results to the patient/family/caregiver, or Care coordination (not separately reported).         Each patient to whom he or she provides medical services by telemedicine is:  (1) informed of the relationship between the physician and patient and the respective role of any other health care provider with respect to management of the patient; and (2) notified that he or she may decline to receive medical services by telemedicine and may withdraw from such care at any time.    Notes:  Subjective:       Patient ID: Jennifer Hurst is a 68 y.o. female.    Chief Complaint: No chief complaint on file.      Presents today via virtual platform to follow up low blood. She has a chronic blood loss anemia from blood loss from her GI tract. She is following with GI.   Most recent H/H  Lab Results       Component                Value               Date                       WBC                      5.43                01/04/2024                 HGB                      6.6 (L)             01/04/2024                 HCT                      20.1 (L)            01/04/2024                 MCV                      120 (H)             01/04/2024                 PLT                      141 (L)             01/04/2024            She feels chronically low and fatigued.   She needs a blood transfusion, but does not want to be transferred out to another hospital as she already has GI care. She currently denies amaury  GI losses and reports actually being somewhat constipated.       Review of Systems   Constitutional:  Negative for activity change and unexpected weight change.   HENT:  Negative for hearing loss, rhinorrhea and trouble swallowing.    Eyes:  Negative for discharge and visual disturbance.   Respiratory:  Negative for chest tightness and wheezing.    Cardiovascular:  Negative for chest pain and palpitations.   Gastrointestinal:  Positive for blood in stool. Negative for constipation, diarrhea and vomiting.   Endocrine: Negative for polydipsia and polyuria.   Genitourinary:  Negative for difficulty urinating, dysuria, hematuria and menstrual problem.   Musculoskeletal:  Negative for arthralgias, joint swelling and neck pain.   Neurological:  Negative for weakness and headaches.   Psychiatric/Behavioral:  Negative for confusion and dysphoric mood.          Objective:      Physical Exam  Constitutional:       General: She is not in acute distress.     Appearance: Normal appearance.      Comments: pale   Pulmonary:      Effort: Pulmonary effort is normal. No respiratory distress.   Neurological:      Mental Status: She is alert.   Psychiatric:         Mood and Affect: Mood normal.         Behavior: Behavior normal.         Thought Content: Thought content normal.         Judgment: Judgment normal.         Assessment:       1. Anemia, unspecified type    2. Longstanding persistent atrial fibrillation    3. Platelets decreased    4. Stage 3b chronic kidney disease        Plan:       Problem List Items Addressed This Visit          Cardiac/Vascular    A-fib     Chronic. Stable. Denies issues.             Renal/    Stage 3b chronic kidney disease     Renal function improved on recent recheck.             Hematology    Platelets decreased     Lab Results   Component Value Date    WBC 5.43 01/04/2024    HGB 6.6 (L) 01/04/2024    HCT 20.1 (L) 01/04/2024     (H) 01/04/2024     (L) 01/04/2024                 Other  Visit Diagnoses       Anemia, unspecified type    -  Primary    Needs transfusion. Will work with infusion center to figure out how to coordinate.

## 2024-01-08 NOTE — ASSESSMENT & PLAN NOTE
Lab Results   Component Value Date    WBC 5.43 01/04/2024    HGB 6.6 (L) 01/04/2024    HCT 20.1 (L) 01/04/2024     (H) 01/04/2024     (L) 01/04/2024

## 2024-01-08 NOTE — TELEPHONE ENCOUNTER
----- Message from Rvaen Gutierrez LPN sent at 1/8/2024 10:08 AM CST -----  Contact: patient    ----- Message -----  From: Kerry Faye  Sent: 1/8/2024  10:07 AM CST  To: Frannie CEBALLOS Staff    Type:  Needs Medical Advice    Who Called: patient     Would the patient rather a call back or a response via [facility]? Call     Best Call Back Number: [phone] (home)      Additional Information: Patient would like to speak with the nurse in regards to appointment.     Please call to advise

## 2024-01-08 NOTE — TELEPHONE ENCOUNTER
Spoke with patient. She states she was unable to have transfusion done at [facility] they wanted to send her to a hospital hours away, and that hospital would need to watch her for a few days prior to discharge R/T diagnosis. Pt has a hard time with transportation. Pt has VV with you today to discuss further.

## 2024-01-17 ENCOUNTER — DOCUMENT SCAN (OUTPATIENT)
Dept: HOME HEALTH SERVICES | Facility: HOSPITAL | Age: 69
End: 2024-01-17
Payer: MEDICARE

## 2024-01-22 ENCOUNTER — TELEPHONE (OUTPATIENT)
Dept: FAMILY MEDICINE | Facility: CLINIC | Age: 69
End: 2024-01-22
Payer: MEDICARE

## 2024-01-22 NOTE — TELEPHONE ENCOUNTER
----- Message from Debbie Chan sent at 1/22/2024 10:10 AM CST -----  Contact: self  Type: Needs Medical Advice  Who Called:  pt  Symptoms (please be specific):  low blood count  How long has patient had these symptoms:  for a while  Pharmacy name and phone #:  n/a  Best Call Back Number: [phone]   Additional Information: pt is going to the hospital and would like to speak with the office prior please call

## 2024-01-22 NOTE — TELEPHONE ENCOUNTER
If currently going to the hospital because symptomatic- I recommend she go.   She really needs a repeat blood level at this point. We now have solidified the process for getting an outpatient blood transfusion as of Friday but it could take a day or two-Dr. Kathleen
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
## 2024-01-24 ENCOUNTER — TELEPHONE (OUTPATIENT)
Dept: FAMILY MEDICINE | Facility: CLINIC | Age: 69
End: 2024-01-24
Payer: MEDICARE

## 2024-01-24 DIAGNOSIS — D69.6 PLATELETS DECREASED: Primary | ICD-10-CM

## 2024-01-24 DIAGNOSIS — D53.9 MACROCYTIC ANEMIA: ICD-10-CM

## 2024-01-24 DIAGNOSIS — R26.81 GAIT INSTABILITY: ICD-10-CM

## 2024-01-24 NOTE — TELEPHONE ENCOUNTER
----- Message from Edith Bettencourt sent at 1/24/2024  9:06 AM CST -----  Type:  Needs Medical Advice    Who Called:  Pt    Would the patient rather a call back or a response via [facility]?  Call back    Best Call Back Number:  [phone]    Additional Information:  Pt is asking to speak with someone about labs and outpatient picc insertion. Possibly needing blood.   Please call back to advise. Thanks!

## 2024-01-24 NOTE — TELEPHONE ENCOUNTER
"Spoke with pt. Pt requesting picc line, home health reinstated. Pt states " I cannot go to the ER. I have no one to represent me. I need her assist with this. We have talked about it before" /    Please advise.   "

## 2024-01-24 NOTE — TELEPHONE ENCOUNTER
[place] ordered. [place] [facility]   CBC ordered for them to draw on admission.   Once we have results- can coordinate outpatient transfusion as we need more up to date lab than her recent one.     If she becomes severely symptomatic- she would still need ED care

## 2024-01-25 ENCOUNTER — TELEPHONE (OUTPATIENT)
Dept: FAMILY MEDICINE | Facility: CLINIC | Age: 69
End: 2024-01-25
Payer: MEDICARE
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
## 2024-01-26 PROCEDURE — G0180 MD CERTIFICATION HHA PATIENT: HCPCS | Mod: ,,, | Performed by: FAMILY MEDICINE

## 2024-01-29 ENCOUNTER — TELEPHONE (OUTPATIENT)
Dept: FAMILY MEDICINE | Facility: CLINIC | Age: 69
End: 2024-01-29
Payer: MEDICARE

## 2024-01-29 NOTE — TELEPHONE ENCOUNTER
----- Message from Michele Gamboa sent at 1/29/2024  1:18 PM CST -----  Regarding: going to ED  Type:  Needs Medical Advice    Who Called: Pt        Would the patient rather a call back or a response via [facility]? Call back    Best Call Back Number: [phone]      Additional Information: Sts she wanted to let the office know she is trying to get to the ED at [facility] and wants to let her know to send whatever information they can get send over to them.   Please advise -- Thank you

## 2024-01-30 ENCOUNTER — TELEPHONE (OUTPATIENT)
Dept: FAMILY MEDICINE | Facility: CLINIC | Age: 69
End: 2024-01-30
Payer: MEDICARE

## 2024-01-30 NOTE — TELEPHONE ENCOUNTER
----- Message from Elaine Telles sent at 1/30/2024  9:32 AM CST -----  Contact: PT  Type: Needs Medical Advice    Who Called: PT  Best Call Back Number: [phone]  Additional  Information: PT being discharged from [facility], today requesting a call back said the nurses are trying to contact her. To update her on what's going on will also need a referral to Hematology.  Please Advise- Thank you

## 2024-01-30 NOTE — TELEPHONE ENCOUNTER
"Spoke with pt. Pt states, " I was seen in the ER, was given 2 things of blood and they gave me a referral for hematology. I just wanted to inform the office" encouraged pt to contact office if she has questions or concerns. Pt voiced understanding.   "

## 2024-01-30 NOTE — TELEPHONE ENCOUNTER
----- Message from Kerry Faye sent at 1/30/2024  1:38 PM CST -----  Contact: patient  Type:  Patient Returning Call    Who Called:patient     Who Left Message for Patient: Raven     Does the patient know what this is regarding?:yes     Would the patient rather a call back or a response via [facility]? Call     Best Call Back Number:[phone] (home)      Additional Information:

## 2024-01-31 ENCOUNTER — TELEPHONE (OUTPATIENT)
Dept: FAMILY MEDICINE | Facility: CLINIC | Age: 69
End: 2024-01-31
Payer: MEDICARE

## 2024-01-31 DIAGNOSIS — D53.9 MACROCYTIC ANEMIA: ICD-10-CM

## 2024-01-31 DIAGNOSIS — D69.6 PLATELETS DECREASED: Primary | ICD-10-CM

## 2024-01-31 NOTE — TELEPHONE ENCOUNTER
Pt requesting new hematology/oncology referral due to macrocytic anemia. Referral pended, unsure what provider to place

## 2024-01-31 NOTE — TELEPHONE ENCOUNTER
----- Message from Milagro Lee sent at 1/31/2024  7:05 AM CST -----  Contact: Self  Type: Needs Medical Advice    Who Called:  Patient  What is this regarding?:  She wants a referral to see a hematologist and oncologist. The hospital isn't gonna refer me.  Best Call Back Number:  [phone]  Additional Information:  Please call the patient back at the phone number listed above to advise. Thank you!

## 2024-02-01 ENCOUNTER — TELEPHONE (OUTPATIENT)
Dept: FAMILY MEDICINE | Facility: CLINIC | Age: 69
End: 2024-02-01
Payer: MEDICARE

## 2024-02-01 ENCOUNTER — DOCUMENT SCAN (OUTPATIENT)
Dept: HOME HEALTH SERVICES | Facility: HOSPITAL | Age: 69
End: 2024-02-01
Payer: MEDICARE

## 2024-02-01 NOTE — TELEPHONE ENCOUNTER
----- Message from Karen Sushil sent at 2/1/2024  1:37 PM CST -----  Contact: PT  Type:  Patient Requesting Referral    Who Called:PT  Does the patient already have the specialty appointment scheduled?:NO  If yes, what is the date of that appointment?:N/A  Referral to What Specialty:HEMATOLOGIST   Reason for Referral:MACRO LIPID ANEMIA   Does the patient want the referral with a specific physician?  [facility]   Is the specialist an [facility] or [facility]?: NON [facility]  [facility]   Patient Requesting a Response?:YES   Would the patient rather a call back or a response via [facility]? CALL   Best Call Back Number:[phone] (home)   Additional Information: THANK YOU

## 2024-02-02 ENCOUNTER — TELEPHONE (OUTPATIENT)
Dept: FAMILY MEDICINE | Facility: CLINIC | Age: 69
End: 2024-02-02
Payer: MEDICARE

## 2024-02-02 ENCOUNTER — DOCUMENT SCAN (OUTPATIENT)
Dept: HOME HEALTH SERVICES | Facility: HOSPITAL | Age: 69
End: 2024-02-02
Payer: MEDICARE

## 2024-02-02 NOTE — TELEPHONE ENCOUNTER
----- Message from Karen Ling sent at 2/1/2024  3:58 PM CST -----  Contact: PT  Type:  Needs Medical Advice    Who Called: PT   Symptoms (please be specific): PLEASE CALL PT TO DISCUSS HAVING  RESULTS  SENT TO THIS OFFICE  FROM DR. DARYN JIN OFFICE   Would the patient rather a call back or a response via [facility]? CALL   Best Call Back Number: [phone] (home)   Additional Information: THANK YOU

## 2024-02-05 ENCOUNTER — TELEPHONE (OUTPATIENT)
Dept: FAMILY MEDICINE | Facility: CLINIC | Age: 69
End: 2024-02-05
Payer: MEDICARE

## 2024-02-05 NOTE — TELEPHONE ENCOUNTER
----- Message from Michaela Mak sent at 2/5/2024  3:07 PM CST -----  Contact: Gilda [phone]  Type: Needs Medical Advice  Who Called:  Gilda thapa/ Dr Patel's office     Best Call Back Number: [phone]  fax [phone]  Additional Information: Gilda calling because the received lab results and referral for pt but they need clinic notes and/ or orders. Pls call back and advise        Consent 2/Introductory Paragraph: Mohs surgery was explained to the patient and consent was obtained. The risks, benefits and alternatives to therapy were discussed in detail. Specifically, the risks of infection, scarring, bleeding, prolonged wound healing, incomplete removal, allergy to anesthesia, nerve injury and recurrence were addressed. Prior to the procedure, the treatment site was clearly identified and confirmed by the patient. All components of Universal Protocol/PAUSE Rule completed.

## 2024-02-07 ENCOUNTER — TELEPHONE (OUTPATIENT)
Dept: HEMATOLOGY/ONCOLOGY | Facility: CLINIC | Age: 69
End: 2024-02-07
Payer: MEDICARE

## 2024-02-09 ENCOUNTER — DOCUMENT SCAN (OUTPATIENT)
Dept: HOME HEALTH SERVICES | Facility: HOSPITAL | Age: 69
End: 2024-02-09
Payer: MEDICARE

## 2024-02-20 ENCOUNTER — LAB VISIT (OUTPATIENT)
Dept: LAB | Facility: HOSPITAL | Age: 69
End: 2024-02-20
Attending: FAMILY MEDICINE
Payer: MEDICARE

## 2024-02-20 DIAGNOSIS — D53.9 MACROCYTIC ANEMIA: ICD-10-CM

## 2024-02-20 DIAGNOSIS — D64.9 ANEMIA, UNSPECIFIED TYPE: Primary | ICD-10-CM

## 2024-02-20 DIAGNOSIS — N18.9 CHRONIC KIDNEY DISEASE, UNSPECIFIED CKD STAGE: ICD-10-CM

## 2024-02-20 LAB
BASOPHILS # BLD AUTO: 0.05 K/UL (ref 0–0.2)
BASOPHILS NFR BLD: 1.1 % (ref 0–1.9)
DIFFERENTIAL METHOD BLD: ABNORMAL
EOSINOPHIL # BLD AUTO: 0.2 K/UL (ref 0–0.5)
EOSINOPHIL NFR BLD: 5.4 % (ref 0–8)
ERYTHROCYTE [DISTWIDTH] IN BLOOD BY AUTOMATED COUNT: 16.8 % (ref 11.5–14.5)
HCT VFR BLD AUTO: 23.6 % (ref 37–48.5)
HGB BLD-MCNC: 7.8 G/DL (ref 12–16)
IMM GRANULOCYTES # BLD AUTO: 0.02 K/UL (ref 0–0.04)
IMM GRANULOCYTES NFR BLD AUTO: 0.5 % (ref 0–0.5)
LYMPHOCYTES # BLD AUTO: 0.9 K/UL (ref 1–4.8)
LYMPHOCYTES NFR BLD: 20.4 % (ref 18–48)
MCH RBC QN AUTO: 36.1 PG (ref 27–31)
MCHC RBC AUTO-ENTMCNC: 33.1 G/DL (ref 32–36)
MCV RBC AUTO: 109 FL (ref 82–98)
MONOCYTES # BLD AUTO: 0.4 K/UL (ref 0.3–1)
MONOCYTES NFR BLD: 9.5 % (ref 4–15)
NEUTROPHILS # BLD AUTO: 2.8 K/UL (ref 1.8–7.7)
NEUTROPHILS NFR BLD: 63.1 % (ref 38–73)
NRBC BLD-RTO: 0 /100 WBC
PLATELET # BLD AUTO: 133 K/UL (ref 150–450)
PMV BLD AUTO: 8.9 FL (ref 9.2–12.9)
RBC # BLD AUTO: 2.16 M/UL (ref 4–5.4)
WBC # BLD AUTO: 4.41 K/UL (ref 3.9–12.7)

## 2024-02-20 PROCEDURE — 85025 COMPLETE CBC W/AUTO DIFF WBC: CPT | Performed by: FAMILY MEDICINE

## 2024-03-20 ENCOUNTER — PATIENT MESSAGE (OUTPATIENT)
Dept: ADMINISTRATIVE | Facility: HOSPITAL | Age: 69
End: 2024-03-20
Payer: MEDICARE

## 2024-03-27 ENCOUNTER — EXTERNAL HOME HEALTH (OUTPATIENT)
Dept: HOME HEALTH SERVICES | Facility: HOSPITAL | Age: 69
End: 2024-03-27
Payer: MEDICARE

## 2024-04-05 ENCOUNTER — DOCUMENT SCAN (OUTPATIENT)
Dept: HOME HEALTH SERVICES | Facility: HOSPITAL | Age: 69
End: 2024-04-05
Payer: MEDICARE

## 2024-04-15 ENCOUNTER — OFFICE VISIT (OUTPATIENT)
Dept: FAMILY MEDICINE | Facility: CLINIC | Age: 69
End: 2024-04-15
Payer: MEDICARE

## 2024-04-15 VITALS — SYSTOLIC BLOOD PRESSURE: 110 MMHG | DIASTOLIC BLOOD PRESSURE: 60 MMHG

## 2024-04-15 DIAGNOSIS — E03.9 ACQUIRED HYPOTHYROIDISM: ICD-10-CM

## 2024-04-15 DIAGNOSIS — I70.0 AORTIC ATHEROSCLEROSIS: Primary | ICD-10-CM

## 2024-04-15 PROCEDURE — 99213 OFFICE O/P EST LOW 20 MIN: CPT | Mod: 95,,, | Performed by: FAMILY MEDICINE

## 2024-04-15 RX ORDER — FOLIC ACID 1 MG/1
1000 TABLET ORAL DAILY
Qty: 90 TABLET | Refills: 3 | Status: SHIPPED | OUTPATIENT
Start: 2024-04-15

## 2024-04-15 RX ORDER — PANTOPRAZOLE SODIUM 40 MG/1
40 TABLET, DELAYED RELEASE ORAL 2 TIMES DAILY
Qty: 180 TABLET | Refills: 3 | Status: SHIPPED | OUTPATIENT
Start: 2024-04-15

## 2024-04-15 RX ORDER — LEVOTHYROXINE SODIUM 137 UG/1
137 TABLET ORAL
Qty: 90 TABLET | Refills: 3 | Status: SHIPPED | OUTPATIENT
Start: 2024-04-15

## 2024-04-15 NOTE — PROGRESS NOTES
The patient location is: [place]  The chief complaint leading to consultation is: Follow up. Frequent urination    Visit type: audiovisual    Face to Face time with patient: 9 minutes  10 minutes of total time spent on the encounter, which includes face to face time and non-face to face time preparing to see the patient (eg, review of tests), Obtaining and/or reviewing separately obtained history, Documenting clinical information in the electronic or other health record, Independently interpreting results (not separately reported) and communicating results to the patient/family/caregiver, or Care coordination (not separately reported).         Each patient to whom he or she provides medical services by telemedicine is:  (1) informed of the relationship between the physician and patient and the respective role of any other health care provider with respect to management of the patient; and (2) notified that he or she may decline to receive medical services by telemedicine and may withdraw from such care at any time.    Notes:  Subjective:       Patient ID: Jennifer Hurst is a 68 y.o. female.    Chief Complaint: No chief complaint on file.      Presents today for follow up virtually.      has bad wounds on his feet and therefore cannot lift her to get in and ou to the car so she has not been able to see her hematologist at [facility]. She has an appt on May 24th.     She is incontinent due to the inability to get to the toilet so she has to toilet into the bathroom. She stays on the couch and cannot get off the couch so a bedside commode would not help her. She is having to do bed baths.               Review of Systems   Constitutional:  Positive for activity change. Negative for unexpected weight change.   HENT:  Negative for hearing loss, rhinorrhea and trouble swallowing.    Eyes:  Negative for discharge and visual disturbance.   Respiratory:  Negative for chest tightness and wheezing.     Cardiovascular:  Negative for chest pain and palpitations.   Gastrointestinal:  Negative for blood in stool, constipation, diarrhea and vomiting.   Endocrine: Negative for polydipsia and polyuria.   Genitourinary:  Negative for difficulty urinating, dysuria, hematuria and menstrual problem.   Musculoskeletal:  Negative for arthralgias, joint swelling and neck pain.   Neurological:  Negative for weakness and headaches.   Psychiatric/Behavioral:  Negative for confusion and dysphoric mood.          Objective:      Physical Exam  Constitutional:       General: She is not in acute distress.     Appearance: Normal appearance. She is obese. She is not ill-appearing.      Comments: Home bound/couch bound- stays sitting on couch.    Pulmonary:      Effort: Pulmonary effort is normal. No respiratory distress.   Neurological:      Mental Status: She is alert.   Psychiatric:         Mood and Affect: Mood normal.         Behavior: Behavior normal.         Thought Content: Thought content normal.         Judgment: Judgment normal.         Assessment:       1. Aortic atherosclerosis    2. Acquired hypothyroidism        Plan:       Problem List Items Addressed This Visit          Cardiac/Vascular    Aortic atherosclerosis - Primary     Noted on old CT. Maintain healthy diet              Endocrine    Acquired hypothyroidism    Relevant Medications    levothyroxine (SYNTHROID) 137 MCG Tab tablet

## 2024-05-21 ENCOUNTER — TELEPHONE (OUTPATIENT)
Dept: FAMILY MEDICINE | Facility: CLINIC | Age: 69
End: 2024-05-21
Payer: MEDICARE

## 2024-05-21 NOTE — TELEPHONE ENCOUNTER
Informed pt she will need to contact Dr. Patel's office to see what blood work is needed. Pt voiced understanding.

## 2024-05-21 NOTE — TELEPHONE ENCOUNTER
----- Message from Lashell Hanks sent at 5/21/2024  4:16 PM CDT -----  Contact: Self  Pt is calling in regarding to her appt Friday with the Hematologist, but she is requesting to see if we can have the Home Health Nurse at [facility] come out before the appt to have the labs drawn since she is the only one that can get this drawn since she uses the 36 gauge needle and can find her vein and everywhere else tells her that don't have needles this small. Is there anyway we can get this ordered by tomorrow so she can come out on Thursday so Dr Alisa Patel will have the results for Friday. Can we please call pt back to advise at [phone]. Thank You

## 2024-06-11 ENCOUNTER — TELEPHONE (OUTPATIENT)
Dept: FAMILY MEDICINE | Facility: CLINIC | Age: 69
End: 2024-06-11
Payer: MEDICARE

## 2024-06-11 DIAGNOSIS — L98.419 SKIN ULCER OF BUTTOCK, UNSPECIFIED ULCER STAGE: Primary | ICD-10-CM

## 2024-06-11 NOTE — TELEPHONE ENCOUNTER
----- Message from Aniyah Danie sent at 6/10/2024 12:59 PM CDT -----  Type: Needs Medical Advice    Who Called: pt    Best Call Back Number:[phone]    Additional Information: Requesting a call back regarding  pt is asking for office to call her. Pt went to the hematologist and she is not happy with them. Pt said that the office is horrible, office staff does not listen and treating her with disrespect..      Pt said she doesn't think it's her bones and just a vitamin D level issue and asking for labs for that also.    Pt is asking for office to give orders for home health for labs and she has wounds on her vaginal and butt that need to be addressed.       Please Advise- Thank you

## 2024-06-12 NOTE — TELEPHONE ENCOUNTER
[facility] referral placed. Please notify them tomorrow and they will pull the referral from [facility]. I am also not following the information about the labs. Where did she see hematology and did they do bloodwork?

## 2024-06-12 NOTE — TELEPHONE ENCOUNTER
Pt states she saw Dr. Summers with [facility]. She stated that she did not have labs done because of multiple unsuccessful attempts. She states  has drawn her labs and her previous nurse Monalisa with [facility] care is the only one who is able to draw her labs.    Contacted Vasyl Finn- Notified of  referral plan is to admit patient within 24 hours.

## 2024-06-13 ENCOUNTER — LAB VISIT (OUTPATIENT)
Dept: LAB | Facility: HOSPITAL | Age: 69
End: 2024-06-13
Attending: INTERNAL MEDICINE
Payer: MEDICARE

## 2024-06-13 DIAGNOSIS — D53.9 SIMPLE CHRONIC ANEMIA: ICD-10-CM

## 2024-06-13 DIAGNOSIS — D64.9 ANEMIA, UNSPECIFIED TYPE: Primary | ICD-10-CM

## 2024-06-13 LAB
ANISOCYTOSIS BLD QL SMEAR: SLIGHT
BASOPHILS NFR BLD: 1 % (ref 0–1.9)
DACRYOCYTES BLD QL SMEAR: ABNORMAL
DIFFERENTIAL METHOD BLD: ABNORMAL
EOSINOPHIL NFR BLD: 4 % (ref 0–8)
ERYTHROCYTE [DISTWIDTH] IN BLOOD BY AUTOMATED COUNT: 15.8 % (ref 11.5–14.5)
HCT VFR BLD AUTO: 16 % (ref 37–48.5)
HGB BLD-MCNC: 5.3 G/DL (ref 12–16)
HYPOCHROMIA BLD QL SMEAR: ABNORMAL
IMM GRANULOCYTES # BLD AUTO: ABNORMAL K/UL (ref 0–0.04)
IMM GRANULOCYTES NFR BLD AUTO: ABNORMAL % (ref 0–0.5)
LYMPHOCYTES NFR BLD: 33 % (ref 18–48)
MCH RBC QN AUTO: 40.5 PG (ref 27–31)
MCHC RBC AUTO-ENTMCNC: 33.1 G/DL (ref 32–36)
MCV RBC AUTO: 122 FL (ref 82–98)
MONOCYTES NFR BLD: 0 % (ref 4–15)
NEUTROPHILS NFR BLD: 62 % (ref 38–73)
NRBC BLD-RTO: 0 /100 WBC
OVALOCYTES BLD QL SMEAR: ABNORMAL
PLATELET # BLD AUTO: 74 K/UL (ref 150–450)
PLATELET BLD QL SMEAR: ABNORMAL
PMV BLD AUTO: 9.3 FL (ref 9.2–12.9)
POIKILOCYTOSIS BLD QL SMEAR: SLIGHT
RBC # BLD AUTO: 1.31 M/UL (ref 4–5.4)
VIT B12 SERPL-MCNC: 553 PG/ML (ref 210–950)
WBC # BLD AUTO: 3.53 K/UL (ref 3.9–12.7)

## 2024-06-13 PROCEDURE — 83921 ORGANIC ACID SINGLE QUANT: CPT | Performed by: INTERNAL MEDICINE

## 2024-06-13 PROCEDURE — 85007 BL SMEAR W/DIFF WBC COUNT: CPT | Performed by: INTERNAL MEDICINE

## 2024-06-13 PROCEDURE — 82607 VITAMIN B-12: CPT | Performed by: INTERNAL MEDICINE

## 2024-06-13 PROCEDURE — 85027 COMPLETE CBC AUTOMATED: CPT | Performed by: INTERNAL MEDICINE

## 2024-06-13 PROCEDURE — 36415 COLL VENOUS BLD VENIPUNCTURE: CPT | Performed by: INTERNAL MEDICINE

## 2024-06-14 ENCOUNTER — OFFICE VISIT (OUTPATIENT)
Dept: FAMILY MEDICINE | Facility: CLINIC | Age: 69
End: 2024-06-14
Payer: MEDICARE

## 2024-06-14 DIAGNOSIS — D61.818 PANCYTOPENIA: Primary | ICD-10-CM

## 2024-06-14 PROCEDURE — 99214 OFFICE O/P EST MOD 30 MIN: CPT | Mod: 95,,, | Performed by: FAMILY MEDICINE

## 2024-06-14 NOTE — ASSESSMENT & PLAN NOTE
Low blood counts universally. Discussed that she was offered hospice due to low blood counts and did not want bone marrow biopsy.

## 2024-06-14 NOTE — PATIENT INSTRUCTIONS
Thank you for allowing me to participate in your care today. It is an honor to be a part of your healthcare team at [facility]. If you had labs ordered today, you will receive notification via [place], phone call or mailed letter regarding your results within 7 days. If you have any questions or concerns regarding your visit today, please do not hesitate to contact us.  Sincerely,   Joleen Kathleen M.D.

## 2024-06-14 NOTE — PROGRESS NOTES
The patient location is: [place]  The chief complaint leading to consultation is: follow up from hematologist    Visit type: audiovisual    Face to Face time with patient: 13 minutes  15 minutes of total time spent on the encounter, which includes face to face time and non-face to face time preparing to see the patient (eg, review of tests), Obtaining and/or reviewing separately obtained history, Documenting clinical information in the electronic or other health record, Independently interpreting results (not separately reported) and communicating results to the patient/family/caregiver, or Care coordination (not separately reported).         Each patient to whom he or she provides medical services by telemedicine is:  (1) informed of the relationship between the physician and patient and the respective role of any other health care provider with respect to management of the patient; and (2) notified that he or she may decline to receive medical services by telemedicine and may withdraw from such care at any time.    Notes:  Subjective:       Patient ID: Jennifer Hurst is a 68 y.o. female.    Chief Complaint: No chief complaint on file.      Ms. Susan Hurst tells me that her blood level is very low and Dr. Anderson wants to refer her to hospice. She tells me she is not that ill she is just weak from low blood. Hemoglobin is 5.3.   She reports that Dr. Anderson is aware but did not order blood.   Requested HH yesterday for wounds on buttocks and labia. HH has been out and reports that it is escoriated and healing and the vulva is still sore per patient.       Review of Systems   Constitutional:  Negative for activity change and unexpected weight change.   HENT:  Negative for hearing loss, rhinorrhea and trouble swallowing.    Eyes:  Negative for discharge and visual disturbance.   Respiratory:  Negative for chest tightness and wheezing.    Cardiovascular:  Negative for chest pain and palpitations.   Gastrointestinal:   Negative for blood in stool, constipation, diarrhea and vomiting.   Endocrine: Negative for polydipsia and polyuria.   Genitourinary:  Negative for difficulty urinating, dysuria, hematuria and menstrual problem.   Musculoskeletal:  Negative for arthralgias, joint swelling and neck pain.   Neurological:  Positive for weakness. Negative for headaches.   Psychiatric/Behavioral:  Negative for confusion and dysphoric mood.          Objective:      Physical Exam  Constitutional:       General: She is not in acute distress.     Appearance: Normal appearance. She is obese. She is ill-appearing.      Comments: Home bound/couch bound- stays sitting on couch. Pale appearance   Pulmonary:      Effort: Pulmonary effort is normal. No respiratory distress.   Neurological:      Mental Status: She is alert.   Psychiatric:         Mood and Affect: Mood normal.         Behavior: Behavior normal.         Thought Content: Thought content normal.         Judgment: Judgment normal.         Assessment:       1. Pancytopenia        Plan:       Problem List Items Addressed This Visit          Oncology    Pancytopenia - Primary     Low blood counts universally. Discussed that she was offered hospice due to low blood counts and did not want bone marrow biopsy.

## 2024-06-18 LAB — METHYLMALONATE SERPL-SCNC: 0.37 UMOL/L

## 2024-06-19 ENCOUNTER — PATIENT OUTREACH (OUTPATIENT)
Dept: ADMINISTRATIVE | Facility: HOSPITAL | Age: 69
End: 2024-06-19
Payer: MEDICARE

## 2024-06-19 DIAGNOSIS — Z12.31 ENCOUNTER FOR SCREENING MAMMOGRAM FOR BREAST CANCER: Primary | ICD-10-CM

## 2024-06-19 NOTE — PROGRESS NOTES
Population Health Chart Review & Patient Outreach Details      Additional [facility] Notes:               Updates Requested / Reviewed:      Updated Care Coordination Note, Care Everywhere, and Care Team Updated         Health Maintenance Topics Overdue:      VBHM Score: 2     Osteoporosis Screening  Mammogram    Pneumonia Vaccine  Tetanus Vaccine  Shingles/Zoster Vaccine  RSV Vaccine                  Health Maintenance Topic(s) Outreach Outcomes & Actions Taken:    Breast Cancer Screening - Outreach Outcomes & Actions Taken  : Mammogram Order Placed

## 2024-06-24 PROCEDURE — G0180 MD CERTIFICATION HHA PATIENT: HCPCS | Mod: ,,, | Performed by: FAMILY MEDICINE

## 2024-06-26 ENCOUNTER — TELEPHONE (OUTPATIENT)
Dept: FAMILY MEDICINE | Facility: CLINIC | Age: 69
End: 2024-06-26
Payer: MEDICARE

## 2024-06-26 NOTE — TELEPHONE ENCOUNTER
----- Message from Sofia Telles sent at 6/26/2024  9:49 AM CDT -----  Regarding: give update  Contact: pt  Type:  Needs Medical Advice    Who Called: pt  Would the patient rather a call back or a response via [facility]? Call back  Best Call Back Number: [phone]    Additional Information: she wants to give a hospital update and wants to know when will the Dr return--please advise

## 2024-07-09 ENCOUNTER — OFFICE VISIT (OUTPATIENT)
Dept: FAMILY MEDICINE | Facility: CLINIC | Age: 69
End: 2024-07-09
Payer: MEDICARE

## 2024-07-09 ENCOUNTER — PATIENT MESSAGE (OUTPATIENT)
Dept: FAMILY MEDICINE | Facility: CLINIC | Age: 69
End: 2024-07-09
Payer: MEDICARE

## 2024-07-09 DIAGNOSIS — Z09 HOSPITAL DISCHARGE FOLLOW-UP: Primary | ICD-10-CM

## 2024-07-09 DIAGNOSIS — E83.42 HYPOMAGNESEMIA: ICD-10-CM

## 2024-07-09 DIAGNOSIS — E03.9 ACQUIRED HYPOTHYROIDISM: ICD-10-CM

## 2024-07-09 DIAGNOSIS — I48.11 LONGSTANDING PERSISTENT ATRIAL FIBRILLATION: ICD-10-CM

## 2024-07-09 DIAGNOSIS — D53.9 MACROCYTIC ANEMIA: ICD-10-CM

## 2024-07-09 PROCEDURE — 99214 OFFICE O/P EST MOD 30 MIN: CPT | Mod: 95,,, | Performed by: NURSE PRACTITIONER

## 2024-07-09 RX ORDER — METOPROLOL SUCCINATE 50 MG/1
50 TABLET, EXTENDED RELEASE ORAL DAILY
Qty: 90 TABLET | Refills: 1 | Status: SHIPPED | OUTPATIENT
Start: 2024-07-09

## 2024-07-09 RX ORDER — METOPROLOL SUCCINATE 50 MG/1
50 TABLET, EXTENDED RELEASE ORAL DAILY
COMMUNITY
Start: 2024-06-23 | End: 2024-07-09 | Stop reason: SDUPTHER

## 2024-07-09 NOTE — PROGRESS NOTES
Subjective:       Patient ID: Jennifer Hurst is a 68 y.o. female.    Chief Complaint: No chief complaint on file.    The patient location is: [facility]   The chief complaint leading to consultation is: hospital follow up     Visit type: audiovisual    Face to Face time with patient:   30 minutes of total time spent on the encounter, which includes face to face time and non-face to face time preparing to see the patient (eg, review of tests), Obtaining and/or reviewing separately obtained history, Documenting clinical information in the electronic or other health record, Independently interpreting results (not separately reported) and communicating results to the patient/family/caregiver, or Care coordination (not separately reported).         Each patient to whom he or she provides medical services by telemedicine is:  (1) informed of the relationship between the physician and patient and the respective role of any other health care provider with respect to management of the patient; and (2) notified that he or she may decline to receive medical services by telemedicine and may withdraw from such care at any time.    Notes:     Jennifer Hurst request virtual visit today for hospital follow up  Established patient within the clinic, new patient to myself  Reports she presented to the ER at [facility]-   Admitted    Date(s): 6/19/24 - 6/23/24  Reports she received blood transfusion x4, underwent bone marrow biopsy on 6/21/2024- reports she has not heard anything related to these results.   Was evaluated by cardiology due to increased heart rate into the 150's  She was sent home on Toprol XR 50 mg daily- needing refills on this as she reports she was only prescribed 1 month.   Would like to discuss cardiology referral- reports currently unable to make it to the office for an appointment as her  is undergoing wound care on his foot and is not able to lift her at this time  Does have [facility]- would  like blood work ordered to monitor condition following her discharge.   Encounter Diagnosis  Atrial fibrillation with RVR (Discharge Diagnosis) - 6/19/24  Anemia (Discharge Diagnosis) - 6/19/24  Hypomagnesemia (Discharge Diagnosis) - 6/19/24  Hypothyroidism (Discharge Diagnosis) - 6/19/24  Hypokalemia (Discharge Diagnosis) - 6/19/24  CKD stage 3b, GFR 30-44 ml/min (Discharge Diagnosis) - 6/19/24  Parkinson disease (Discharge Diagnosis) - 6/19/24  Candidal intertrigo (Discharge Diagnosis) - 6/20/24  HTN (hypertension) (Discharge Diagnosis) - 6/19/24  Open wound of genital labia (Discharge Diagnosis) - 6/20/24  Xerosis of skin (Discharge Diagnosis) - 6/20/24  Dermatitis associated with moisture (Discharge Diagnosis) - 6/20/24  Discharge Disposition: Home-Routine  Attending Physician: Hussein Madera MD  Admitting Physician: Hussein Madera MD            Review of Systems   Constitutional:  Positive for activity change. Negative for unexpected weight change.   HENT:  Negative for hearing loss, rhinorrhea and trouble swallowing.    Eyes:  Negative for discharge and visual disturbance.   Respiratory:  Negative for chest tightness and wheezing.    Cardiovascular:  Positive for palpitations. Negative for chest pain.   Gastrointestinal:  Negative for blood in stool, constipation, diarrhea and vomiting.   Endocrine: Negative for polydipsia and polyuria.   Genitourinary:  Negative for difficulty urinating, dysuria, hematuria and menstrual problem.   Musculoskeletal:  Negative for arthralgias and neck pain.   Neurological:  Negative for weakness and headaches.   Psychiatric/Behavioral:  Negative for confusion and dysphoric mood.        Patient Active Problem List   Diagnosis    Acquired hypothyroidism    A-fib    Macrocytic anemia    Anxiety    Ataxic gait    Bilateral carpal tunnel syndrome    Stage 3b chronic kidney disease    Essential hypertension    Gastroesophageal reflux disease without esophagitis    Hypokalemia    Hypomagnesemia     Menopause present    Parkinson's disease    Platelets decreased    Arthritis    Vitamin D deficiency    Aortic atherosclerosis    Pancytopenia       Objective:      Physical Exam  Constitutional:       General: She is not in acute distress.     Appearance: Normal appearance.   Pulmonary:      Effort: Pulmonary effort is normal. No respiratory distress.   Neurological:      Mental Status: She is alert and oriented to person, place, and time.   Psychiatric:         Mood and Affect: Mood normal.         Behavior: Behavior normal.       Limited physical examination due to virtual visit   Lab Results   Component Value Date    WBC 3.53 (L) 06/13/2024    HGB 5.3 (LL) 06/13/2024    HCT 16.0 (LL) 06/13/2024    PLT 74 (L) 06/13/2024    ALT 8 (L) 10/19/2023    AST 35 10/19/2023     01/04/2024    K 3.6 01/04/2024     01/04/2024    CREATININE 1.3 01/04/2024    BUN 17 01/04/2024    CO2 18 (L) 01/04/2024    TSH 2.63 10/20/2023    INR 1.0 11/11/2022    HGBA1C 4.6 09/08/2021     The ASCVD Risk score (Deirdre DK, et al., 2019) failed to calculate for the following reasons:    Cannot find a previous HDL lab    Cannot find a previous total cholesterol lab  There were no vitals taken for this visit.   Assessment:       1. Hospital discharge follow-up    2. Acquired hypothyroidism    3. Macrocytic anemia    4. Longstanding persistent atrial fibrillation    5. Hypomagnesemia        Plan:       1. Hospital discharge follow-up  -     SUBSEQUENT HOME HEALTH ORDERS  Subsequent home health orders sent to [facility] for blood work  2. Acquired hypothyroidism  -     TSH; Future; Expected date: 07/09/2024  -     SUBSEQUENT HOME HEALTH ORDERS    3. Macrocytic anemia  -     CBC Auto Differential; Future; Expected date: 07/09/2024  Followed by hematology/oncology- Dr. Anderson: encouraged follow up appointment   4. Longstanding persistent atrial fibrillation  -     Comprehensive Metabolic Panel; Future; Expected date: 07/09/2024  -      Magnesium; Future; Expected date: 07/09/2024  -     Ambulatory referral/consult to Cardiology; Future; Expected date: 07/16/2024  -     metoprolol succinate (TOPROL XL) 50 MG 24 hr tablet; Take 1 tablet (50 mg total) by mouth once daily.  Dispense: 90 tablet; Refill: 1  Continue current medication regimen- refills provided  Cardiology referral   5. Hypomagnesemia  -     Magnesium; Future; Expected date: 07/09/2024  -     SUBSEQUENT HOME HEALTH ORDERS       Follow up in about 2 weeks (around 7/23/2024) for Virtual Visit.

## 2024-07-15 ENCOUNTER — TELEPHONE (OUTPATIENT)
Dept: FAMILY MEDICINE | Facility: CLINIC | Age: 69
End: 2024-07-15
Payer: MEDICARE

## 2024-07-15 NOTE — TELEPHONE ENCOUNTER
Noted.   
Received faxed blood work from [facility]  Collected: 7/10/2024    When compared to blood work at hospital discharge blood work is stable.    CMP:  Sodium: 134  Renal function: slightly decreased, but stable    Magnesium 1.5  Not on a supplement.    TSH 6.19- improving last level was 8.23    CBC:   H&H: 7.7/25/7- was 5.3/16 on admission prior to blood transfusions.       
no

## 2024-07-19 ENCOUNTER — OFFICE VISIT (OUTPATIENT)
Dept: FAMILY MEDICINE | Facility: CLINIC | Age: 69
End: 2024-07-19
Payer: MEDICARE

## 2024-07-19 DIAGNOSIS — D61.818 PANCYTOPENIA: ICD-10-CM

## 2024-07-19 DIAGNOSIS — I10 ESSENTIAL HYPERTENSION: Primary | ICD-10-CM

## 2024-07-19 DIAGNOSIS — D69.6 PLATELETS DECREASED: ICD-10-CM

## 2024-07-19 DIAGNOSIS — R60.0 LOWER EXTREMITY EDEMA: ICD-10-CM

## 2024-07-19 DIAGNOSIS — D53.9 MACROCYTIC ANEMIA: ICD-10-CM

## 2024-07-19 PROCEDURE — 99214 OFFICE O/P EST MOD 30 MIN: CPT | Mod: 95,,, | Performed by: FAMILY MEDICINE

## 2024-07-19 NOTE — PROGRESS NOTES
The patient location is: [place]  The chief complaint leading to consultation is: swelling, anemia    Visit type: audiovisual    Face to Face time with patient: 9 minutes  11 minutes of total time spent on the encounter, which includes face to face time and non-face to face time preparing to see the patient (eg, review of tests), Obtaining and/or reviewing separately obtained history, Documenting clinical information in the electronic or other health record, Independently interpreting results (not separately reported) and communicating results to the patient/family/caregiver, or Care coordination (not separately reported).         Each patient to whom he or she provides medical services by telemedicine is:  (1) informed of the relationship between the physician and patient and the respective role of any other health care provider with respect to management of the patient; and (2) notified that he or she may decline to receive medical services by telemedicine and may withdraw from such care at any time.    Notes:  Subjective:       Patient ID: Jennifer Hurst is a 68 y.o. female.    Chief Complaint: No chief complaint on file.      Ms. Susan Hurst reports that her hemoglobin went from 9.3 down to 7 something.   She would like to check it again.   She is about to be discharged for her perineal wounds.   She feels like her blood was not getting any better.  She has not had the bone marrow test.   She has not been able to see Dr. Jaffe because she cannot tell her the results.   She is not going to office visits in person which is the issue.   She pays for transportation but her  has to get her in and out of the car and he not because he is having foot issues.         Review of Systems   Constitutional:  Negative for activity change and unexpected weight change.   HENT:  Negative for hearing loss, rhinorrhea and trouble swallowing.    Eyes:  Negative for discharge and visual disturbance.   Respiratory:  Negative  for chest tightness and wheezing.    Cardiovascular:  Positive for palpitations. Negative for chest pain.   Gastrointestinal:  Negative for blood in stool, constipation, diarrhea and vomiting.   Endocrine: Negative for polydipsia and polyuria.   Genitourinary:  Negative for difficulty urinating, dysuria, hematuria and menstrual problem.   Musculoskeletal:  Negative for arthralgias, joint swelling and neck pain.   Neurological:  Negative for weakness and headaches.   Psychiatric/Behavioral:  Negative for confusion and dysphoric mood.          Objective:      Physical Exam  Constitutional:       General: She is not in acute distress.     Comments: Pale appearing   Pulmonary:      Effort: Pulmonary effort is normal. No respiratory distress.   Neurological:      Mental Status: She is alert and oriented to person, place, and time.   Psychiatric:         Mood and Affect: Mood normal.         Behavior: Behavior normal.       Reports swelling in legs.   Assessment:       1. Essential hypertension    2. Platelets decreased    3. Pancytopenia    4. Macrocytic anemia    5. Lower extremity edema        Plan:       Problem List Items Addressed This Visit          Cardiac/Vascular    Essential hypertension - Primary    Relevant Orders    Basic Metabolic Panel       Hematology    Platelets decreased    Relevant Orders    CBC Without Differential       Oncology    Macrocytic anemia    Relevant Orders    CBC Without Differential    Pancytopenia    Relevant Orders    CBC Without Differential     Other Visit Diagnoses       Lower extremity edema

## 2024-07-22 DIAGNOSIS — R60.0 LOWER EXTREMITY EDEMA: ICD-10-CM

## 2024-07-22 NOTE — TELEPHONE ENCOUNTER
----- Message from Terrence Crook sent at 7/22/2024  4:01 PM CDT -----  Regarding: refill  Contact: patient  Type:  RX Refill Request    Who Called:  patient   Refill or New Rx:  refill  RX Name and Strength:  furosemide (LASIX) 20 MG tablet 60 tablet 0 11/8/2023 -   Sig: TAKE ONE TABLET (20mg) BY MOUTH TWICE DAILY AS NEEDED for edema ##THANK YOU##   Sent to pharmacy as: furosemide (LASIX) 20 MG tablet   E-Prescribing Status: Receipt confirmed by pharmacy (11/8/2023  5:53 PM CST)       How is the patient currently taking it? (ex. 1XDay):  see above   Is this a 30 day or 90 day RX:  see above   Preferred Pharmacy with phone number:    [facility] - [place] - [address]  [address]  [place] [address]  Phone: [phone] Fax: [phone]    Local or Mail Order:  local  Ordering Provider:  Dr Kathleen  [facility] Call Back Number:  [phone]  Additional Information:  thanks

## 2024-07-22 NOTE — TELEPHONE ENCOUNTER
Care Due:                  Date            Visit Type   Department     Provider  --------------------------------------------------------------------------------                                ESTABLISHED                              PATIENT -    [facility]  Last Visit: 07-      [place]      MEDICINE       Joleen Kathleen  Next Visit: None Scheduled  None         None Found                                                            Last  Test          Frequency    Reason                     Performed    Due Date  --------------------------------------------------------------------------------    TSH.........  12 months..  levothyroxine............  Not Found    Overdue    [employer] Embedded Care Due Messages. Reference number: [id number].   7/22/2024 4:50:47 PM CDT

## 2024-07-23 RX ORDER — FUROSEMIDE 20 MG/1
20 TABLET ORAL 2 TIMES DAILY
Qty: 60 TABLET | Refills: 0 | Status: SHIPPED | OUTPATIENT
Start: 2024-07-23

## 2024-07-24 ENCOUNTER — EXTERNAL HOME HEALTH (OUTPATIENT)
Dept: HOME HEALTH SERVICES | Facility: HOSPITAL | Age: 69
End: 2024-07-24
Payer: MEDICARE

## 2024-07-26 ENCOUNTER — DOCUMENT SCAN (OUTPATIENT)
Dept: HOME HEALTH SERVICES | Facility: HOSPITAL | Age: 69
End: 2024-07-26
Payer: MEDICARE

## 2024-07-30 ENCOUNTER — DOCUMENT SCAN (OUTPATIENT)
Dept: HOME HEALTH SERVICES | Facility: HOSPITAL | Age: 69
End: 2024-07-30
Payer: MEDICARE

## 2024-07-30 DIAGNOSIS — Z00.00 ENCOUNTER FOR MEDICARE ANNUAL WELLNESS EXAM: ICD-10-CM

## 2024-08-06 ENCOUNTER — TELEPHONE (OUTPATIENT)
Dept: FAMILY MEDICINE | Facility: CLINIC | Age: 69
End: 2024-08-06
Payer: MEDICARE

## 2024-08-12 ENCOUNTER — TELEPHONE (OUTPATIENT)
Dept: FAMILY MEDICINE | Facility: CLINIC | Age: 69
End: 2024-08-12
Payer: MEDICARE

## 2024-08-12 NOTE — TELEPHONE ENCOUNTER
----- Message from Anita Tariq sent at 8/12/2024  8:08 AM CDT -----  Type: Needs Medical Advice  Who Called:  pt  Best Call Back Number: [phone]  Additional Information: pt is calling in regards to wanting to get new orders for a blood test. Needs to speak to a nurse in office. Please call back and advise. Thanks!

## 2024-08-14 ENCOUNTER — LAB VISIT (OUTPATIENT)
Dept: LAB | Facility: HOSPITAL | Age: 69
End: 2024-08-14
Attending: FAMILY MEDICINE
Payer: MEDICARE

## 2024-08-14 DIAGNOSIS — D69.6 PLATELETS DECREASED: ICD-10-CM

## 2024-08-14 DIAGNOSIS — D61.818 PANCYTOPENIA: ICD-10-CM

## 2024-08-14 DIAGNOSIS — I10 ESSENTIAL HYPERTENSION: ICD-10-CM

## 2024-08-14 DIAGNOSIS — D53.9 MACROCYTIC ANEMIA: ICD-10-CM

## 2024-08-14 LAB
ANION GAP SERPL CALC-SCNC: 18 MMOL/L (ref 8–16)
BUN SERPL-MCNC: 34 MG/DL (ref 8–23)
CALCIUM SERPL-MCNC: 8.8 MG/DL (ref 8.7–10.5)
CHLORIDE SERPL-SCNC: 94 MMOL/L (ref 95–110)
CO2 SERPL-SCNC: 17 MMOL/L (ref 23–29)
CREAT SERPL-MCNC: 2.2 MG/DL (ref 0.5–1.4)
ERYTHROCYTE [DISTWIDTH] IN BLOOD BY AUTOMATED COUNT: 21.7 % (ref 11.5–14.5)
EST. GFR  (NO RACE VARIABLE): 23.5 ML/MIN/1.73 M^2
GLUCOSE SERPL-MCNC: 74 MG/DL (ref 70–110)
HCT VFR BLD AUTO: 17.6 % (ref 37–48.5)
HGB BLD-MCNC: 5.8 G/DL (ref 12–16)
MCH RBC QN AUTO: 37.7 PG (ref 27–31)
MCHC RBC AUTO-ENTMCNC: 33 G/DL (ref 32–36)
MCV RBC AUTO: 114 FL (ref 82–98)
PLATELET # BLD AUTO: 125 K/UL (ref 150–450)
PMV BLD AUTO: 9.8 FL (ref 9.2–12.9)
POTASSIUM SERPL-SCNC: 3.9 MMOL/L (ref 3.5–5.1)
RBC # BLD AUTO: 1.54 M/UL (ref 4–5.4)
SODIUM SERPL-SCNC: 129 MMOL/L (ref 136–145)
WBC # BLD AUTO: 5.47 K/UL (ref 3.9–12.7)

## 2024-08-14 PROCEDURE — 85027 COMPLETE CBC AUTOMATED: CPT | Performed by: FAMILY MEDICINE

## 2024-08-14 PROCEDURE — 80048 BASIC METABOLIC PNL TOTAL CA: CPT | Performed by: FAMILY MEDICINE

## 2024-08-15 ENCOUNTER — TELEPHONE (OUTPATIENT)
Dept: FAMILY MEDICINE | Facility: CLINIC | Age: 69
End: 2024-08-15
Payer: MEDICARE

## 2024-08-15 NOTE — PROGRESS NOTES
Received a phone call regarding critical lab result. H/h low again.   Reviewed other resulted labs- patient with worsening renal function as well.     Recommend ED for transfusion and hydration with severe anemia and CKD.

## 2024-08-15 NOTE — TELEPHONE ENCOUNTER
----- Message from Joleen Kathleen MD sent at 8/15/2024  9:13 AM CDT -----  Please call Ms. Susan Hurst and let her know that with low H/H, Low sodium, and significant worsening kidney function that she should be evaluated in the ER. - Dr. Kathleen

## 2024-08-15 NOTE — TELEPHONE ENCOUNTER
"Spoke with patient reviewed lab results. Verbalized understanding. She states " I will go to the hospital but I need to have a bowel movement first I've been at this for two weeks" I informed the patient with her critical results it was best to report to the ER immediately. They will also be able to address that when admitted. She states "I can't go today I need to be transported by [facility] and I need to get things together because I will be there overnight this happens every time." I asked patient if she knows someone who could take her and help her get there this evening she told me she does not. Patient states " I will go in the morning and I need Dr. Kathleen to tell them I need them to bring me back" I notified the patient to let the hospital staff know that she will require transport assistance by [facility] as we do not have access to coordinated this. Pt agreed. Educated the patient on this importance of going immediately due to critical results. Pt declined to go AMA. Pt states "I will go in the morning."  "

## 2024-08-15 NOTE — TELEPHONE ENCOUNTER
Attempted to contact Jennifer Hurst to discuss results/ER recommended  .    No voicemail setup,  Will try again at later time       Raven Gutierrez LPN

## 2024-08-16 ENCOUNTER — DOCUMENT SCAN (OUTPATIENT)
Dept: HOME HEALTH SERVICES | Facility: HOSPITAL | Age: 69
End: 2024-08-16
Payer: MEDICARE

## 2024-08-16 ENCOUNTER — HOSPITAL ENCOUNTER (OUTPATIENT)
Facility: HOSPITAL | Age: 69
Discharge: HOME HEALTH | End: 2024-08-17
Attending: EMERGENCY MEDICINE | Admitting: STUDENT IN AN ORGANIZED HEALTH CARE EDUCATION/TRAINING PROGRAM
Payer: MEDICARE

## 2024-08-16 ENCOUNTER — TELEPHONE (OUTPATIENT)
Dept: FAMILY MEDICINE | Facility: CLINIC | Age: 69
End: 2024-08-16
Payer: MEDICARE

## 2024-08-16 DIAGNOSIS — D53.9 MACROCYTIC ANEMIA: ICD-10-CM

## 2024-08-16 DIAGNOSIS — Z74.01 BEDBOUND: ICD-10-CM

## 2024-08-16 DIAGNOSIS — R53.1 WEAKNESS: ICD-10-CM

## 2024-08-16 DIAGNOSIS — D64.9 SYMPTOMATIC ANEMIA: Primary | ICD-10-CM

## 2024-08-16 DIAGNOSIS — I48.11 LONGSTANDING PERSISTENT ATRIAL FIBRILLATION: ICD-10-CM

## 2024-08-16 LAB
ABO + RH BLD: NORMAL
ANION GAP SERPL CALC-SCNC: 15 MMOL/L (ref 8–16)
BASOPHILS # BLD AUTO: 0.05 K/UL (ref 0–0.2)
BASOPHILS NFR BLD: 0.9 % (ref 0–1.9)
BLD GP AB SCN CELLS X3 SERPL QL: NORMAL
BLD PROD TYP BPU: NORMAL
BLD PROD TYP BPU: NORMAL
BLOOD UNIT EXPIRATION DATE: NORMAL
BLOOD UNIT EXPIRATION DATE: NORMAL
BLOOD UNIT TYPE CODE: 600
BLOOD UNIT TYPE CODE: 600
BLOOD UNIT TYPE: NORMAL
BLOOD UNIT TYPE: NORMAL
BUN SERPL-MCNC: 33 MG/DL (ref 8–23)
CALCIUM SERPL-MCNC: 8.4 MG/DL (ref 8.7–10.5)
CHLORIDE SERPL-SCNC: 99 MMOL/L (ref 95–110)
CO2 SERPL-SCNC: 18 MMOL/L (ref 23–29)
CODING SYSTEM: NORMAL
CODING SYSTEM: NORMAL
CREAT SERPL-MCNC: 2.2 MG/DL (ref 0.5–1.4)
CROSSMATCH INTERPRETATION: NORMAL
CROSSMATCH INTERPRETATION: NORMAL
DIFFERENTIAL METHOD BLD: ABNORMAL
DISPENSE STATUS: NORMAL
DISPENSE STATUS: NORMAL
EOSINOPHIL # BLD AUTO: 0.2 K/UL (ref 0–0.5)
EOSINOPHIL NFR BLD: 2.9 % (ref 0–8)
ERYTHROCYTE [DISTWIDTH] IN BLOOD BY AUTOMATED COUNT: 20.8 % (ref 11.5–14.5)
EST. GFR  (NO RACE VARIABLE): 23.5 ML/MIN/1.73 M^2
GLUCOSE SERPL-MCNC: 96 MG/DL (ref 70–110)
HCT VFR BLD AUTO: 17.6 % (ref 37–48.5)
HGB BLD-MCNC: 5.8 G/DL (ref 12–16)
IMM GRANULOCYTES # BLD AUTO: 0.06 K/UL (ref 0–0.04)
IMM GRANULOCYTES NFR BLD AUTO: 1 % (ref 0–0.5)
IRON SERPL-MCNC: 88 UG/DL (ref 30–160)
LYMPHOCYTES # BLD AUTO: 0.6 K/UL (ref 1–4.8)
LYMPHOCYTES NFR BLD: 10.7 % (ref 18–48)
MCH RBC QN AUTO: 37.4 PG (ref 27–31)
MCHC RBC AUTO-ENTMCNC: 33 G/DL (ref 32–36)
MCV RBC AUTO: 114 FL (ref 82–98)
MONOCYTES # BLD AUTO: 0.7 K/UL (ref 0.3–1)
MONOCYTES NFR BLD: 11.9 % (ref 4–15)
NEUTROPHILS # BLD AUTO: 4.2 K/UL (ref 1.8–7.7)
NEUTROPHILS NFR BLD: 72.6 % (ref 38–73)
NRBC BLD-RTO: 0 /100 WBC
NUM UNITS TRANS PACKED RBC: NORMAL
NUM UNITS TRANS PACKED RBC: NORMAL
OB PNL STL: NEGATIVE
OHS QRS DURATION: 84 MS
OHS QTC CALCULATION: 474 MS
PLATELET # BLD AUTO: 117 K/UL (ref 150–450)
PMV BLD AUTO: 9.3 FL (ref 9.2–12.9)
POTASSIUM SERPL-SCNC: 3.4 MMOL/L (ref 3.5–5.1)
RBC # BLD AUTO: 1.55 M/UL (ref 4–5.4)
RETICS/RBC NFR AUTO: 4.5 % (ref 0.5–2.5)
SATURATED IRON: 55 % (ref 20–50)
SODIUM SERPL-SCNC: 132 MMOL/L (ref 136–145)
SPECIMEN OUTDATE: NORMAL
TOTAL IRON BINDING CAPACITY: 161 UG/DL (ref 250–450)
TRANSFERRIN SERPL-MCNC: 109 MG/DL (ref 200–375)
TROPONIN I SERPL DL<=0.01 NG/ML-MCNC: <0.006 NG/ML (ref 0–0.03)
WBC # BLD AUTO: 5.82 K/UL (ref 3.9–12.7)

## 2024-08-16 PROCEDURE — 84484 ASSAY OF TROPONIN QUANT: CPT | Performed by: EMERGENCY MEDICINE

## 2024-08-16 PROCEDURE — 25000003 PHARM REV CODE 250: Performed by: STUDENT IN AN ORGANIZED HEALTH CARE EDUCATION/TRAINING PROGRAM

## 2024-08-16 PROCEDURE — 83540 ASSAY OF IRON: CPT | Performed by: EMERGENCY MEDICINE

## 2024-08-16 PROCEDURE — 82607 VITAMIN B-12: CPT | Performed by: EMERGENCY MEDICINE

## 2024-08-16 PROCEDURE — 93005 ELECTROCARDIOGRAM TRACING: CPT

## 2024-08-16 PROCEDURE — 99285 EMERGENCY DEPT VISIT HI MDM: CPT | Mod: 25

## 2024-08-16 PROCEDURE — 36415 COLL VENOUS BLD VENIPUNCTURE: CPT | Performed by: EMERGENCY MEDICINE

## 2024-08-16 PROCEDURE — 36430 TRANSFUSION BLD/BLD COMPNT: CPT

## 2024-08-16 PROCEDURE — G0378 HOSPITAL OBSERVATION PER HR: HCPCS

## 2024-08-16 PROCEDURE — 93010 ELECTROCARDIOGRAM REPORT: CPT | Mod: ,,, | Performed by: INTERNAL MEDICINE

## 2024-08-16 PROCEDURE — 86803 HEPATITIS C AB TEST: CPT | Performed by: EMERGENCY MEDICINE

## 2024-08-16 PROCEDURE — 82728 ASSAY OF FERRITIN: CPT | Performed by: EMERGENCY MEDICINE

## 2024-08-16 PROCEDURE — 86920 COMPATIBILITY TEST SPIN: CPT | Performed by: EMERGENCY MEDICINE

## 2024-08-16 PROCEDURE — 86850 RBC ANTIBODY SCREEN: CPT | Performed by: EMERGENCY MEDICINE

## 2024-08-16 PROCEDURE — 25000003 PHARM REV CODE 250: Performed by: EMERGENCY MEDICINE

## 2024-08-16 PROCEDURE — 85025 COMPLETE CBC W/AUTO DIFF WBC: CPT | Performed by: EMERGENCY MEDICINE

## 2024-08-16 PROCEDURE — 96360 HYDRATION IV INFUSION INIT: CPT

## 2024-08-16 PROCEDURE — 85045 AUTOMATED RETICULOCYTE COUNT: CPT | Performed by: EMERGENCY MEDICINE

## 2024-08-16 PROCEDURE — 82746 ASSAY OF FOLIC ACID SERUM: CPT | Performed by: EMERGENCY MEDICINE

## 2024-08-16 PROCEDURE — 86900 BLOOD TYPING SEROLOGIC ABO: CPT | Performed by: EMERGENCY MEDICINE

## 2024-08-16 PROCEDURE — 82272 OCCULT BLD FECES 1-3 TESTS: CPT | Performed by: EMERGENCY MEDICINE

## 2024-08-16 PROCEDURE — 87389 HIV-1 AG W/HIV-1&-2 AB AG IA: CPT | Performed by: EMERGENCY MEDICINE

## 2024-08-16 PROCEDURE — 80048 BASIC METABOLIC PNL TOTAL CA: CPT | Performed by: EMERGENCY MEDICINE

## 2024-08-16 PROCEDURE — P9016 RBC LEUKOCYTES REDUCED: HCPCS | Performed by: EMERGENCY MEDICINE

## 2024-08-16 RX ORDER — POTASSIUM CHLORIDE 750 MG/1
10 TABLET, EXTENDED RELEASE ORAL 2 TIMES DAILY
Status: DISCONTINUED | OUTPATIENT
Start: 2024-08-16 | End: 2024-08-17

## 2024-08-16 RX ORDER — ONDANSETRON HYDROCHLORIDE 2 MG/ML
4 INJECTION, SOLUTION INTRAVENOUS EVERY 8 HOURS PRN
Status: DISCONTINUED | OUTPATIENT
Start: 2024-08-16 | End: 2024-08-17 | Stop reason: HOSPADM

## 2024-08-16 RX ORDER — PANTOPRAZOLE SODIUM 40 MG/1
40 TABLET, DELAYED RELEASE ORAL 2 TIMES DAILY
Status: DISCONTINUED | OUTPATIENT
Start: 2024-08-16 | End: 2024-08-17 | Stop reason: HOSPADM

## 2024-08-16 RX ORDER — ACETAMINOPHEN 325 MG/1
650 TABLET ORAL EVERY 6 HOURS PRN
Status: DISCONTINUED | OUTPATIENT
Start: 2024-08-16 | End: 2024-08-17 | Stop reason: HOSPADM

## 2024-08-16 RX ORDER — FUROSEMIDE 20 MG/1
20 TABLET ORAL 2 TIMES DAILY
Status: DISCONTINUED | OUTPATIENT
Start: 2024-08-17 | End: 2024-08-17 | Stop reason: HOSPADM

## 2024-08-16 RX ORDER — ACETAMINOPHEN 325 MG/1
650 TABLET ORAL EVERY 4 HOURS PRN
Status: DISCONTINUED | OUTPATIENT
Start: 2024-08-16 | End: 2024-08-17 | Stop reason: HOSPADM

## 2024-08-16 RX ORDER — FOLIC ACID 1 MG/1
1000 TABLET ORAL DAILY
Status: DISCONTINUED | OUTPATIENT
Start: 2024-08-17 | End: 2024-08-17 | Stop reason: HOSPADM

## 2024-08-16 RX ORDER — ASPIRIN 81 MG/1
81 TABLET ORAL DAILY
Status: DISCONTINUED | OUTPATIENT
Start: 2024-08-17 | End: 2024-08-17 | Stop reason: HOSPADM

## 2024-08-16 RX ORDER — HYDROCODONE BITARTRATE AND ACETAMINOPHEN 500; 5 MG/1; MG/1
TABLET ORAL
Status: DISCONTINUED | OUTPATIENT
Start: 2024-08-16 | End: 2024-08-17 | Stop reason: HOSPADM

## 2024-08-16 RX ORDER — SODIUM CHLORIDE 0.9 % (FLUSH) 0.9 %
10 SYRINGE (ML) INJECTION
Status: DISCONTINUED | OUTPATIENT
Start: 2024-08-16 | End: 2024-08-17 | Stop reason: HOSPADM

## 2024-08-16 RX ADMIN — PANTOPRAZOLE SODIUM 40 MG: 40 TABLET, DELAYED RELEASE ORAL at 10:08

## 2024-08-16 RX ADMIN — SODIUM CHLORIDE 500 ML: 9 INJECTION, SOLUTION INTRAVENOUS at 03:08

## 2024-08-16 RX ADMIN — POTASSIUM CHLORIDE 10 MEQ: 750 TABLET, EXTENDED RELEASE ORAL at 10:08

## 2024-08-16 NOTE — TELEPHONE ENCOUNTER
----- Message from Maged Gillis sent at 8/16/2024  9:46 AM CDT -----  Contact: self  Type: Needs Medical Advice  Who Called:  PT    Best Call Back Number: [phone]   Additional Information: PT states she is going to the ER early this afternoon.  Please call to advise.

## 2024-08-16 NOTE — ED PROVIDER NOTES
Encounter Date: 8/16/2024       History     Chief Complaint   Patient presents with    Abnormal Lab     Patient states she was notified yesterday of a critical lab by her PCP and was told to come to the ED.       68-year-old female past history of chronic intermittent anemia, chronic kidney disease with multiple transfusions in the past presents for abnormal hemoglobin count on routine labs 2 days ago.  Per report patient was found to have a hemoglobin of 5.8.  She does report some chronic fatigue but denies any specific complaints.  Denies any hematemesis or bloody or black stools.  No history of anticoagulation.  No chest pain or shortness of breath.  No abdominal pain.  No other exacerbating or relieving factors identified.      Review of patient's allergies indicates:   Allergen Reactions    Penicillins Other (See Comments) and Swelling     Other reaction(s): Unknown  Unknown      Carbidopa-levodopa Nausea And Vomiting    Meperidine Nausea And Vomiting    Morphine Nausea And Vomiting     Past Medical History:   Diagnosis Date    Anemia, unspecified     Hypothyroidism, unspecified     Parkinson's disease     Unilateral inguinal hernia, without obstruction or gangrene, not specified as recurrent     Patient reposrts she has 3 hernias    Urinary tract infection, site not specified      Past Surgical History:   Procedure Laterality Date    COLON SURGERY      1990 tumor removal    COLONOSCOPY      10 years ago    endometeriosis      UPPER GASTROINTESTINAL ENDOSCOPY      6 years ago     Family History   Problem Relation Name Age of Onset    Gallbladder disease Mother      Stomach cancer Father      Esophageal cancer Father      Lung cancer Sister      Brain cancer Sister      Colon cancer Neg Hx      Colon polyps Neg Hx       Social History     Tobacco Use    Smoking status: Never     Passive exposure: Current    Smokeless tobacco: Never   Substance Use Topics    Alcohol use: Yes     Comment: couple cocktails a day     Drug use: Never     Review of Systems   Constitutional:  Positive for fatigue. Negative for fever.   HENT:  Negative for sore throat.    Respiratory:  Negative for shortness of breath.    Cardiovascular:  Negative for chest pain.   Gastrointestinal:  Negative for nausea.   Genitourinary:  Negative for dysuria.   Musculoskeletal:  Negative for back pain.   Skin:  Negative for rash.   Neurological:  Negative for weakness.   Hematological:  Does not bruise/bleed easily.       Physical Exam     Initial Vitals   BP Pulse Resp Temp SpO2   08/16/24 1505 08/16/24 1505 08/16/24 1505 08/16/24 1512 08/16/24 1505   (!) 90/51 109 16 98.6 °F (37 °C) 98 %      MAP       --                Physical Exam    Nursing note and vitals reviewed.  Constitutional: She appears well-developed and well-nourished.   HENT:   Head: Normocephalic and atraumatic.   Eyes: EOM are normal. Pupils are equal, round, and reactive to light.   Pale conjunctiva   Neck: Neck supple.   Normal range of motion.  Cardiovascular:  Normal rate and normal heart sounds.           Irregularly, irregular rhythm.  No appreciable murmur   Pulmonary/Chest: Breath sounds normal. No respiratory distress.   Abdominal: Abdomen is soft. Bowel sounds are normal. She exhibits no distension. There is no abdominal tenderness.   Rectal exam without any obvious external hemorrhoids.  Digital exam scant dark brown stool no gross blood.  Chaperone present   Musculoskeletal:         General: Normal range of motion.      Cervical back: Normal range of motion and neck supple.     Neurological: She is alert and oriented to person, place, and time. She has normal strength. No cranial nerve deficit or sensory deficit.   Skin: Skin is warm and dry.   Psychiatric: She has a normal mood and affect.         ED Course   Critical Care    Date/Time: 8/16/2024 3:59 PM    Performed by: Edward Troncoso MD  Authorized by: Edward Troncoso MD  Direct patient critical care time: 10 minutes  Additional  history critical care time: 5 minutes  Ordering / reviewing critical care time: 10 minutes  Documentation critical care time: 5 minutes  Total critical care time (exclusive of procedural time) : 30 minutes  Critical care time was exclusive of separately billable procedures and treating other patients.  Critical care was time spent personally by me on the following activities: development of treatment plan with patient or surrogate, interpretation of cardiac output measurements, evaluation of patient's response to treatment, examination of patient, obtaining history from patient or surrogate, ordering and performing treatments and interventions, ordering and review of laboratory studies and review of old charts.        Labs Reviewed   CBC W/ AUTO DIFFERENTIAL - Abnormal       Result Value    WBC 5.82      RBC 1.55 (*)     Hemoglobin 5.8 (*)     Hematocrit 17.6 (*)      (*)     MCH 37.4 (*)     MCHC 33.0      RDW 20.8 (*)     Platelets 117 (*)     MPV 9.3      Immature Granulocytes 1.0 (*)     Gran # (ANC) 4.2      Immature Grans (Abs) 0.06 (*)     Lymph # 0.6 (*)     Mono # 0.7      Eos # 0.2      Baso # 0.05      nRBC 0      Gran % 72.6      Lymph % 10.7 (*)     Mono % 11.9      Eosinophil % 2.9      Basophil % 0.9      Differential Method Automated      Narrative:     Release to patient->Immediate   Hemoglobin and Hematocrit  critical result(s) called and verbal   readback obtained from Silvino ARRINGTON RN.cd by [facility] 08/16/2024 15:41   BASIC METABOLIC PANEL - Abnormal    Sodium 132 (*)     Potassium 3.4 (*)     Chloride 99      CO2 18 (*)     Glucose 96      BUN 33 (*)     Creatinine 2.2 (*)     Calcium 8.4 (*)     Anion Gap 15      eGFR 23.5 (*)     Narrative:     Release to patient->Immediate  Recoll. [id number] by [facility] at 08/16/2024 15:37, reason: Specimen   hemolyzed   IRON AND TIBC - Abnormal    Iron 88      Transferrin 109 (*)     TIBC 161 (*)     Saturated Iron 55 (*)     Narrative:     Release to  patient->Immediate  Recoll. [id number] by [email or web address] at 08/16/2024 15:37, reason: Specimen   hemolyzed   RETICULOCYTES - Abnormal    Retic 4.5 (*)     Narrative:     Release to patient->Immediate   TROPONIN I    Troponin I <0.006      Narrative:     Release to patient->Immediate  Recoll. [id number] by [email or web address] at 08/16/2024 15:37, reason: Specimen   hemolyzed   OCCULT BLOOD X 1, STOOL    Occult Blood Negative     IRON AND TIBC   FERRITIN   VITAMIN B12   FOLATE   RETICULOCYTES   FERRITIN   TYPE & SCREEN    Group & Rh A NEG      Indirect Jazmyne NEG      Specimen Outdate 08/19/2024 23:59       EKG Readings: (Independently Interpreted)   15 16; AFib 102 beats per minute borderline low voltage inferior lateral T-wave inversions minimal ST depressions     ECG Results              EKG 12-lead (Final result)        Collection Time Result Time QRS Duration OHS QTC Calculation    08/16/24 15:16:47 08/16/24 16:00:38 84 474                     Final result by Interface, Lab In [facility] (08/16/24 16:00:47)                   Narrative:    Test Reason : R53.1,    Vent. Rate : 102 BPM     Atrial Rate : 241 BPM     P-R Int : 000 ms          QRS Dur : 084 ms      QT Int : 364 ms       P-R-T Axes : 000 041 176 degrees     QTc Int : 474 ms    Atrial flutter with variable A-V block  Low voltage QRS  Cannot rule out Anterior infarct ,age undetermined  ST and T wave abnormality, consider inferolateral ischemia  Abnormal ECG  When compared with ECG of 13-SEP-2023 11:55,  Atrial flutter has replaced Sinus rhythm  ST now depressed in Lateral leads  T wave inversion now evident in Inferior leads  T wave inversion now evident in Lateral leads  Confirmed by Keshawn Orantes MD ([id number]) on 8/16/2024 4:00:34 PM    Referred By:             Confirmed By:Keshawn Orantes MD                                  Imaging Results    None          Medications   0.9%  NaCl infusion (for blood administration) (has no administration in time range)   sodium chloride 0.9% flush 10 mL (has  no administration in time range)   acetaminophen tablet 650 mg (has no administration in time range)   ondansetron injection 4 mg (has no administration in time range)   acetaminophen tablet 650 mg (has no administration in time range)   aspirin EC tablet 81 mg (81 mg Oral Given 8/17/24 1001)   folic acid tablet 1,000 mcg (1,000 mcg Oral Given 8/17/24 1001)   furosemide tablet 20 mg (0 mg Oral Hold 8/17/24 1001)   pantoprazole EC tablet 40 mg (40 mg Oral Given 8/17/24 1001)   potassium chloride SA CR tablet 10 mEq (10 mEq Oral Given 8/16/24 2243)   levothyroxine tablet 137 mcg (137 mcg Oral Given 8/17/24 1001)   sodium chloride 0.9% bolus 500 mL 500 mL (0 mLs Intravenous Stopped 8/16/24 1622)     Medical Decision Making  Differential diagnosis; anemia of chronic disease, GI bleed, electrolyte abnormality, ACS, dysrhythmia    We will recheck the patient's vital signs, hemoglobin low we will transfuse, obtain a rectal exam, reassess.    Hemoglobin check confirmed to be 5.8.  Patient borderline hypotensive in the ER in no distress.  Rectal exam no gross blood and Hemoccult negative.  Patient with a past history of macrocytic anemia unclear if this nutritionally related or pronation with anemia of chronic disease.  We will transfuse 2 units given patient is symptomatic and mildly hypotensive plan for admission to the hospital for further treatment and evaluation.    Amount and/or Complexity of Data Reviewed  Labs: ordered.    Risk  Prescription drug management.                                      Clinical Impression:  Final diagnoses:  [R53.1] Weakness  [D64.9] Symptomatic anemia (Primary)          ED Disposition Condition    Observation Stable                Edward Troncoso MD  08/17/24 1010

## 2024-08-16 NOTE — TELEPHONE ENCOUNTER
Spoke with pt. Pt reports going to the ER this AM due to abnormal test results. Verbalized understanding

## 2024-08-17 VITALS
SYSTOLIC BLOOD PRESSURE: 98 MMHG | TEMPERATURE: 98 F | BODY MASS INDEX: 30.46 KG/M2 | RESPIRATION RATE: 18 BRPM | HEIGHT: 63 IN | OXYGEN SATURATION: 99 % | HEART RATE: 99 BPM | WEIGHT: 171.94 LBS | DIASTOLIC BLOOD PRESSURE: 59 MMHG

## 2024-08-17 LAB
BASOPHILS # BLD AUTO: 0.05 K/UL (ref 0–0.2)
BASOPHILS NFR BLD: 1.1 % (ref 0–1.9)
DIFFERENTIAL METHOD BLD: ABNORMAL
EOSINOPHIL # BLD AUTO: 0.2 K/UL (ref 0–0.5)
EOSINOPHIL NFR BLD: 3.9 % (ref 0–8)
ERYTHROCYTE [DISTWIDTH] IN BLOOD BY AUTOMATED COUNT: 22.9 % (ref 11.5–14.5)
FERRITIN SERPL-MCNC: 601 NG/ML (ref 20–300)
FOLATE SERPL-MCNC: >40 NG/ML (ref 4–24)
HCT VFR BLD AUTO: 22.6 % (ref 37–48.5)
HCV AB SERPL QL IA: NORMAL
HGB BLD-MCNC: 7.6 G/DL (ref 12–16)
HIV 1+2 AB+HIV1 P24 AG SERPL QL IA: NORMAL
IMM GRANULOCYTES # BLD AUTO: 0.04 K/UL (ref 0–0.04)
IMM GRANULOCYTES NFR BLD AUTO: 0.9 % (ref 0–0.5)
LYMPHOCYTES # BLD AUTO: 0.8 K/UL (ref 1–4.8)
LYMPHOCYTES NFR BLD: 17.4 % (ref 18–48)
MCH RBC QN AUTO: 35 PG (ref 27–31)
MCHC RBC AUTO-ENTMCNC: 33.6 G/DL (ref 32–36)
MCV RBC AUTO: 104 FL (ref 82–98)
MONOCYTES # BLD AUTO: 0.5 K/UL (ref 0.3–1)
MONOCYTES NFR BLD: 9.8 % (ref 4–15)
NEUTROPHILS # BLD AUTO: 3.1 K/UL (ref 1.8–7.7)
NEUTROPHILS NFR BLD: 66.9 % (ref 38–73)
NRBC BLD-RTO: 0 /100 WBC
PLATELET # BLD AUTO: 95 K/UL (ref 150–450)
PMV BLD AUTO: 9.2 FL (ref 9.2–12.9)
RBC # BLD AUTO: 2.17 M/UL (ref 4–5.4)
VIT B12 SERPL-MCNC: 757 PG/ML (ref 210–950)
WBC # BLD AUTO: 4.59 K/UL (ref 3.9–12.7)

## 2024-08-17 PROCEDURE — 25000003 PHARM REV CODE 250: Performed by: STUDENT IN AN ORGANIZED HEALTH CARE EDUCATION/TRAINING PROGRAM

## 2024-08-17 PROCEDURE — G0378 HOSPITAL OBSERVATION PER HR: HCPCS

## 2024-08-17 PROCEDURE — 85025 COMPLETE CBC W/AUTO DIFF WBC: CPT | Performed by: STUDENT IN AN ORGANIZED HEALTH CARE EDUCATION/TRAINING PROGRAM

## 2024-08-17 PROCEDURE — 94761 N-INVAS EAR/PLS OXIMETRY MLT: CPT

## 2024-08-17 RX ORDER — METOPROLOL SUCCINATE 50 MG/1
25 TABLET, EXTENDED RELEASE ORAL DAILY
Start: 2024-08-17

## 2024-08-17 RX ADMIN — PANTOPRAZOLE SODIUM 40 MG: 40 TABLET, DELAYED RELEASE ORAL at 10:08

## 2024-08-17 RX ADMIN — ASPIRIN 81 MG: 81 TABLET, COATED ORAL at 10:08

## 2024-08-17 RX ADMIN — LEVOTHYROXINE SODIUM 137 MCG: 25 TABLET ORAL at 10:08

## 2024-08-17 RX ADMIN — POTASSIUM BICARBONATE 40 MEQ: 782 TABLET, EFFERVESCENT ORAL at 12:08

## 2024-08-17 RX ADMIN — FOLIC ACID 1000 MCG: 1 TABLET ORAL at 10:08

## 2024-08-17 NOTE — AI DETERIORATION ALERT
Artificial Intelligence Notification  [address] [place] [address]  Phone: [phone]    This documentation was triggered by an Artificial Intelligence Notification:    Admit Date: 2024   LOS: 0  Code Status: Full Code  : 1955  Age: 68 y.o.  Weight:   Wt Readings from Last 1 Encounters:   24 78 kg (171 lb 15.3 oz)        Sex: female  Bed: 10/10A  MRN: [id number]  Attending Physician: Rico Castro DO     Date of Alert: 2024  Time AI Alert Received:  203            Vitals:    24 0200   BP:    Pulse: 92   Resp: (!) 30   Temp:      SpO2: 96 %      Artificial Intelligence alert discussed with Provider:     Name:  eliud   Date/Time of Provider Notification:       Patient Condition:  Pt receiving blood transfusion nurse at bedside pt awake alert resp rate reevaluated to be normal md aguirre

## 2024-08-17 NOTE — DISCHARGE SUMMARY
Hospitalist Discharge Note    Attending Physician  BAL MEHTA DO      Admit Date  8/16/2024    Discharge Date  08/17/2024    Discharge Diagnosis  Patient Active Problem List   Diagnosis    Acquired hypothyroidism    A-fib    Macrocytic anemia    Anxiety    Ataxic gait    Bilateral carpal tunnel syndrome    Stage 3b chronic kidney disease    Essential hypertension    Gastroesophageal reflux disease without esophagitis    Hypokalemia    Hypomagnesemia    Menopause present    Parkinson's disease    Thrombocytopenia    Arthritis    Vitamin D deficiency    Aortic atherosclerosis    Symptomatic anemia    Bedbound    Weakness         Consultants  N/A    Pertinent Diagnostic Studies and Procedures    CBC:  Recent Labs   Lab 08/17/24  0407   WBC 4.59   HGB 7.6*   HCT 22.6*   PLT 95*     CMP:  Recent Labs   Lab 08/16/24  1547   CALCIUM 8.4*   *   K 3.4*   CO2 18*   CL 99   BUN 33*   CREATININE 2.2*         Microbiology Results (last 7 days)       ** No results found for the last 168 hours. **            Imaging Results    None             Hospital Course    Per admission HPI:   Jennifer Hurst is a 68 y.o. bed-bound female with Parkinson's disease, chronic megaloblastic anemia and thrombocytopenia who presented to the emergency department after being told by home health that her hemoglobin was critically low.  At presentation her hemoglobin is 5.8.  Associated complaints include generalized weakness and worsening fatigue as of late.  She denies any amaury bleeding, dark stools, or weight loss.  Her FOBT is negative per ED physician.  She does have a longstanding history of chronic anemia and has undergone extensive workup for this in the past including upper and lower endoscopies though the last was a few years ago.  She last received a blood transfusion around 6 months ago at [facility] at which time a bone marrow biopsy was carried out but she says that she can not get the results unless she sees   Jorge with hematology oncology in clinic.  She has been unable to do so due to transportation issues.  She lives at home with her elderly spouse who cares for her and would not be interested in long-term care facility.  She will be admitted to the medicine service for further evaluation and management.       Mrs. Susan Hurst was admitted to the medicine service where she underwent evaluation and management of symptomatic macrocytic anemia of uncertain etiology.  She was given to you PRBC with appropriate response with hemoglobin 7.6 this morning.  She recently underwent bone marrow biopsy at [facility].  She was advised to call the number on her Medicare card and arranged for transport to see Hematology Oncology Clinic.  She needs to make an appointment with Dr. Patel to get her biopsy results as well as establish care.  She can be given blood transfusions on an outpatient basis in the infusion clinic there there facility rather than have to be admitted to the hospital repeatedly.  She was dropped by her home health company because she we will not follow up with her primary care provider or Hematology Oncology.  We have arranged for another company prior to discharge.  She was advised to take half of her metoprolol dosage now 25 mg of succinate daily due to borderline hypotension.  Patient expressed understanding of and agreement with this plan of discharge.      Discharge Condition:  Stable    Discharge Disposition:  Discharged to home with home health    Discharge Medications:     Medication List        CHANGE how you take these medications      metoprolol succinate 50 MG 24 hr tablet  Commonly known as: TOPROL XL  Take 0.5 tablets (25 mg total) by mouth once daily.  What changed: how much to take            CONTINUE taking these medications      aspirin 81 MG EC tablet  Commonly known as: ECOTRIN     folic acid 1 MG tablet  Commonly known as: FOLVITE  Take 1 tablet (1,000 mcg total) by mouth once daily.      furosemide 20 MG tablet  Commonly known as: LASIX  Take 1 tablet (20 mg total) by mouth 2 (two) times a day.     levothyroxine 137 MCG Tab tablet  Commonly known as: SYNTHROID  Take 1 tablet (137 mcg total) by mouth before breakfast.     pantoprazole 40 MG tablet  Commonly known as: PROTONIX  Take 1 tablet (40 mg total) by mouth 2 (two) times daily.     potassium chloride 10 MEQ Tbsr  Commonly known as: KLOR-CON               Where to Get Your Medications        Information about where to get these medications is not yet available    Ask your nurse or doctor about these medications  metoprolol succinate 50 MG 24 hr tablet             Discharge Instructions    Patient was instructed, no driving while taking any sedating or mind-altering medications including but not limited to opiates, benzos, psych medications, antiemetics, antihistamines, headache medications, and anti-vertigo medications.      Instructed to notify a physician if they begin to experience any chest pain, palpitations, racing heart, shortness of breath, altered mentation, new focal neurologic findings, or any other signs/symptoms that the patient/family find concerning.        Follow Up  Primary care   Hematology oncology      Time Spent on Discharge  It took me 33 minutes to discharge this patient from the hospital.  This time was taken coordinating their care for discharge, counseling them on their discharge instructions, reconciling their medications, and finally discharging them from the hospital.

## 2024-08-17 NOTE — PLAN OF CARE
Problem: Adult Inpatient Plan of Care  Goal: Plan of Care Review  Outcome: Met  Goal: Patient-Specific Goal (Individualized)  Outcome: Met  Goal: Absence of Hospital-Acquired Illness or Injury  Outcome: Met  Goal: Optimal Comfort and Wellbeing  Outcome: Met  Goal: Readiness for Transition of Care  Outcome: Met     Problem: Skin Injury Risk Increased  Goal: Skin Health and Integrity  Outcome: Met     Problem: Anemia  Goal: Anemia Symptom Improvement  Outcome: Met     Problem: Fall Injury Risk  Goal: Absence of Fall and Fall-Related Injury  Outcome: Met     Problem: Fatigue  Goal: Improved Activity Tolerance  Outcome: Met

## 2024-08-17 NOTE — NURSING
2nd unit of blood complete. Vitals obtained. Bedbath complete. CHG wipes used. Pt linens bagged in personal belongings bag.

## 2024-08-19 NOTE — PLAN OF CARE
Referrals sent to multiple home health agencies as [facility] can't take back as she is noncompliant as won't go see her doctors. Dr Kathleen told them she would not sign home health paperwork as she really needs to go to hematology to see why she is still anemic. Dr Castro orders labs every other week for home health. Spoke to patient who states [facility] called & will see her tomorrow. Spoke to them & they plan on going out tomorrow. They are aware of the orders for labs & Dr Kathleen not sending home health orders. They will also put her in palliative care & their NP can sign the orders. No other needs at this time.

## 2024-08-20 ENCOUNTER — PATIENT OUTREACH (OUTPATIENT)
Dept: ADMINISTRATIVE | Facility: CLINIC | Age: 69
End: 2024-08-20
Payer: MEDICARE

## 2024-08-20 NOTE — PROGRESS NOTES
[facility]3 nurse spoke with Jennifer Hurst for a TCC post hospital discharge follow up call. The patient requested a virtual only appt for HOSP; Routed message to Joleen Kathleen MD; unable to schedule virtual

## 2024-08-26 ENCOUNTER — OFFICE VISIT (OUTPATIENT)
Dept: FAMILY MEDICINE | Facility: CLINIC | Age: 69
End: 2024-08-26
Payer: MEDICARE

## 2024-08-26 VITALS — SYSTOLIC BLOOD PRESSURE: 100 MMHG | DIASTOLIC BLOOD PRESSURE: 60 MMHG

## 2024-08-26 DIAGNOSIS — I10 ESSENTIAL HYPERTENSION: ICD-10-CM

## 2024-08-26 DIAGNOSIS — D64.9 SYMPTOMATIC ANEMIA: Primary | ICD-10-CM

## 2024-08-26 DIAGNOSIS — N18.4 CHRONIC KIDNEY DISEASE (CKD), STAGE IV (SEVERE): ICD-10-CM

## 2024-08-26 PROCEDURE — 99213 OFFICE O/P EST LOW 20 MIN: CPT | Mod: 95,,, | Performed by: FAMILY MEDICINE

## 2024-08-26 NOTE — ASSESSMENT & PLAN NOTE
BMP  Lab Results   Component Value Date     (L) 08/16/2024    K 3.4 (L) 08/16/2024    CL 99 08/16/2024    CO2 18 (L) 08/16/2024    BUN 33 (H) 08/16/2024    CREATININE 2.2 (H) 08/16/2024    CALCIUM 8.4 (L) 08/16/2024    ANIONGAP 15 08/16/2024    EGFRNORACEVR 23.5 (A) 08/16/2024     Greatly reduced GFR on most recent labwork. She does not come into the office for bloodwork.

## 2024-08-26 NOTE — PROGRESS NOTES
The patient location is: [place]  The chief complaint leading to consultation is: Anemia    Visit type: audiovisual    Face to Face time with patient: 8 minutes  10 minutes of total time spent on the encounter, which includes face to face time and non-face to face time preparing to see the patient (eg, review of tests), Obtaining and/or reviewing separately obtained history, Documenting clinical information in the electronic or other health record, Independently interpreting results (not separately reported) and communicating results to the patient/family/caregiver, or Care coordination (not separately reported).         Each patient to whom he or she provides medical services by telemedicine is:  (1) informed of the relationship between the physician and patient and the respective role of any other health care provider with respect to management of the patient; and (2) notified that he or she may decline to receive medical services by telemedicine and may withdraw from such care at any time.    Notes:  Subjective:       Patient ID: Jennifer Hurst is a 68 y.o. female.    Chief Complaint: No chief complaint on file.      Ms. Susan Hurst presents today for follow up.   She tells me that she is going to follow up with the hematologist.   She tells me that she is waiting on her  to be able to put a shoe on her foot.   She has been using an uber  who is moving to [place]. So she is going to fine someone else.     She is going to see them.       Review of Systems   Constitutional:  Negative for activity change and unexpected weight change.   HENT:  Negative for hearing loss, rhinorrhea and trouble swallowing.    Eyes:  Negative for discharge and visual disturbance.   Respiratory:  Negative for chest tightness and wheezing.    Cardiovascular:  Positive for palpitations. Negative for chest pain.   Gastrointestinal:  Negative for blood in stool, constipation, diarrhea and vomiting.   Endocrine:  Negative for polydipsia and polyuria.   Genitourinary:  Negative for difficulty urinating, dysuria, hematuria and menstrual problem.   Musculoskeletal:  Negative for arthralgias, joint swelling and neck pain.   Neurological:  Negative for weakness and headaches.   Psychiatric/Behavioral:  Negative for confusion and dysphoric mood.          Objective:      Physical Exam  Vitals and nursing note reviewed.   Constitutional:       General: She is not in acute distress.     Appearance: She is not ill-appearing.   Cardiovascular:      Rate and Rhythm: Normal rate and regular rhythm.      Heart sounds: No murmur heard.  Pulmonary:      Effort: Pulmonary effort is normal.      Breath sounds: Normal breath sounds. No wheezing.   Skin:     General: Skin is warm and dry.      Findings: No rash.   Neurological:      Mental Status: She is alert.   Psychiatric:         Mood and Affect: Mood normal.         Behavior: Behavior normal.         Assessment:       1. Symptomatic anemia    2. Essential hypertension    3. Chronic kidney disease (CKD), stage IV (severe)        Plan:       Problem List Items Addressed This Visit          Cardiac/Vascular    Essential hypertension     Blood pressure remains low- No evidence of hypertension at this time.             Renal/    Chronic kidney disease (CKD), stage IV (severe)     BMP  Lab Results   Component Value Date     (L) 08/16/2024    K 3.4 (L) 08/16/2024    CL 99 08/16/2024    CO2 18 (L) 08/16/2024    BUN 33 (H) 08/16/2024    CREATININE 2.2 (H) 08/16/2024    CALCIUM 8.4 (L) 08/16/2024    ANIONGAP 15 08/16/2024    EGFRNORACEVR 23.5 (A) 08/16/2024     Greatly reduced GFR on most recent labwork. She does not come into the office for bloodwork.             Oncology    Symptomatic anemia - Primary     Chronically low. She has not had this worked up. She feels better since her recent blood transfusion. Has not had a bone marrow biopsy. She wants  for blood draws, but we discussed  that this is not a reason for them to see her regularly. She reports that she will go to the ED if she needs a blood draw.              Discussed that home health is no longer going to be able to go out to her home because she has not been getting any work up for this condition and is not improving.

## 2024-08-26 NOTE — ASSESSMENT & PLAN NOTE
Chronically low. She has not had this worked up. She feels better since her recent blood transfusion. Has not had a bone marrow biopsy. She wants  for blood draws, but we discussed that this is not a reason for them to see her regularly. She reports that she will go to the ED if she needs a blood draw.

## 2024-08-28 ENCOUNTER — TELEPHONE (OUTPATIENT)
Dept: FAMILY MEDICINE | Facility: CLINIC | Age: 69
End: 2024-08-28
Payer: MEDICARE

## 2024-08-28 NOTE — TELEPHONE ENCOUNTER
Deepti with Home Health   Heart Rate 130  Blood pressure 108/70  Asymptomatic  Medication taken around 1044  MD notified

## 2024-08-28 NOTE — TELEPHONE ENCOUNTER
----- Message from Brent Gotti sent at 8/28/2024 12:40 PM CDT -----  Type: Needs Medical Advice    Who Called:  Deepti From [facility]   Best Call Back Number: [phone]    Additional Information: Deepti From [facility] calling to report Pt's heart rate in in the 130's Please call back to advise, Thanks!

## 2024-09-05 ENCOUNTER — DOCUMENT SCAN (OUTPATIENT)
Dept: HOME HEALTH SERVICES | Facility: HOSPITAL | Age: 69
End: 2024-09-05
Payer: MEDICARE

## 2024-09-16 ENCOUNTER — HOSPITAL ENCOUNTER (INPATIENT)
Facility: HOSPITAL | Age: 69
LOS: 11 days | Discharge: HOSPICE | DRG: 853 | End: 2024-09-27
Attending: EMERGENCY MEDICINE | Admitting: FAMILY MEDICINE
Payer: MEDICARE

## 2024-09-16 DIAGNOSIS — Z78.0 MENOPAUSE PRESENT: ICD-10-CM

## 2024-09-16 DIAGNOSIS — M19.90 ARTHRITIS: ICD-10-CM

## 2024-09-16 DIAGNOSIS — D69.6 THROMBOCYTOPENIA: ICD-10-CM

## 2024-09-16 DIAGNOSIS — E87.20 METABOLIC ACIDOSIS: ICD-10-CM

## 2024-09-16 DIAGNOSIS — A41.9 SEPTIC SHOCK: ICD-10-CM

## 2024-09-16 DIAGNOSIS — N18.32 STAGE 3B CHRONIC KIDNEY DISEASE: ICD-10-CM

## 2024-09-16 DIAGNOSIS — I48.91 ATRIAL FIBRILLATION WITH RVR: ICD-10-CM

## 2024-09-16 DIAGNOSIS — N18.9 ACUTE KIDNEY INJURY SUPERIMPOSED ON CHRONIC KIDNEY DISEASE: ICD-10-CM

## 2024-09-16 DIAGNOSIS — I10 ESSENTIAL HYPERTENSION: ICD-10-CM

## 2024-09-16 DIAGNOSIS — N18.4 CKD (CHRONIC KIDNEY DISEASE), STAGE IV: ICD-10-CM

## 2024-09-16 DIAGNOSIS — E87.6 HYPOKALEMIA: ICD-10-CM

## 2024-09-16 DIAGNOSIS — E83.42 HYPOMAGNESEMIA: ICD-10-CM

## 2024-09-16 DIAGNOSIS — R53.1 WEAKNESS: ICD-10-CM

## 2024-09-16 DIAGNOSIS — D64.9 SYMPTOMATIC ANEMIA: ICD-10-CM

## 2024-09-16 DIAGNOSIS — E55.9 VITAMIN D DEFICIENCY: ICD-10-CM

## 2024-09-16 DIAGNOSIS — I49.9 CARDIAC ARRHYTHMIA: ICD-10-CM

## 2024-09-16 DIAGNOSIS — R26.0 ATAXIC GAIT: ICD-10-CM

## 2024-09-16 DIAGNOSIS — R07.9 CHEST PAIN: ICD-10-CM

## 2024-09-16 DIAGNOSIS — E88.09 HYPOALBUMINEMIA: ICD-10-CM

## 2024-09-16 DIAGNOSIS — L89.153 PRESSURE INJURY OF SACRAL REGION, STAGE 3: ICD-10-CM

## 2024-09-16 DIAGNOSIS — K21.9 GASTROESOPHAGEAL REFLUX DISEASE WITHOUT ESOPHAGITIS: ICD-10-CM

## 2024-09-16 DIAGNOSIS — G56.03 BILATERAL CARPAL TUNNEL SYNDROME: ICD-10-CM

## 2024-09-16 DIAGNOSIS — K92.2 GI BLEED: ICD-10-CM

## 2024-09-16 DIAGNOSIS — Z74.01 BEDBOUND: ICD-10-CM

## 2024-09-16 DIAGNOSIS — Z71.89 ADVANCED CARE PLANNING/COUNSELING DISCUSSION: ICD-10-CM

## 2024-09-16 DIAGNOSIS — E03.9 HYPOTHYROIDISM, UNSPECIFIED TYPE: ICD-10-CM

## 2024-09-16 DIAGNOSIS — I70.0 AORTIC ATHEROSCLEROSIS: ICD-10-CM

## 2024-09-16 DIAGNOSIS — I50.21 ACUTE HFREF (HEART FAILURE WITH REDUCED EJECTION FRACTION): Primary | ICD-10-CM

## 2024-09-16 DIAGNOSIS — R65.21 SEPTIC SHOCK: ICD-10-CM

## 2024-09-16 DIAGNOSIS — F41.9 ANXIETY: ICD-10-CM

## 2024-09-16 DIAGNOSIS — N17.9 ACUTE KIDNEY INJURY SUPERIMPOSED ON CHRONIC KIDNEY DISEASE: ICD-10-CM

## 2024-09-16 DIAGNOSIS — L89.153 SACRAL DECUBITUS ULCER, STAGE III: Chronic | ICD-10-CM

## 2024-09-16 DIAGNOSIS — D53.9 MACROCYTIC ANEMIA: ICD-10-CM

## 2024-09-16 LAB
ABO + RH BLD: NORMAL
ALBUMIN SERPL BCP-MCNC: 2.1 G/DL (ref 3.5–5.2)
ALP SERPL-CCNC: 55 U/L (ref 55–135)
ALT SERPL W/O P-5'-P-CCNC: 17 U/L (ref 10–44)
ANION GAP SERPL CALC-SCNC: 15 MMOL/L (ref 8–16)
AST SERPL-CCNC: 44 U/L (ref 10–40)
BASOPHILS NFR BLD: 0 % (ref 0–1.9)
BILIRUB SERPL-MCNC: 3.7 MG/DL (ref 0.1–1)
BLD GP AB SCN CELLS X3 SERPL QL: NORMAL
BLD PROD TYP BPU: NORMAL
BLOOD UNIT EXPIRATION DATE: NORMAL
BLOOD UNIT TYPE CODE: 600
BLOOD UNIT TYPE: NORMAL
BUN SERPL-MCNC: 39 MG/DL (ref 8–23)
CALCIUM SERPL-MCNC: 7.2 MG/DL (ref 8.7–10.5)
CHLORIDE SERPL-SCNC: 105 MMOL/L (ref 95–110)
CO2 SERPL-SCNC: 16 MMOL/L (ref 23–29)
CODING SYSTEM: NORMAL
CREAT SERPL-MCNC: 2.4 MG/DL (ref 0.5–1.4)
CROSSMATCH INTERPRETATION: NORMAL
DIFFERENTIAL METHOD BLD: ABNORMAL
DISPENSE STATUS: NORMAL
EOSINOPHIL NFR BLD: 0 % (ref 0–8)
ERYTHROCYTE [DISTWIDTH] IN BLOOD BY AUTOMATED COUNT: 23.5 % (ref 11.5–14.5)
EST. GFR  (NO RACE VARIABLE): 21.1 ML/MIN/1.73 M^2
GLUCOSE SERPL-MCNC: 109 MG/DL (ref 70–110)
HCT VFR BLD AUTO: 17.7 % (ref 37–48.5)
HGB BLD-MCNC: 5.9 G/DL (ref 12–16)
IMM GRANULOCYTES # BLD AUTO: ABNORMAL K/UL (ref 0–0.04)
IMM GRANULOCYTES NFR BLD AUTO: ABNORMAL % (ref 0–0.5)
IRON SERPL-MCNC: 60 UG/DL (ref 30–160)
LYMPHOCYTES NFR BLD: 13 % (ref 18–48)
MAGNESIUM SERPL-MCNC: 1.3 MG/DL (ref 1.6–2.6)
MCH RBC QN AUTO: 36.9 PG (ref 27–31)
MCHC RBC AUTO-ENTMCNC: 33.3 G/DL (ref 32–36)
MCV RBC AUTO: 111 FL (ref 82–98)
METAMYELOCYTES NFR BLD MANUAL: 3 %
MONOCYTES NFR BLD: 6 % (ref 4–15)
NEUTROPHILS NFR BLD: 66 % (ref 38–73)
NEUTS BAND NFR BLD MANUAL: 12 %
NRBC BLD-RTO: 1 /100 WBC
NUM UNITS TRANS PACKED RBC: NORMAL
OB PNL STL: NEGATIVE
PLATELET # BLD AUTO: 173 K/UL (ref 150–450)
PLATELET BLD QL SMEAR: ABNORMAL
PMV BLD AUTO: 10.6 FL (ref 9.2–12.9)
POTASSIUM SERPL-SCNC: 2.8 MMOL/L (ref 3.5–5.1)
PROT SERPL-MCNC: 5.9 G/DL (ref 6–8.4)
RBC # BLD AUTO: 1.6 M/UL (ref 4–5.4)
SATURATED IRON: 56 % (ref 20–50)
SODIUM SERPL-SCNC: 136 MMOL/L (ref 136–145)
SPECIMEN OUTDATE: NORMAL
TOTAL IRON BINDING CAPACITY: 108 UG/DL (ref 250–450)
TRANSFERRIN SERPL-MCNC: 73 MG/DL (ref 200–375)
WBC # BLD AUTO: 16.18 K/UL (ref 3.9–12.7)

## 2024-09-16 PROCEDURE — 96366 THER/PROPH/DIAG IV INF ADDON: CPT

## 2024-09-16 PROCEDURE — 96374 THER/PROPH/DIAG INJ IV PUSH: CPT | Mod: 59

## 2024-09-16 PROCEDURE — 99285 EMERGENCY DEPT VISIT HI MDM: CPT | Mod: 25

## 2024-09-16 PROCEDURE — 25000003 PHARM REV CODE 250: Performed by: EMERGENCY MEDICINE

## 2024-09-16 PROCEDURE — 80053 COMPREHEN METABOLIC PANEL: CPT | Performed by: EMERGENCY MEDICINE

## 2024-09-16 PROCEDURE — 71045 X-RAY EXAM CHEST 1 VIEW: CPT | Mod: TC

## 2024-09-16 PROCEDURE — 86850 RBC ANTIBODY SCREEN: CPT | Performed by: EMERGENCY MEDICINE

## 2024-09-16 PROCEDURE — 86920 COMPATIBILITY TEST SPIN: CPT | Performed by: EMERGENCY MEDICINE

## 2024-09-16 PROCEDURE — P9016 RBC LEUKOCYTES REDUCED: HCPCS | Performed by: EMERGENCY MEDICINE

## 2024-09-16 PROCEDURE — 27000221 HC OXYGEN, UP TO 24 HOURS

## 2024-09-16 PROCEDURE — 96360 HYDRATION IV INFUSION INIT: CPT | Mod: 59

## 2024-09-16 PROCEDURE — 96375 TX/PRO/DX INJ NEW DRUG ADDON: CPT

## 2024-09-16 PROCEDURE — 83735 ASSAY OF MAGNESIUM: CPT | Performed by: EMERGENCY MEDICINE

## 2024-09-16 PROCEDURE — 94761 N-INVAS EAR/PLS OXIMETRY MLT: CPT

## 2024-09-16 PROCEDURE — 84466 ASSAY OF TRANSFERRIN: CPT | Performed by: EMERGENCY MEDICINE

## 2024-09-16 PROCEDURE — 96365 THER/PROPH/DIAG IV INF INIT: CPT

## 2024-09-16 PROCEDURE — 36430 TRANSFUSION BLD/BLD COMPNT: CPT

## 2024-09-16 PROCEDURE — 85027 COMPLETE CBC AUTOMATED: CPT | Performed by: EMERGENCY MEDICINE

## 2024-09-16 PROCEDURE — 63600175 PHARM REV CODE 636 W HCPCS: Performed by: EMERGENCY MEDICINE

## 2024-09-16 PROCEDURE — 82272 OCCULT BLD FECES 1-3 TESTS: CPT | Performed by: EMERGENCY MEDICINE

## 2024-09-16 PROCEDURE — 93005 ELECTROCARDIOGRAM TRACING: CPT

## 2024-09-16 PROCEDURE — 71045 X-RAY EXAM CHEST 1 VIEW: CPT | Mod: 26,,, | Performed by: RADIOLOGY

## 2024-09-16 PROCEDURE — 36415 COLL VENOUS BLD VENIPUNCTURE: CPT | Performed by: EMERGENCY MEDICINE

## 2024-09-16 PROCEDURE — 93010 ELECTROCARDIOGRAM REPORT: CPT | Mod: ,,, | Performed by: INTERNAL MEDICINE

## 2024-09-16 PROCEDURE — 86900 BLOOD TYPING SEROLOGIC ABO: CPT | Performed by: EMERGENCY MEDICINE

## 2024-09-16 PROCEDURE — 86901 BLOOD TYPING SEROLOGIC RH(D): CPT | Performed by: EMERGENCY MEDICINE

## 2024-09-16 PROCEDURE — 96361 HYDRATE IV INFUSION ADD-ON: CPT

## 2024-09-16 PROCEDURE — 12000002 HC ACUTE/MED SURGE SEMI-PRIVATE ROOM

## 2024-09-16 PROCEDURE — 85007 BL SMEAR W/DIFF WBC COUNT: CPT | Performed by: EMERGENCY MEDICINE

## 2024-09-16 PROCEDURE — 30233N1 TRANSFUSION OF NONAUTOLOGOUS RED BLOOD CELLS INTO PERIPHERAL VEIN, PERCUTANEOUS APPROACH: ICD-10-PCS | Performed by: INTERNAL MEDICINE

## 2024-09-16 RX ORDER — FOLIC ACID 1 MG/1
1000 TABLET ORAL DAILY
Status: DISCONTINUED | OUTPATIENT
Start: 2024-09-17 | End: 2024-09-27 | Stop reason: HOSPADM

## 2024-09-16 RX ORDER — METOPROLOL SUCCINATE 25 MG/1
25 TABLET, EXTENDED RELEASE ORAL DAILY
Status: DISCONTINUED | OUTPATIENT
Start: 2024-09-17 | End: 2024-09-17

## 2024-09-16 RX ORDER — AMIODARONE HYDROCHLORIDE 150 MG/3ML
150 INJECTION, SOLUTION INTRAVENOUS
Status: COMPLETED | OUTPATIENT
Start: 2024-09-16 | End: 2024-09-16

## 2024-09-16 RX ORDER — HYDROCODONE BITARTRATE AND ACETAMINOPHEN 500; 5 MG/1; MG/1
TABLET ORAL
Status: DISCONTINUED | OUTPATIENT
Start: 2024-09-16 | End: 2024-09-19

## 2024-09-16 RX ORDER — MORPHINE SULFATE 2 MG/ML
2 INJECTION, SOLUTION INTRAMUSCULAR; INTRAVENOUS EVERY 4 HOURS PRN
Status: DISCONTINUED | OUTPATIENT
Start: 2024-09-17 | End: 2024-09-27 | Stop reason: HOSPADM

## 2024-09-16 RX ORDER — MAGNESIUM SULFATE HEPTAHYDRATE 40 MG/ML
2 INJECTION, SOLUTION INTRAVENOUS
Status: COMPLETED | OUTPATIENT
Start: 2024-09-16 | End: 2024-09-16

## 2024-09-16 RX ORDER — NOREPINEPHRINE BITARTRATE/D5W 4MG/250ML
0-3 PLASTIC BAG, INJECTION (ML) INTRAVENOUS CONTINUOUS
Status: DISCONTINUED | OUTPATIENT
Start: 2024-09-17 | End: 2024-09-22

## 2024-09-16 RX ORDER — POTASSIUM CHLORIDE 7.45 MG/ML
10 INJECTION INTRAVENOUS
Status: DISPENSED | OUTPATIENT
Start: 2024-09-17 | End: 2024-09-17

## 2024-09-16 RX ORDER — ACETAMINOPHEN 325 MG/1
650 TABLET ORAL EVERY 4 HOURS PRN
Status: DISCONTINUED | OUTPATIENT
Start: 2024-09-17 | End: 2024-09-27 | Stop reason: HOSPADM

## 2024-09-16 RX ORDER — DIGOXIN 0.25 MG/ML
250 INJECTION INTRAMUSCULAR; INTRAVENOUS
Status: DISCONTINUED | OUTPATIENT
Start: 2024-09-16 | End: 2024-09-16

## 2024-09-16 RX ORDER — NALOXONE HCL 0.4 MG/ML
0.02 VIAL (ML) INJECTION
Status: DISCONTINUED | OUTPATIENT
Start: 2024-09-17 | End: 2024-09-27 | Stop reason: HOSPADM

## 2024-09-16 RX ORDER — ONDANSETRON HYDROCHLORIDE 2 MG/ML
4 INJECTION, SOLUTION INTRAVENOUS EVERY 8 HOURS PRN
Status: DISCONTINUED | OUTPATIENT
Start: 2024-09-17 | End: 2024-09-27 | Stop reason: HOSPADM

## 2024-09-16 RX ORDER — DILTIAZEM HYDROCHLORIDE 5 MG/ML
0.25 INJECTION INTRAVENOUS
Status: ACTIVE | OUTPATIENT
Start: 2024-09-16 | End: 2024-09-17

## 2024-09-16 RX ORDER — SODIUM CHLORIDE 0.9 % (FLUSH) 0.9 %
10 SYRINGE (ML) INJECTION EVERY 12 HOURS PRN
Status: DISCONTINUED | OUTPATIENT
Start: 2024-09-17 | End: 2024-09-27 | Stop reason: HOSPADM

## 2024-09-16 RX ORDER — IBUPROFEN 200 MG
16 TABLET ORAL
Status: DISCONTINUED | OUTPATIENT
Start: 2024-09-17 | End: 2024-09-27 | Stop reason: HOSPADM

## 2024-09-16 RX ORDER — GLUCAGON 1 MG
1 KIT INJECTION
Status: DISCONTINUED | OUTPATIENT
Start: 2024-09-17 | End: 2024-09-27 | Stop reason: HOSPADM

## 2024-09-16 RX ORDER — IBUPROFEN 200 MG
24 TABLET ORAL
Status: DISCONTINUED | OUTPATIENT
Start: 2024-09-17 | End: 2024-09-27 | Stop reason: HOSPADM

## 2024-09-16 RX ORDER — POTASSIUM CHLORIDE 7.45 MG/ML
INJECTION INTRAVENOUS
Status: DISPENSED
Start: 2024-09-16 | End: 2024-09-17

## 2024-09-16 RX ORDER — PANTOPRAZOLE SODIUM 40 MG/1
40 TABLET, DELAYED RELEASE ORAL 2 TIMES DAILY
Status: DISCONTINUED | OUTPATIENT
Start: 2024-09-17 | End: 2024-09-27 | Stop reason: HOSPADM

## 2024-09-16 RX ORDER — PHENYLEPHRINE HCL IN 0.9% NACL 1 MG/10 ML
200 SYRINGE (ML) INTRAVENOUS
Status: COMPLETED | OUTPATIENT
Start: 2024-09-16 | End: 2024-09-16

## 2024-09-16 RX ORDER — POTASSIUM CHLORIDE 750 MG/1
10 TABLET, EXTENDED RELEASE ORAL 2 TIMES DAILY
Status: DISCONTINUED | OUTPATIENT
Start: 2024-09-17 | End: 2024-09-17

## 2024-09-16 RX ADMIN — AMIODARONE HYDROCHLORIDE 1 MG/MIN: 1.8 INJECTION, SOLUTION INTRAVENOUS at 08:09

## 2024-09-16 RX ADMIN — MAGNESIUM SULFATE HEPTAHYDRATE 2 G: 40 INJECTION, SOLUTION INTRAVENOUS at 08:09

## 2024-09-16 RX ADMIN — POTASSIUM CHLORIDE 10 MEQ: 7.46 INJECTION, SOLUTION INTRAVENOUS at 11:09

## 2024-09-16 RX ADMIN — Medication 200 MCG: at 06:09

## 2024-09-16 RX ADMIN — AMIODARONE HYDROCHLORIDE 150 MG: 50 INJECTION, SOLUTION INTRAVENOUS at 08:09

## 2024-09-16 RX ADMIN — NOREPINEPHRINE BITARTRATE 0.02 MCG/KG/MIN: 4 INJECTION, SOLUTION INTRAVENOUS at 11:09

## 2024-09-16 RX ADMIN — SODIUM CHLORIDE 1000 ML: 9 INJECTION, SOLUTION INTRAVENOUS at 09:09

## 2024-09-16 RX ADMIN — SODIUM CHLORIDE, POTASSIUM CHLORIDE, SODIUM LACTATE AND CALCIUM CHLORIDE 1000 ML: 600; 310; 30; 20 INJECTION, SOLUTION INTRAVENOUS at 06:09

## 2024-09-17 ENCOUNTER — ANESTHESIA EVENT (OUTPATIENT)
Facility: HOSPITAL | Age: 69
End: 2024-09-17
Payer: MEDICARE

## 2024-09-17 ENCOUNTER — ANESTHESIA (OUTPATIENT)
Facility: HOSPITAL | Age: 69
End: 2024-09-17
Payer: MEDICARE

## 2024-09-17 DIAGNOSIS — Z74.01 BEDBOUND: Primary | ICD-10-CM

## 2024-09-17 DIAGNOSIS — G20.A1 PARKINSON'S DISEASE, UNSPECIFIED WHETHER DYSKINESIA PRESENT, UNSPECIFIED WHETHER MANIFESTATIONS FLUCTUATE: ICD-10-CM

## 2024-09-17 PROBLEM — A41.9 SEPTIC SHOCK: Status: ACTIVE | Noted: 2024-09-17

## 2024-09-17 PROBLEM — E87.20 METABOLIC ACIDOSIS: Status: ACTIVE | Noted: 2024-09-17

## 2024-09-17 PROBLEM — Z71.89 ADVANCED CARE PLANNING/COUNSELING DISCUSSION: Status: ACTIVE | Noted: 2024-09-17

## 2024-09-17 PROBLEM — I48.91 A-FIB: Status: RESOLVED | Noted: 2021-09-28 | Resolved: 2024-09-17

## 2024-09-17 PROBLEM — R65.21 SEPTIC SHOCK: Status: ACTIVE | Noted: 2024-09-17

## 2024-09-17 LAB
ALBUMIN SERPL BCP-MCNC: 1.9 G/DL (ref 3.5–5.2)
ALP SERPL-CCNC: 50 U/L (ref 55–135)
ALT SERPL W/O P-5'-P-CCNC: 15 U/L (ref 10–44)
ANION GAP SERPL CALC-SCNC: 12 MMOL/L (ref 8–16)
ANION GAP SERPL CALC-SCNC: 13 MMOL/L (ref 8–16)
ANION GAP SERPL CALC-SCNC: 15 MMOL/L (ref 8–16)
ANISOCYTOSIS BLD QL SMEAR: SLIGHT
AORTIC ROOT ANNULUS: 2.51 CM
AORTIC VALVE CUSP SEPERATION: 1.65 CM
APICAL FOUR CHAMBER EJECTION FRACTION: 41 %
ASCENDING AORTA: 2.51 CM
AST SERPL-CCNC: 38 U/L (ref 10–40)
AV INDEX (PROSTH): 0.75
AV MEAN GRADIENT: 3 MMHG
AV PEAK GRADIENT: 4 MMHG
AV VALVE AREA BY VELOCITY RATIO: 1.28 CM²
AV VALVE AREA: 1.62 CM²
AV VELOCITY RATIO: 0.59
BACTERIA #/AREA URNS HPF: ABNORMAL /HPF
BASOPHILS NFR BLD: 0 % (ref 0–1.9)
BASOPHILS NFR BLD: 0 % (ref 0–1.9)
BILIRUB SERPL-MCNC: 2.8 MG/DL (ref 0.1–1)
BILIRUB UR QL STRIP: ABNORMAL
BNP SERPL-MCNC: 685 PG/ML (ref 0–99)
BSA FOR ECHO PROCEDURE: 1.88 M2
BUN SERPL-MCNC: 34 MG/DL (ref 8–23)
BUN SERPL-MCNC: 35 MG/DL (ref 8–23)
BUN SERPL-MCNC: 37 MG/DL (ref 8–23)
CALCIUM SERPL-MCNC: 6.9 MG/DL (ref 8.7–10.5)
CALCIUM SERPL-MCNC: 7.1 MG/DL (ref 8.7–10.5)
CALCIUM SERPL-MCNC: 7.1 MG/DL (ref 8.7–10.5)
CHLORIDE SERPL-SCNC: 105 MMOL/L (ref 95–110)
CHLORIDE SERPL-SCNC: 106 MMOL/L (ref 95–110)
CHLORIDE SERPL-SCNC: 106 MMOL/L (ref 95–110)
CLARITY UR: ABNORMAL
CO2 SERPL-SCNC: 15 MMOL/L (ref 23–29)
CO2 SERPL-SCNC: 16 MMOL/L (ref 23–29)
CO2 SERPL-SCNC: 16 MMOL/L (ref 23–29)
COLOR UR: YELLOW
CREAT SERPL-MCNC: 2.1 MG/DL (ref 0.5–1.4)
CREAT SERPL-MCNC: 2.1 MG/DL (ref 0.5–1.4)
CREAT SERPL-MCNC: 2.2 MG/DL (ref 0.5–1.4)
CV ECHO LV RWT: 0.52 CM
DIFFERENTIAL METHOD BLD: ABNORMAL
DIFFERENTIAL METHOD BLD: ABNORMAL
DOP CALC AO PEAK VEL: 1.03 M/S
DOP CALC AO VTI: 14.8 CM
DOP CALC LVOT AREA: 2.2 CM2
DOP CALC LVOT DIAMETER: 1.66 CM
DOP CALC LVOT PEAK VEL: 0.61 M/S
DOP CALC LVOT STROKE VOLUME: 24.01 CM3
DOP CALCLVOT PEAK VEL VTI: 11.1 CM
ECHO LV POSTERIOR WALL: 1.06 CM (ref 0.6–1.1)
EJECTION FRACTION: 40 %
EOSINOPHIL NFR BLD: 0 % (ref 0–8)
EOSINOPHIL NFR BLD: 1 % (ref 0–8)
ERYTHROCYTE [DISTWIDTH] IN BLOOD BY AUTOMATED COUNT: 26.1 % (ref 11.5–14.5)
ERYTHROCYTE [DISTWIDTH] IN BLOOD BY AUTOMATED COUNT: 26.4 % (ref 11.5–14.5)
EST. GFR  (NO RACE VARIABLE): 23.4 ML/MIN/1.73 M^2
EST. GFR  (NO RACE VARIABLE): 24.7 ML/MIN/1.73 M^2
EST. GFR  (NO RACE VARIABLE): 24.7 ML/MIN/1.73 M^2
ESTIMATED AVG GLUCOSE: 88 MG/DL (ref 68–131)
FRACTIONAL SHORTENING: 16 % (ref 28–44)
GLUCOSE SERPL-MCNC: 127 MG/DL (ref 70–110)
GLUCOSE SERPL-MCNC: 135 MG/DL (ref 70–110)
GLUCOSE SERPL-MCNC: 136 MG/DL (ref 70–110)
GLUCOSE UR QL STRIP: NEGATIVE
HBA1C MFR BLD: 4.7 % (ref 4–5.6)
HCT VFR BLD AUTO: 21.7 % (ref 37–48.5)
HCT VFR BLD AUTO: 23.2 % (ref 37–48.5)
HGB BLD-MCNC: 7.4 G/DL (ref 12–16)
HGB BLD-MCNC: 7.9 G/DL (ref 12–16)
HGB UR QL STRIP: ABNORMAL
HYALINE CASTS #/AREA URNS LPF: 0 /LPF
IMM GRANULOCYTES # BLD AUTO: ABNORMAL K/UL
IMM GRANULOCYTES # BLD AUTO: ABNORMAL K/UL (ref 0–0.04)
IMM GRANULOCYTES NFR BLD AUTO: ABNORMAL %
IMM GRANULOCYTES NFR BLD AUTO: ABNORMAL % (ref 0–0.5)
INTERVENTRICULAR SEPTUM: 1.15 CM (ref 0.6–1.1)
IVC DIAMETER: 2.18 CM
KETONES UR QL STRIP: NEGATIVE
LA MAJOR: 2.73 CM
LA MINOR: 2.47 CM
LACTATE SERPL-SCNC: 1.6 MMOL/L (ref 0.5–2.2)
LEFT ATRIUM AREA SYSTOLIC (APICAL 2 CHAMBER): 7.52 CM2
LEFT ATRIUM AREA SYSTOLIC (APICAL 4 CHAMBER): 8.88 CM2
LEFT ATRIUM SIZE: 4.55 CM
LEFT ATRIUM VOLUME INDEX MOD: 10.2 ML/M2
LEFT ATRIUM VOLUME MOD: 18.56 CM3
LEFT INTERNAL DIMENSION IN SYSTOLE: 3.44 CM (ref 2.1–4)
LEFT VENTRICLE DIASTOLIC VOLUME INDEX: 40.62 ML/M2
LEFT VENTRICLE DIASTOLIC VOLUME: 73.93 ML
LEFT VENTRICLE END DIASTOLIC VOLUME APICAL 4 CHAMBER: 37.39 ML
LEFT VENTRICLE END SYSTOLIC VOLUME APICAL 2 CHAMBER: 17.15 ML
LEFT VENTRICLE END SYSTOLIC VOLUME APICAL 4 CHAMBER: 19.5 ML
LEFT VENTRICLE MASS INDEX: 83 G/M2
LEFT VENTRICLE SYSTOLIC VOLUME INDEX: 26.7 ML/M2
LEFT VENTRICLE SYSTOLIC VOLUME: 48.67 ML
LEFT VENTRICULAR INTERNAL DIMENSION IN DIASTOLE: 4.09 CM (ref 3.5–6)
LEFT VENTRICULAR MASS: 151.72 G
LEUKOCYTE ESTERASE UR QL STRIP: ABNORMAL
LVED V (TEICH): 73.93 ML
LVES V (TEICH): 48.67 ML
LVOT MG: 0.83 MMHG
LVOT MV: 0.43 CM/S
LYMPHOCYTES NFR BLD: 16 % (ref 18–48)
LYMPHOCYTES NFR BLD: 16 % (ref 18–48)
MAGNESIUM SERPL-MCNC: 1.6 MG/DL (ref 1.6–2.6)
MAGNESIUM SERPL-MCNC: 2.1 MG/DL (ref 1.6–2.6)
MCH RBC QN AUTO: 34.4 PG (ref 27–31)
MCH RBC QN AUTO: 34.5 PG (ref 27–31)
MCHC RBC AUTO-ENTMCNC: 34.1 G/DL (ref 32–36)
MCHC RBC AUTO-ENTMCNC: 34.1 G/DL (ref 32–36)
MCV RBC AUTO: 101 FL (ref 82–98)
MCV RBC AUTO: 101 FL (ref 82–98)
METAMYELOCYTES NFR BLD MANUAL: 1 %
METAMYELOCYTES NFR BLD MANUAL: 2 %
MICROSCOPIC COMMENT: ABNORMAL
MONOCYTES NFR BLD: 3 % (ref 4–15)
MONOCYTES NFR BLD: 8 % (ref 4–15)
MYELOCYTES NFR BLD MANUAL: 1 %
NEUTROPHILS NFR BLD: 66 % (ref 38–73)
NEUTROPHILS NFR BLD: 67 % (ref 38–73)
NEUTS BAND NFR BLD MANUAL: 11 %
NEUTS BAND NFR BLD MANUAL: 8 %
NITRITE UR QL STRIP: NEGATIVE
NRBC BLD-RTO: 0 /100 WBC
NRBC BLD-RTO: 1 /100 WBC
OHS CV RV/LV RATIO: 0.7 CM
OHS QRS DURATION: 76 MS
OHS QTC CALCULATION: 516 MS
PH UR STRIP: 8 [PH] (ref 5–8)
PHOSPHATE SERPL-MCNC: 2.5 MG/DL (ref 2.7–4.5)
PHOSPHATE SERPL-MCNC: 2.6 MG/DL (ref 2.7–4.5)
PISA MRMAX VEL: 3.63 M/S
PISA TR MAX VEL: 2.24 M/S
PLATELET # BLD AUTO: 191 K/UL (ref 150–450)
PLATELET # BLD AUTO: 208 K/UL (ref 150–450)
PLATELET BLD QL SMEAR: ABNORMAL
PLATELET BLD QL SMEAR: ABNORMAL
PMV BLD AUTO: 10.4 FL (ref 9.2–12.9)
PMV BLD AUTO: 10.5 FL (ref 9.2–12.9)
POLYCHROMASIA BLD QL SMEAR: ABNORMAL
POTASSIUM SERPL-SCNC: 2.9 MMOL/L (ref 3.5–5.1)
POTASSIUM SERPL-SCNC: 3 MMOL/L (ref 3.5–5.1)
POTASSIUM SERPL-SCNC: 3.6 MMOL/L (ref 3.5–5.1)
PROCALCITONIN SERPL IA-MCNC: 0.87 NG/ML
PROT SERPL-MCNC: 5.2 G/DL (ref 6–8.4)
PROT UR QL STRIP: ABNORMAL
PV MV: 0.49 M/S
PV PEAK GRADIENT: 2 MMHG
PV PEAK VELOCITY: 0.65 M/S
RA MAJOR: 2.49 CM
RA PRESSURE ESTIMATED: 8 MMHG
RA WIDTH: 2.44 CM
RBC # BLD AUTO: 2.15 M/UL (ref 4–5.4)
RBC # BLD AUTO: 2.29 M/UL (ref 4–5.4)
RBC #/AREA URNS HPF: 4 /HPF (ref 0–4)
RIGHT VENTRICLE DIASTOLIC BASEL DIMENSION: 2.1 CM
RIGHT VENTRICLE DIASTOLIC LENGTH: 3.7 CM
RIGHT VENTRICLE DIASTOLIC MID DIMENSION: 2.2 CM
RIGHT VENTRICULAR END-DIASTOLIC DIMENSION: 2.88 CM
RIGHT VENTRICULAR LENGTH IN DIASTOLE (APICAL 4-CHAMBER VIEW): 3.71 CM
RV MID DIAMA: 2.18 CM
RV TB RVSP: 10 MMHG
SINUS: 2.25 CM
SODIUM SERPL-SCNC: 133 MMOL/L (ref 136–145)
SODIUM SERPL-SCNC: 135 MMOL/L (ref 136–145)
SODIUM SERPL-SCNC: 136 MMOL/L (ref 136–145)
SP GR UR STRIP: 1.02 (ref 1–1.03)
SQUAMOUS #/AREA URNS HPF: 2 /HPF
STJ: 2.26 CM
TR MAX PG: 20 MMHG
TRICUSPID ANNULAR PLANE SYSTOLIC EXCURSION: 1.4 CM
TROPONIN I SERPL DL<=0.01 NG/ML-MCNC: 0.01 NG/ML (ref 0–0.03)
TV REST PULMONARY ARTERY PRESSURE: 28 MMHG
URN SPEC COLLECT METH UR: ABNORMAL
UROBILINOGEN UR STRIP-ACNC: ABNORMAL EU/DL
WBC # BLD AUTO: 13.63 K/UL (ref 3.9–12.7)
WBC # BLD AUTO: 15.56 K/UL (ref 3.9–12.7)
WBC #/AREA URNS HPF: >100 /HPF (ref 0–5)
Z-SCORE OF LEFT VENTRICULAR DIMENSION IN END DIASTOLE: -2.09
Z-SCORE OF LEFT VENTRICULAR DIMENSION IN END SYSTOLE: 0.79

## 2024-09-17 PROCEDURE — 80053 COMPREHEN METABOLIC PANEL: CPT | Performed by: STUDENT IN AN ORGANIZED HEALTH CARE EDUCATION/TRAINING PROGRAM

## 2024-09-17 PROCEDURE — 36556 INSERT NON-TUNNEL CV CATH: CPT | Mod: ,,, | Performed by: SPECIALIST

## 2024-09-17 PROCEDURE — 25000003 PHARM REV CODE 250: Performed by: FAMILY MEDICINE

## 2024-09-17 PROCEDURE — 84145 PROCALCITONIN (PCT): CPT | Performed by: STUDENT IN AN ORGANIZED HEALTH CARE EDUCATION/TRAINING PROGRAM

## 2024-09-17 PROCEDURE — 87186 SC STD MICRODIL/AGAR DIL: CPT | Performed by: STUDENT IN AN ORGANIZED HEALTH CARE EDUCATION/TRAINING PROGRAM

## 2024-09-17 PROCEDURE — 87040 BLOOD CULTURE FOR BACTERIA: CPT | Performed by: STUDENT IN AN ORGANIZED HEALTH CARE EDUCATION/TRAINING PROGRAM

## 2024-09-17 PROCEDURE — 99900035 HC TECH TIME PER 15 MIN (STAT)

## 2024-09-17 PROCEDURE — 63600175 PHARM REV CODE 636 W HCPCS: Performed by: STUDENT IN AN ORGANIZED HEALTH CARE EDUCATION/TRAINING PROGRAM

## 2024-09-17 PROCEDURE — 83735 ASSAY OF MAGNESIUM: CPT | Performed by: STUDENT IN AN ORGANIZED HEALTH CARE EDUCATION/TRAINING PROGRAM

## 2024-09-17 PROCEDURE — 85027 COMPLETE CBC AUTOMATED: CPT | Performed by: FAMILY MEDICINE

## 2024-09-17 PROCEDURE — 02HV33Z INSERTION OF INFUSION DEVICE INTO SUPERIOR VENA CAVA, PERCUTANEOUS APPROACH: ICD-10-PCS | Performed by: SPECIALIST

## 2024-09-17 PROCEDURE — 27000221 HC OXYGEN, UP TO 24 HOURS

## 2024-09-17 PROCEDURE — 83036 HEMOGLOBIN GLYCOSYLATED A1C: CPT | Performed by: FAMILY MEDICINE

## 2024-09-17 PROCEDURE — 83735 ASSAY OF MAGNESIUM: CPT | Mod: 91 | Performed by: NURSE PRACTITIONER

## 2024-09-17 PROCEDURE — 63600175 PHARM REV CODE 636 W HCPCS: Performed by: FAMILY MEDICINE

## 2024-09-17 PROCEDURE — 87088 URINE BACTERIA CULTURE: CPT | Performed by: STUDENT IN AN ORGANIZED HEALTH CARE EDUCATION/TRAINING PROGRAM

## 2024-09-17 PROCEDURE — 25000003 PHARM REV CODE 250: Performed by: STUDENT IN AN ORGANIZED HEALTH CARE EDUCATION/TRAINING PROGRAM

## 2024-09-17 PROCEDURE — 83880 ASSAY OF NATRIURETIC PEPTIDE: CPT | Performed by: STUDENT IN AN ORGANIZED HEALTH CARE EDUCATION/TRAINING PROGRAM

## 2024-09-17 PROCEDURE — 63600175 PHARM REV CODE 636 W HCPCS: Performed by: INTERNAL MEDICINE

## 2024-09-17 PROCEDURE — 25000003 PHARM REV CODE 250: Performed by: EMERGENCY MEDICINE

## 2024-09-17 PROCEDURE — 83605 ASSAY OF LACTIC ACID: CPT | Performed by: STUDENT IN AN ORGANIZED HEALTH CARE EDUCATION/TRAINING PROGRAM

## 2024-09-17 PROCEDURE — 85027 COMPLETE CBC AUTOMATED: CPT | Mod: 91 | Performed by: STUDENT IN AN ORGANIZED HEALTH CARE EDUCATION/TRAINING PROGRAM

## 2024-09-17 PROCEDURE — 84100 ASSAY OF PHOSPHORUS: CPT | Performed by: STUDENT IN AN ORGANIZED HEALTH CARE EDUCATION/TRAINING PROGRAM

## 2024-09-17 PROCEDURE — 36415 COLL VENOUS BLD VENIPUNCTURE: CPT | Performed by: STUDENT IN AN ORGANIZED HEALTH CARE EDUCATION/TRAINING PROGRAM

## 2024-09-17 PROCEDURE — 94761 N-INVAS EAR/PLS OXIMETRY MLT: CPT

## 2024-09-17 PROCEDURE — 80048 BASIC METABOLIC PNL TOTAL CA: CPT | Mod: 91,XB | Performed by: NURSE PRACTITIONER

## 2024-09-17 PROCEDURE — 63600175 PHARM REV CODE 636 W HCPCS

## 2024-09-17 PROCEDURE — 85007 BL SMEAR W/DIFF WBC COUNT: CPT | Performed by: FAMILY MEDICINE

## 2024-09-17 PROCEDURE — 87086 URINE CULTURE/COLONY COUNT: CPT | Performed by: STUDENT IN AN ORGANIZED HEALTH CARE EDUCATION/TRAINING PROGRAM

## 2024-09-17 PROCEDURE — 94799 UNLISTED PULMONARY SVC/PX: CPT

## 2024-09-17 PROCEDURE — 21400001 HC TELEMETRY ROOM

## 2024-09-17 PROCEDURE — 81000 URINALYSIS NONAUTO W/SCOPE: CPT | Performed by: STUDENT IN AN ORGANIZED HEALTH CARE EDUCATION/TRAINING PROGRAM

## 2024-09-17 PROCEDURE — 20000000 HC ICU ROOM

## 2024-09-17 PROCEDURE — 80048 BASIC METABOLIC PNL TOTAL CA: CPT | Mod: XB | Performed by: FAMILY MEDICINE

## 2024-09-17 PROCEDURE — 85007 BL SMEAR W/DIFF WBC COUNT: CPT | Mod: 91 | Performed by: STUDENT IN AN ORGANIZED HEALTH CARE EDUCATION/TRAINING PROGRAM

## 2024-09-17 PROCEDURE — 25000003 PHARM REV CODE 250: Performed by: INTERNAL MEDICINE

## 2024-09-17 PROCEDURE — 84484 ASSAY OF TROPONIN QUANT: CPT | Performed by: STUDENT IN AN ORGANIZED HEALTH CARE EDUCATION/TRAINING PROGRAM

## 2024-09-17 RX ORDER — MAGNESIUM SULFATE HEPTAHYDRATE 40 MG/ML
2 INJECTION, SOLUTION INTRAVENOUS ONCE
Status: COMPLETED | OUTPATIENT
Start: 2024-09-17 | End: 2024-09-17

## 2024-09-17 RX ORDER — POTASSIUM CHLORIDE 7.45 MG/ML
10 INJECTION INTRAVENOUS
Status: COMPLETED | OUTPATIENT
Start: 2024-09-17 | End: 2024-09-17

## 2024-09-17 RX ORDER — SODIUM,POTASSIUM PHOSPHATES 280-250MG
2 POWDER IN PACKET (EA) ORAL ONCE
Status: COMPLETED | OUTPATIENT
Start: 2024-09-17 | End: 2024-09-17

## 2024-09-17 RX ORDER — CIPROFLOXACIN 2 MG/ML
400 INJECTION, SOLUTION INTRAVENOUS
Status: DISCONTINUED | OUTPATIENT
Start: 2024-09-17 | End: 2024-09-20

## 2024-09-17 RX ORDER — SODIUM CHLORIDE, SODIUM LACTATE, POTASSIUM CHLORIDE, CALCIUM CHLORIDE 600; 310; 30; 20 MG/100ML; MG/100ML; MG/100ML; MG/100ML
INJECTION, SOLUTION INTRAVENOUS CONTINUOUS
Status: DISCONTINUED | OUTPATIENT
Start: 2024-09-17 | End: 2024-09-19

## 2024-09-17 RX ADMIN — SODIUM CHLORIDE, POTASSIUM CHLORIDE, SODIUM LACTATE AND CALCIUM CHLORIDE 1000 ML: 600; 310; 30; 20 INJECTION, SOLUTION INTRAVENOUS at 08:09

## 2024-09-17 RX ADMIN — PANTOPRAZOLE SODIUM 40 MG: 40 TABLET, DELAYED RELEASE ORAL at 08:09

## 2024-09-17 RX ADMIN — FOLIC ACID 1000 MCG: 1 TABLET ORAL at 08:09

## 2024-09-17 RX ADMIN — CIPROFLOXACIN 400 MG: 2 INJECTION, SOLUTION INTRAVENOUS at 08:09

## 2024-09-17 RX ADMIN — POTASSIUM & SODIUM PHOSPHATES POWDER PACK 280-160-250 MG 2 PACKET: 280-160-250 PACK at 05:09

## 2024-09-17 RX ADMIN — AMIODARONE HYDROCHLORIDE 0.5 MG/MIN: 1.8 INJECTION, SOLUTION INTRAVENOUS at 09:09

## 2024-09-17 RX ADMIN — CIPROFLOXACIN 400 MG: 2 INJECTION, SOLUTION INTRAVENOUS at 07:09

## 2024-09-17 RX ADMIN — SODIUM CHLORIDE, POTASSIUM CHLORIDE, SODIUM LACTATE AND CALCIUM CHLORIDE 500 ML: 600; 310; 30; 20 INJECTION, SOLUTION INTRAVENOUS at 02:09

## 2024-09-17 RX ADMIN — AMIODARONE HYDROCHLORIDE 0.5 MG/MIN: 1.8 INJECTION, SOLUTION INTRAVENOUS at 04:09

## 2024-09-17 RX ADMIN — PANTOPRAZOLE SODIUM 40 MG: 40 TABLET, DELAYED RELEASE ORAL at 07:09

## 2024-09-17 RX ADMIN — POTASSIUM CHLORIDE 10 MEQ: 10 INJECTION, SOLUTION INTRAVENOUS at 10:09

## 2024-09-17 RX ADMIN — POTASSIUM CHLORIDE 10 MEQ: 10 INJECTION, SOLUTION INTRAVENOUS at 07:09

## 2024-09-17 RX ADMIN — NOREPINEPHRINE BITARTRATE 0.06 MCG/KG/MIN: 4 INJECTION, SOLUTION INTRAVENOUS at 02:09

## 2024-09-17 RX ADMIN — MAGNESIUM SULFATE HEPTAHYDRATE 2 G: 40 INJECTION, SOLUTION INTRAVENOUS at 12:09

## 2024-09-17 RX ADMIN — POTASSIUM BICARBONATE 25 MEQ: 978 TABLET, EFFERVESCENT ORAL at 10:09

## 2024-09-17 RX ADMIN — POTASSIUM & SODIUM PHOSPHATES POWDER PACK 280-160-250 MG 2 PACKET: 280-160-250 PACK at 12:09

## 2024-09-17 RX ADMIN — POTASSIUM CHLORIDE 10 MEQ: 7.46 INJECTION, SOLUTION INTRAVENOUS at 02:09

## 2024-09-17 RX ADMIN — SODIUM CHLORIDE, POTASSIUM CHLORIDE, SODIUM LACTATE AND CALCIUM CHLORIDE: 600; 310; 30; 20 INJECTION, SOLUTION INTRAVENOUS at 02:09

## 2024-09-17 RX ADMIN — AMIODARONE HYDROCHLORIDE 0.5 MG/MIN: 1.8 INJECTION, SOLUTION INTRAVENOUS at 02:09

## 2024-09-17 RX ADMIN — DEXTROSE MONOHYDRATE 1250 MG: 50 INJECTION, SOLUTION INTRAVENOUS at 09:09

## 2024-09-17 NOTE — ANESTHESIA PROCEDURE NOTES
Central Line    Diagnosis: sepsis  Patient location during procedure: ICU  Procedure Urgency: Emergent  Procedure start time: 9/17/2024 7:57 AM  Timeout: 9/17/2024 7:55 AM  Procedure end time: 9/17/2024 8:05 AM      Staffing  Authorizing Provider: Armin Ng MD  Performing Provider: Armin Ng MD    Staffing  Performed: anesthesiologist   Anesthesiologist: Armin Ng MD  Performed by: Armin Ng MD  Authorized by: Armin Ng MD    Anesthesiologist was present at the time of the procedure.  Preanesthetic Checklist  Completed: patient identified, IV checked, site marked, risks and benefits discussed, surgical consent, monitors and equipment checked, pre-op evaluation, timeout performed and anesthesia consent given  Indication   Indication: vascular access     Anesthesia     Central Line   Skin Prep: skin prepped with ChloraPrep, skin prep agent completely dried prior to procedure  Sterile Barriers Followed: Yes    All five maximal barriers used- gloves, gown, cap, mask, and large sterile sheet    hand hygiene performed prior to central venous catheter insertion  Location: right internal jugular.   Catheter type: triple lumen  Catheter Size: 9 Fr  Ultrasound: none      Manometry: Venous cannualation confirmed by visual estimation of blood vessel pressure using manometry.  Insertion Attempts: 1   Securement:line sutured, chlorhexidine patch, sterile dressing applied and blood return through all ports    Post-Procedure   X-Ray: no pneumothorax on x-ray, placement verified by x-ray and successful placement   Adverse Events:none      Guidewire Guidewire removed intact.   Additional Notes  Patient tolerated procedure well

## 2024-09-18 ENCOUNTER — ANESTHESIA EVENT (OUTPATIENT)
Dept: SURGERY | Facility: HOSPITAL | Age: 69
End: 2024-09-18
Payer: MEDICARE

## 2024-09-18 LAB
ANION GAP SERPL CALC-SCNC: 12 MMOL/L (ref 8–16)
ANISOCYTOSIS BLD QL SMEAR: SLIGHT
BASOPHILS # BLD AUTO: 0.03 K/UL (ref 0–0.2)
BASOPHILS NFR BLD: 0 % (ref 0–1.9)
BASOPHILS NFR BLD: 0.2 % (ref 0–1.9)
BLD PROD TYP BPU: NORMAL
BLOOD UNIT EXPIRATION DATE: NORMAL
BLOOD UNIT TYPE CODE: 600
BLOOD UNIT TYPE: NORMAL
BUN SERPL-MCNC: 30 MG/DL (ref 8–23)
CALCIUM SERPL-MCNC: 6.9 MG/DL (ref 8.7–10.5)
CHLORIDE SERPL-SCNC: 104 MMOL/L (ref 95–110)
CO2 SERPL-SCNC: 18 MMOL/L (ref 23–29)
CODING SYSTEM: NORMAL
CORTIS SERPL-MCNC: 16.6 UG/DL
CREAT SERPL-MCNC: 2 MG/DL (ref 0.5–1.4)
CROSSMATCH INTERPRETATION: NORMAL
DIFFERENTIAL METHOD BLD: ABNORMAL
DIFFERENTIAL METHOD BLD: ABNORMAL
DISPENSE STATUS: NORMAL
EOSINOPHIL # BLD AUTO: 0 K/UL (ref 0–0.5)
EOSINOPHIL NFR BLD: 0.2 % (ref 0–8)
EOSINOPHIL NFR BLD: 1 % (ref 0–8)
ERYTHROCYTE [DISTWIDTH] IN BLOOD BY AUTOMATED COUNT: 26.7 % (ref 11.5–14.5)
ERYTHROCYTE [DISTWIDTH] IN BLOOD BY AUTOMATED COUNT: 27.2 % (ref 11.5–14.5)
EST. GFR  (NO RACE VARIABLE): 26.2 ML/MIN/1.73 M^2
GLUCOSE SERPL-MCNC: 137 MG/DL (ref 70–110)
HCT VFR BLD AUTO: 20.8 % (ref 37–48.5)
HCT VFR BLD AUTO: 21 % (ref 37–48.5)
HGB BLD-MCNC: 7 G/DL (ref 12–16)
HGB BLD-MCNC: 7.1 G/DL (ref 12–16)
IMM GRANULOCYTES # BLD AUTO: 0.5 K/UL (ref 0–0.04)
IMM GRANULOCYTES # BLD AUTO: ABNORMAL K/UL
IMM GRANULOCYTES NFR BLD AUTO: 4.1 % (ref 0–0.5)
IMM GRANULOCYTES NFR BLD AUTO: ABNORMAL %
LYMPHOCYTES # BLD AUTO: 0.9 K/UL (ref 1–4.8)
LYMPHOCYTES NFR BLD: 15 % (ref 18–48)
LYMPHOCYTES NFR BLD: 7.2 % (ref 18–48)
MAGNESIUM SERPL-MCNC: 2 MG/DL (ref 1.6–2.6)
MCH RBC QN AUTO: 34 PG (ref 27–31)
MCH RBC QN AUTO: 34.1 PG (ref 27–31)
MCHC RBC AUTO-ENTMCNC: 33.3 G/DL (ref 32–36)
MCHC RBC AUTO-ENTMCNC: 34.1 G/DL (ref 32–36)
MCV RBC AUTO: 100 FL (ref 82–98)
MCV RBC AUTO: 102 FL (ref 82–98)
MONOCYTES # BLD AUTO: 0.7 K/UL (ref 0.3–1)
MONOCYTES NFR BLD: 5.9 % (ref 4–15)
MONOCYTES NFR BLD: 6 % (ref 4–15)
MYELOCYTES NFR BLD MANUAL: 1 %
NEUTROPHILS # BLD AUTO: 10.1 K/UL (ref 1.8–7.7)
NEUTROPHILS NFR BLD: 72 % (ref 38–73)
NEUTROPHILS NFR BLD: 82.4 % (ref 38–73)
NEUTS BAND NFR BLD MANUAL: 5 %
NRBC BLD-RTO: 1 /100 WBC
NRBC BLD-RTO: 1 /100 WBC
NUM UNITS TRANS PACKED RBC: NORMAL
PHOSPHATE SERPL-MCNC: 3 MG/DL (ref 2.7–4.5)
PLATELET # BLD AUTO: 183 K/UL (ref 150–450)
PLATELET # BLD AUTO: 198 K/UL (ref 150–450)
PLATELET BLD QL SMEAR: ABNORMAL
PMV BLD AUTO: 10 FL (ref 9.2–12.9)
PMV BLD AUTO: 10.1 FL (ref 9.2–12.9)
POTASSIUM SERPL-SCNC: 3.4 MMOL/L (ref 3.5–5.1)
RBC # BLD AUTO: 2.06 M/UL (ref 4–5.4)
RBC # BLD AUTO: 2.08 M/UL (ref 4–5.4)
SODIUM SERPL-SCNC: 134 MMOL/L (ref 136–145)
VANCOMYCIN SERPL-MCNC: 14.5 UG/ML
WBC # BLD AUTO: 12.28 K/UL (ref 3.9–12.7)
WBC # BLD AUTO: 12.67 K/UL (ref 3.9–12.7)

## 2024-09-18 PROCEDURE — 63600175 PHARM REV CODE 636 W HCPCS: Performed by: STUDENT IN AN ORGANIZED HEALTH CARE EDUCATION/TRAINING PROGRAM

## 2024-09-18 PROCEDURE — 94761 N-INVAS EAR/PLS OXIMETRY MLT: CPT

## 2024-09-18 PROCEDURE — P9016 RBC LEUKOCYTES REDUCED: HCPCS | Performed by: STUDENT IN AN ORGANIZED HEALTH CARE EDUCATION/TRAINING PROGRAM

## 2024-09-18 PROCEDURE — 63600175 PHARM REV CODE 636 W HCPCS: Performed by: INTERNAL MEDICINE

## 2024-09-18 PROCEDURE — 27000221 HC OXYGEN, UP TO 24 HOURS

## 2024-09-18 PROCEDURE — 36415 COLL VENOUS BLD VENIPUNCTURE: CPT | Performed by: STUDENT IN AN ORGANIZED HEALTH CARE EDUCATION/TRAINING PROGRAM

## 2024-09-18 PROCEDURE — 80202 ASSAY OF VANCOMYCIN: CPT | Performed by: STUDENT IN AN ORGANIZED HEALTH CARE EDUCATION/TRAINING PROGRAM

## 2024-09-18 PROCEDURE — 80048 BASIC METABOLIC PNL TOTAL CA: CPT | Performed by: FAMILY MEDICINE

## 2024-09-18 PROCEDURE — 85007 BL SMEAR W/DIFF WBC COUNT: CPT | Performed by: FAMILY MEDICINE

## 2024-09-18 PROCEDURE — 86920 COMPATIBILITY TEST SPIN: CPT | Performed by: STUDENT IN AN ORGANIZED HEALTH CARE EDUCATION/TRAINING PROGRAM

## 2024-09-18 PROCEDURE — 25000003 PHARM REV CODE 250: Performed by: EMERGENCY MEDICINE

## 2024-09-18 PROCEDURE — 99233 SBSQ HOSP IP/OBS HIGH 50: CPT | Mod: ,,, | Performed by: STUDENT IN AN ORGANIZED HEALTH CARE EDUCATION/TRAINING PROGRAM

## 2024-09-18 PROCEDURE — 25000003 PHARM REV CODE 250: Performed by: FAMILY MEDICINE

## 2024-09-18 PROCEDURE — 85025 COMPLETE CBC W/AUTO DIFF WBC: CPT | Performed by: STUDENT IN AN ORGANIZED HEALTH CARE EDUCATION/TRAINING PROGRAM

## 2024-09-18 PROCEDURE — 20000000 HC ICU ROOM

## 2024-09-18 PROCEDURE — 83735 ASSAY OF MAGNESIUM: CPT | Performed by: STUDENT IN AN ORGANIZED HEALTH CARE EDUCATION/TRAINING PROGRAM

## 2024-09-18 PROCEDURE — 25000003 PHARM REV CODE 250: Performed by: STUDENT IN AN ORGANIZED HEALTH CARE EDUCATION/TRAINING PROGRAM

## 2024-09-18 PROCEDURE — 99222 1ST HOSP IP/OBS MODERATE 55: CPT | Mod: ,,, | Performed by: SPECIALIST

## 2024-09-18 PROCEDURE — 82533 TOTAL CORTISOL: CPT | Performed by: STUDENT IN AN ORGANIZED HEALTH CARE EDUCATION/TRAINING PROGRAM

## 2024-09-18 PROCEDURE — 85027 COMPLETE CBC AUTOMATED: CPT | Performed by: FAMILY MEDICINE

## 2024-09-18 PROCEDURE — 82024 ASSAY OF ACTH: CPT | Performed by: STUDENT IN AN ORGANIZED HEALTH CARE EDUCATION/TRAINING PROGRAM

## 2024-09-18 PROCEDURE — 84100 ASSAY OF PHOSPHORUS: CPT | Performed by: STUDENT IN AN ORGANIZED HEALTH CARE EDUCATION/TRAINING PROGRAM

## 2024-09-18 RX ORDER — MIDODRINE HYDROCHLORIDE 5 MG/1
10 TABLET ORAL 3 TIMES DAILY
Status: DISCONTINUED | OUTPATIENT
Start: 2024-09-18 | End: 2024-09-23

## 2024-09-18 RX ORDER — MIDODRINE HYDROCHLORIDE 5 MG/1
5 TABLET ORAL 3 TIMES DAILY
Status: DISCONTINUED | OUTPATIENT
Start: 2024-09-18 | End: 2024-09-18

## 2024-09-18 RX ORDER — HYDROCODONE BITARTRATE AND ACETAMINOPHEN 500; 5 MG/1; MG/1
TABLET ORAL
Status: DISCONTINUED | OUTPATIENT
Start: 2024-09-18 | End: 2024-09-19

## 2024-09-18 RX ORDER — OXYCODONE HYDROCHLORIDE 5 MG/1
5 TABLET ORAL EVERY 6 HOURS PRN
Status: DISCONTINUED | OUTPATIENT
Start: 2024-09-18 | End: 2024-09-27 | Stop reason: HOSPADM

## 2024-09-18 RX ADMIN — MIDODRINE HYDROCHLORIDE 5 MG: 5 TABLET ORAL at 08:09

## 2024-09-18 RX ADMIN — FOLIC ACID 1000 MCG: 1 TABLET ORAL at 08:09

## 2024-09-18 RX ADMIN — HYDROCORTISONE SODIUM SUCCINATE 50 MG: 100 INJECTION, POWDER, FOR SOLUTION INTRAMUSCULAR; INTRAVENOUS at 06:09

## 2024-09-18 RX ADMIN — CIPROFLOXACIN 400 MG: 2 INJECTION, SOLUTION INTRAVENOUS at 09:09

## 2024-09-18 RX ADMIN — MIDODRINE HYDROCHLORIDE 10 MG: 5 TABLET ORAL at 09:09

## 2024-09-18 RX ADMIN — AMIODARONE HYDROCHLORIDE 0.5 MG/MIN: 1.8 INJECTION, SOLUTION INTRAVENOUS at 01:09

## 2024-09-18 RX ADMIN — PANTOPRAZOLE SODIUM 40 MG: 40 TABLET, DELAYED RELEASE ORAL at 08:09

## 2024-09-18 RX ADMIN — MIDODRINE HYDROCHLORIDE 10 MG: 5 TABLET ORAL at 02:09

## 2024-09-18 RX ADMIN — PANTOPRAZOLE SODIUM 40 MG: 40 TABLET, DELAYED RELEASE ORAL at 09:09

## 2024-09-18 RX ADMIN — HYDROCORTISONE SODIUM SUCCINATE 50 MG: 100 INJECTION, POWDER, FOR SOLUTION INTRAMUSCULAR; INTRAVENOUS at 01:09

## 2024-09-18 RX ADMIN — CIPROFLOXACIN 400 MG: 2 INJECTION, SOLUTION INTRAVENOUS at 08:09

## 2024-09-18 RX ADMIN — VANCOMYCIN HYDROCHLORIDE 750 MG: 750 INJECTION, POWDER, LYOPHILIZED, FOR SOLUTION INTRAVENOUS at 09:09

## 2024-09-18 RX ADMIN — NOREPINEPHRINE BITARTRATE 0.08 MCG/KG/MIN: 4 INJECTION, SOLUTION INTRAVENOUS at 03:09

## 2024-09-18 RX ADMIN — OXYCODONE HYDROCHLORIDE 5 MG: 5 TABLET ORAL at 08:09

## 2024-09-18 RX ADMIN — LEVOTHYROXINE SODIUM 137 MCG: 25 TABLET ORAL at 06:09

## 2024-09-18 RX ADMIN — HYDROCORTISONE SODIUM SUCCINATE 50 MG: 100 INJECTION, POWDER, FOR SOLUTION INTRAMUSCULAR; INTRAVENOUS at 08:09

## 2024-09-18 RX ADMIN — NOREPINEPHRINE BITARTRATE 0.04 MCG/KG/MIN: 4 INJECTION, SOLUTION INTRAVENOUS at 02:09

## 2024-09-18 NOTE — ANESTHESIA PREPROCEDURE EVALUATION
09/18/2024  Jennifer Hurst is a 69 y.o., female.   has a past surgical history that includes endometeriosis; Colon surgery; Colonoscopy; and Upper gastrointestinal endoscopy.       Pre-op Assessment    I have reviewed the Patient Summary Reports.     I have reviewed the Nursing Notes. I have reviewed the NPO Status.   I have reviewed the Medications.     Review of Systems  Anesthesia Hx:  No problems with previous Anesthesia             Denies Family Hx of Anesthesia complications.    Denies Personal Hx of Anesthesia complications.                    Social:  Non-Smoker       Hematology/Oncology:    Oncology Normal    -- Anemia:                                  Cardiovascular:     Hypertension    Dysrhythmias      PVD hyperlipidemia   ECG has been reviewed. 9/24 ECG- a flutter  9/24 ECHO-   A-Fib observed  ·  Left Ventricle: The left ventricle is normal in size. Mildly increased wall thickness. There is concentric remodeling. Global hypokinesis present. There is moderately reduced systolic function with a visually estimated ejection fraction of 35 - 40%. Ejection fraction by visual approximation is 40%. Unable to assess diastolic function due to atrial fibrillation.  ·  Right Ventricle: Normal right ventricular cavity size. Systolic function is mildly reduced.  ·  Left Atrium: Left atrium is moderately dilated.  ·  Mitral Valve: Mildly calcified leaflets. There is mild regurgitation.  ·  IVC/SVC: Intermediate venous pressure at 8 mmHg.  ·  Pericardium: There is a trivial effusion. No indication of cardiac tamponade.  Sepsis shock                             Pulmonary:  Pulmonary Normal                       Renal/:  Chronic Renal Disease, CKD                Hepatic/GI:     GERD             Musculoskeletal:  Arthritis   Sacral decubiti            Neurological:           parkinson's                             Endocrine:   Hypothyroidism          Psych:   anxiety               Physical Exam  General: Well nourished, Cooperative, Alert and Oriented    Airway:  Mallampati: II   Mouth Opening: Normal  TM Distance: Normal  Tongue: Normal  Neck ROM: Normal ROM    Dental:  Intact    Chest/Lungs:  Clear to auscultation, Normal Respiratory Rate    Heart:  Rate: Normal  Rhythm: Regular Rhythm    Anesthesia Plan  Type of Anesthesia, risks & benefits discussed:    Anesthesia Type: Gen ETT  Intra-op Monitoring Plan: Standard ASA Monitors  Post Op Pain Control Plan: IV/PO Opioids PRN  Induction:  IV  Airway Plan: Video  Informed Consent: Informed consent signed with the Patient and all parties understand the risks and agree with anesthesia plan.  All questions answered.   ASA Score: 3 Emergent  Day of Surgery Review of History & Physical: H&P Update referred to the surgeon/provider.    Ready For Surgery From Anesthesia Perspective.   .

## 2024-09-19 ENCOUNTER — ANESTHESIA (OUTPATIENT)
Dept: SURGERY | Facility: HOSPITAL | Age: 69
End: 2024-09-19
Payer: MEDICARE

## 2024-09-19 PROBLEM — L89.153 SACRAL DECUBITUS ULCER, STAGE III: Chronic | Status: ACTIVE | Noted: 2024-09-19

## 2024-09-19 LAB
ALBUMIN SERPL BCP-MCNC: 1.8 G/DL (ref 3.5–5.2)
ALP SERPL-CCNC: 55 U/L (ref 55–135)
ALT SERPL W/O P-5'-P-CCNC: 13 U/L (ref 10–44)
ANION GAP SERPL CALC-SCNC: 10 MMOL/L (ref 8–16)
AST SERPL-CCNC: 36 U/L (ref 10–40)
BASOPHILS # BLD AUTO: 0.01 K/UL (ref 0–0.2)
BASOPHILS NFR BLD: 0.1 % (ref 0–1.9)
BILIRUB SERPL-MCNC: 1.8 MG/DL (ref 0.1–1)
BUN SERPL-MCNC: 25 MG/DL (ref 8–23)
CALCIUM SERPL-MCNC: 7 MG/DL (ref 8.7–10.5)
CHLORIDE SERPL-SCNC: 105 MMOL/L (ref 95–110)
CO2 SERPL-SCNC: 19 MMOL/L (ref 23–29)
CREAT SERPL-MCNC: 1.8 MG/DL (ref 0.5–1.4)
DIFFERENTIAL METHOD BLD: ABNORMAL
EOSINOPHIL # BLD AUTO: 0 K/UL (ref 0–0.5)
EOSINOPHIL NFR BLD: 0 % (ref 0–8)
ERYTHROCYTE [DISTWIDTH] IN BLOOD BY AUTOMATED COUNT: 24.4 % (ref 11.5–14.5)
EST. GFR  (NO RACE VARIABLE): 29.8 ML/MIN/1.73 M^2
GLUCOSE SERPL-MCNC: 140 MG/DL (ref 70–110)
HCT VFR BLD AUTO: 23.2 % (ref 37–48.5)
HGB BLD-MCNC: 8 G/DL (ref 12–16)
IMM GRANULOCYTES # BLD AUTO: 0.29 K/UL (ref 0–0.04)
IMM GRANULOCYTES NFR BLD AUTO: 3 % (ref 0–0.5)
LACTATE SERPL-SCNC: 2.5 MMOL/L (ref 0.5–2.2)
LYMPHOCYTES # BLD AUTO: 1 K/UL (ref 1–4.8)
LYMPHOCYTES NFR BLD: 10 % (ref 18–48)
MAGNESIUM SERPL-MCNC: 1.8 MG/DL (ref 1.6–2.6)
MCH RBC QN AUTO: 33.5 PG (ref 27–31)
MCHC RBC AUTO-ENTMCNC: 34.5 G/DL (ref 32–36)
MCV RBC AUTO: 97 FL (ref 82–98)
MONOCYTES # BLD AUTO: 0.5 K/UL (ref 0.3–1)
MONOCYTES NFR BLD: 4.7 % (ref 4–15)
NEUTROPHILS # BLD AUTO: 7.9 K/UL (ref 1.8–7.7)
NEUTROPHILS NFR BLD: 82.2 % (ref 38–73)
NRBC BLD-RTO: 0 /100 WBC
PHOSPHATE SERPL-MCNC: 3.6 MG/DL (ref 2.7–4.5)
PLATELET # BLD AUTO: 126 K/UL (ref 150–450)
PMV BLD AUTO: 10.2 FL (ref 9.2–12.9)
POTASSIUM SERPL-SCNC: 3.9 MMOL/L (ref 3.5–5.1)
PROT SERPL-MCNC: 5.1 G/DL (ref 6–8.4)
RBC # BLD AUTO: 2.39 M/UL (ref 4–5.4)
SODIUM SERPL-SCNC: 134 MMOL/L (ref 136–145)
VANCOMYCIN SERPL-MCNC: 16.6 UG/ML
WBC # BLD AUTO: 9.6 K/UL (ref 3.9–12.7)

## 2024-09-19 PROCEDURE — 25000003 PHARM REV CODE 250: Performed by: FAMILY MEDICINE

## 2024-09-19 PROCEDURE — 37000008 HC ANESTHESIA 1ST 15 MINUTES: Performed by: SPECIALIST

## 2024-09-19 PROCEDURE — 88305 TISSUE EXAM BY PATHOLOGIST: CPT | Mod: 26,,, | Performed by: PATHOLOGY

## 2024-09-19 PROCEDURE — 85025 COMPLETE CBC W/AUTO DIFF WBC: CPT | Performed by: FAMILY MEDICINE

## 2024-09-19 PROCEDURE — 36000706: Performed by: SPECIALIST

## 2024-09-19 PROCEDURE — 21400001 HC TELEMETRY ROOM

## 2024-09-19 PROCEDURE — 11000001 HC ACUTE MED/SURG PRIVATE ROOM

## 2024-09-19 PROCEDURE — 84100 ASSAY OF PHOSPHORUS: CPT | Performed by: STUDENT IN AN ORGANIZED HEALTH CARE EDUCATION/TRAINING PROGRAM

## 2024-09-19 PROCEDURE — 88305 TISSUE EXAM BY PATHOLOGIST: CPT | Performed by: PATHOLOGY

## 2024-09-19 PROCEDURE — 36000707: Performed by: SPECIALIST

## 2024-09-19 PROCEDURE — 25000003 PHARM REV CODE 250: Performed by: SPECIALIST

## 2024-09-19 PROCEDURE — 11042 DBRDMT SUBQ TIS 1ST 20SQCM/<: CPT | Mod: ,,, | Performed by: SPECIALIST

## 2024-09-19 PROCEDURE — 83605 ASSAY OF LACTIC ACID: CPT | Performed by: INTERNAL MEDICINE

## 2024-09-19 PROCEDURE — 88312 SPECIAL STAINS GROUP 1: CPT | Mod: 26,,, | Performed by: PATHOLOGY

## 2024-09-19 PROCEDURE — 94799 UNLISTED PULMONARY SVC/PX: CPT

## 2024-09-19 PROCEDURE — 25000003 PHARM REV CODE 250: Performed by: STUDENT IN AN ORGANIZED HEALTH CARE EDUCATION/TRAINING PROGRAM

## 2024-09-19 PROCEDURE — 80202 ASSAY OF VANCOMYCIN: CPT | Performed by: STUDENT IN AN ORGANIZED HEALTH CARE EDUCATION/TRAINING PROGRAM

## 2024-09-19 PROCEDURE — 88342 IMHCHEM/IMCYTCHM 1ST ANTB: CPT | Mod: 26,,, | Performed by: PATHOLOGY

## 2024-09-19 PROCEDURE — 0JB70ZZ EXCISION OF BACK SUBCUTANEOUS TISSUE AND FASCIA, OPEN APPROACH: ICD-10-PCS | Performed by: SPECIALIST

## 2024-09-19 PROCEDURE — 63600175 PHARM REV CODE 636 W HCPCS: Performed by: NURSE ANESTHETIST, CERTIFIED REGISTERED

## 2024-09-19 PROCEDURE — 99900031 HC PATIENT EDUCATION (STAT)

## 2024-09-19 PROCEDURE — 88312 SPECIAL STAINS GROUP 1: CPT | Performed by: PATHOLOGY

## 2024-09-19 PROCEDURE — 37000009 HC ANESTHESIA EA ADD 15 MINS: Performed by: SPECIALIST

## 2024-09-19 PROCEDURE — 88342 IMHCHEM/IMCYTCHM 1ST ANTB: CPT | Performed by: PATHOLOGY

## 2024-09-19 PROCEDURE — 94761 N-INVAS EAR/PLS OXIMETRY MLT: CPT

## 2024-09-19 PROCEDURE — 80053 COMPREHEN METABOLIC PANEL: CPT | Performed by: STUDENT IN AN ORGANIZED HEALTH CARE EDUCATION/TRAINING PROGRAM

## 2024-09-19 PROCEDURE — 83735 ASSAY OF MAGNESIUM: CPT | Performed by: STUDENT IN AN ORGANIZED HEALTH CARE EDUCATION/TRAINING PROGRAM

## 2024-09-19 PROCEDURE — 71000033 HC RECOVERY, INTIAL HOUR: Performed by: SPECIALIST

## 2024-09-19 PROCEDURE — 63600175 PHARM REV CODE 636 W HCPCS: Performed by: SPECIALIST

## 2024-09-19 PROCEDURE — 11045 DBRDMT SUBQ TISS EACH ADDL: CPT | Mod: ,,, | Performed by: SPECIALIST

## 2024-09-19 PROCEDURE — 25000003 PHARM REV CODE 250: Performed by: NURSE ANESTHETIST, CERTIFIED REGISTERED

## 2024-09-19 PROCEDURE — 25000003 PHARM REV CODE 250: Performed by: INTERNAL MEDICINE

## 2024-09-19 PROCEDURE — 63600175 PHARM REV CODE 636 W HCPCS: Performed by: INTERNAL MEDICINE

## 2024-09-19 PROCEDURE — 99900035 HC TECH TIME PER 15 MIN (STAT)

## 2024-09-19 PROCEDURE — 63600175 PHARM REV CODE 636 W HCPCS: Performed by: STUDENT IN AN ORGANIZED HEALTH CARE EDUCATION/TRAINING PROGRAM

## 2024-09-19 RX ORDER — ETOMIDATE 2 MG/ML
INJECTION INTRAVENOUS
Status: DISCONTINUED | OUTPATIENT
Start: 2024-09-19 | End: 2024-09-19

## 2024-09-19 RX ORDER — ACETAMINOPHEN 10 MG/ML
INJECTION, SOLUTION INTRAVENOUS
Status: DISCONTINUED | OUTPATIENT
Start: 2024-09-19 | End: 2024-09-19

## 2024-09-19 RX ORDER — SODIUM CHLORIDE, SODIUM LACTATE, POTASSIUM CHLORIDE, CALCIUM CHLORIDE 600; 310; 30; 20 MG/100ML; MG/100ML; MG/100ML; MG/100ML
125 INJECTION, SOLUTION INTRAVENOUS CONTINUOUS
Status: DISCONTINUED | OUTPATIENT
Start: 2024-09-19 | End: 2024-09-19

## 2024-09-19 RX ORDER — LIDOCAINE HYDROCHLORIDE 10 MG/ML
1 INJECTION, SOLUTION EPIDURAL; INFILTRATION; INTRACAUDAL; PERINEURAL ONCE
Status: COMPLETED | OUTPATIENT
Start: 2024-09-19 | End: 2024-09-19

## 2024-09-19 RX ORDER — PROPOFOL 10 MG/ML
VIAL (ML) INTRAVENOUS
Status: DISCONTINUED | OUTPATIENT
Start: 2024-09-19 | End: 2024-09-19

## 2024-09-19 RX ORDER — ONDANSETRON HYDROCHLORIDE 2 MG/ML
4 INJECTION, SOLUTION INTRAVENOUS DAILY PRN
Status: DISCONTINUED | OUTPATIENT
Start: 2024-09-19 | End: 2024-09-19

## 2024-09-19 RX ORDER — FENTANYL CITRATE 50 UG/ML
INJECTION, SOLUTION INTRAMUSCULAR; INTRAVENOUS
Status: DISCONTINUED | OUTPATIENT
Start: 2024-09-19 | End: 2024-09-19

## 2024-09-19 RX ORDER — SODIUM CHLORIDE 9 MG/ML
INJECTION, SOLUTION INTRAVENOUS CONTINUOUS
Status: DISCONTINUED | OUTPATIENT
Start: 2024-09-19 | End: 2024-09-21

## 2024-09-19 RX ORDER — OXYCODONE HYDROCHLORIDE 5 MG/1
5 TABLET ORAL ONCE AS NEEDED
Status: DISCONTINUED | OUTPATIENT
Start: 2024-09-19 | End: 2024-09-19

## 2024-09-19 RX ORDER — EPHEDRINE SULFATE 50 MG/ML
INJECTION, SOLUTION INTRAVENOUS
Status: DISCONTINUED | OUTPATIENT
Start: 2024-09-19 | End: 2024-09-19

## 2024-09-19 RX ORDER — MEPERIDINE HYDROCHLORIDE 50 MG/ML
12.5 INJECTION INTRAMUSCULAR; INTRAVENOUS; SUBCUTANEOUS EVERY 10 MIN PRN
Status: ACTIVE | OUTPATIENT
Start: 2024-09-19 | End: 2024-09-20

## 2024-09-19 RX ORDER — DEXAMETHASONE SODIUM PHOSPHATE 4 MG/ML
INJECTION, SOLUTION INTRA-ARTICULAR; INTRALESIONAL; INTRAMUSCULAR; INTRAVENOUS; SOFT TISSUE
Status: DISCONTINUED | OUTPATIENT
Start: 2024-09-19 | End: 2024-09-19

## 2024-09-19 RX ORDER — GLUCAGON 1 MG
1 KIT INJECTION
Status: DISCONTINUED | OUTPATIENT
Start: 2024-09-19 | End: 2024-09-27 | Stop reason: HOSPADM

## 2024-09-19 RX ORDER — HYDROMORPHONE HYDROCHLORIDE 1 MG/ML
0.5 INJECTION, SOLUTION INTRAMUSCULAR; INTRAVENOUS; SUBCUTANEOUS EVERY 5 MIN PRN
Status: COMPLETED | OUTPATIENT
Start: 2024-09-19 | End: 2024-09-26

## 2024-09-19 RX ORDER — HYDROCODONE BITARTRATE AND ACETAMINOPHEN 5; 325 MG/1; MG/1
1 TABLET ORAL EVERY 4 HOURS PRN
Status: DISCONTINUED | OUTPATIENT
Start: 2024-09-19 | End: 2024-09-27 | Stop reason: HOSPADM

## 2024-09-19 RX ORDER — SODIUM CHLORIDE, SODIUM LACTATE, POTASSIUM CHLORIDE, CALCIUM CHLORIDE 600; 310; 30; 20 MG/100ML; MG/100ML; MG/100ML; MG/100ML
INJECTION, SOLUTION INTRAVENOUS CONTINUOUS
Status: DISCONTINUED | OUTPATIENT
Start: 2024-09-19 | End: 2024-09-19

## 2024-09-19 RX ORDER — SUCCINYLCHOLINE CHLORIDE 20 MG/ML
INJECTION INTRAMUSCULAR; INTRAVENOUS
Status: DISCONTINUED | OUTPATIENT
Start: 2024-09-19 | End: 2024-09-19

## 2024-09-19 RX ORDER — ONDANSETRON HYDROCHLORIDE 2 MG/ML
INJECTION, SOLUTION INTRAMUSCULAR; INTRAVENOUS
Status: DISCONTINUED | OUTPATIENT
Start: 2024-09-19 | End: 2024-09-19

## 2024-09-19 RX ORDER — LIDOCAINE HYDROCHLORIDE 20 MG/ML
INJECTION INTRAVENOUS
Status: DISCONTINUED | OUTPATIENT
Start: 2024-09-19 | End: 2024-09-19

## 2024-09-19 RX ORDER — MIDAZOLAM HYDROCHLORIDE 1 MG/ML
INJECTION INTRAMUSCULAR; INTRAVENOUS
Status: DISCONTINUED | OUTPATIENT
Start: 2024-09-19 | End: 2024-09-19

## 2024-09-19 RX ADMIN — LIDOCAINE HYDROCHLORIDE 50 MG: 20 INJECTION, SOLUTION INTRAVENOUS at 08:09

## 2024-09-19 RX ADMIN — SUCCINYLCHOLINE CHLORIDE 120 MG: 20 INJECTION, SOLUTION INTRAMUSCULAR; INTRAVENOUS at 08:09

## 2024-09-19 RX ADMIN — MIDAZOLAM HYDROCHLORIDE 0.5 MG: 1 INJECTION, SOLUTION INTRAMUSCULAR; INTRAVENOUS at 08:09

## 2024-09-19 RX ADMIN — HYDROCORTISONE SODIUM SUCCINATE 50 MG: 100 INJECTION, POWDER, FOR SOLUTION INTRAMUSCULAR; INTRAVENOUS at 06:09

## 2024-09-19 RX ADMIN — SODIUM CHLORIDE: 9 INJECTION, SOLUTION INTRAVENOUS at 06:09

## 2024-09-19 RX ADMIN — DEXAMETHASONE SODIUM PHOSPHATE 8 MG: 4 INJECTION, SOLUTION INTRAMUSCULAR; INTRAVENOUS at 08:09

## 2024-09-19 RX ADMIN — MIDODRINE HYDROCHLORIDE 10 MG: 5 TABLET ORAL at 02:09

## 2024-09-19 RX ADMIN — VANCOMYCIN HYDROCHLORIDE 750 MG: 750 INJECTION, POWDER, LYOPHILIZED, FOR SOLUTION INTRAVENOUS at 02:09

## 2024-09-19 RX ADMIN — EPHEDRINE SULFATE 15 MG: 50 INJECTION INTRAVENOUS at 08:09

## 2024-09-19 RX ADMIN — PROPOFOL 20 MG: 10 INJECTION, EMULSION INTRAVENOUS at 08:09

## 2024-09-19 RX ADMIN — SODIUM CHLORIDE, POTASSIUM CHLORIDE, SODIUM LACTATE AND CALCIUM CHLORIDE: 600; 310; 30; 20 INJECTION, SOLUTION INTRAVENOUS at 08:09

## 2024-09-19 RX ADMIN — ONDANSETRON 4 MG: 2 INJECTION INTRAMUSCULAR; INTRAVENOUS at 09:09

## 2024-09-19 RX ADMIN — PANTOPRAZOLE SODIUM 40 MG: 40 TABLET, DELAYED RELEASE ORAL at 09:09

## 2024-09-19 RX ADMIN — FENTANYL CITRATE 25 MCG: 50 INJECTION INTRAMUSCULAR; INTRAVENOUS at 08:09

## 2024-09-19 RX ADMIN — FOLIC ACID 1000 MCG: 1 TABLET ORAL at 10:09

## 2024-09-19 RX ADMIN — LIDOCAINE HYDROCHLORIDE 10 MG: 10 INJECTION, SOLUTION EPIDURAL; INFILTRATION; INTRACAUDAL; PERINEURAL at 07:09

## 2024-09-19 RX ADMIN — ETOMIDATE 10 MG: 2 INJECTION, SOLUTION INTRAVENOUS at 08:09

## 2024-09-19 RX ADMIN — MIDODRINE HYDROCHLORIDE 10 MG: 5 TABLET ORAL at 09:09

## 2024-09-19 RX ADMIN — PANTOPRAZOLE SODIUM 40 MG: 40 TABLET, DELAYED RELEASE ORAL at 10:09

## 2024-09-19 RX ADMIN — HYDROCORTISONE SODIUM SUCCINATE 50 MG: 100 INJECTION, POWDER, FOR SOLUTION INTRAMUSCULAR; INTRAVENOUS at 12:09

## 2024-09-19 RX ADMIN — SODIUM CHLORIDE, POTASSIUM CHLORIDE, SODIUM LACTATE AND CALCIUM CHLORIDE 125 ML/HR: 600; 310; 30; 20 INJECTION, SOLUTION INTRAVENOUS at 10:09

## 2024-09-19 RX ADMIN — HYDROCORTISONE SODIUM SUCCINATE 50 MG: 100 INJECTION, POWDER, FOR SOLUTION INTRAMUSCULAR; INTRAVENOUS at 11:09

## 2024-09-19 RX ADMIN — HYDROCORTISONE SODIUM SUCCINATE 50 MG: 100 INJECTION, POWDER, FOR SOLUTION INTRAMUSCULAR; INTRAVENOUS at 10:09

## 2024-09-19 RX ADMIN — MIDODRINE HYDROCHLORIDE 10 MG: 5 TABLET ORAL at 10:09

## 2024-09-19 RX ADMIN — CIPROFLOXACIN 400 MG: 2 INJECTION, SOLUTION INTRAVENOUS at 09:09

## 2024-09-19 RX ADMIN — ACETAMINOPHEN 1000 MG: 10 INJECTION INTRAVENOUS at 09:09

## 2024-09-19 RX ADMIN — CIPROFLOXACIN 400 MG: 2 INJECTION, SOLUTION INTRAVENOUS at 08:09

## 2024-09-19 NOTE — TRANSFER OF CARE
"Anesthesia Transfer of Care Note    Patient: Jennifer Hurst    Procedure(s) Performed: Procedure(s) (LRB):  DEBRIDEMENT, PRESSURE ULCER, SACRAL DECUBITIS (N/A)    Patient location: PACU    Anesthesia Type: general    Transport from OR: Transported from OR on room air with adequate spontaneous ventilation    Post pain: adequate analgesia    Post assessment: no apparent anesthetic complications    Post vital signs: stable    Level of consciousness: awake, alert and oriented    Nausea/Vomiting: no nausea/vomiting    Complications: none    Transfer of care protocol was followed      Last vitals: Visit Vitals  BP 98/67 (Patient Position: Lying)   Pulse 80   Temp 37.7 °C (99.8 °F) (Temporal)   Resp 14   Ht 5' 2" (1.575 m)   Wt 81 kg (178 lb 9.2 oz)   SpO2 100%   Breastfeeding No   BMI 32.66 kg/m²     "

## 2024-09-19 NOTE — ANESTHESIA PROCEDURE NOTES
Intubation    Date/Time: 9/19/2024 8:42 AM    Performed by: Lisa Corcoran CRNA  Authorized by: Lisa Corcoran CRNA    Intubation:     Induction:  Intravenous    Intubated:  Postinduction    Mask Ventilation:  Easy mask    Attempts:  1    Attempted By:  CRNA    Method of Intubation:  Video laryngoscopy    Blade:  Freed 3    Laryngeal View Grade: Grade I - full view of cords      Difficult Airway Encountered?: No      Complications:  None    Airway Device:  Oral endotracheal tube    Airway Device Size:  7.0    Style/Cuff Inflation:  Cuffed (inflated to minimal occlusive pressure)    Tube secured:  21    Secured at:  The lips    Placement Verified By:  Capnometry    Complicating Factors:  None    Findings Post-Intubation:  BS equal bilateral and atraumatic/condition of teeth unchanged

## 2024-09-19 NOTE — ANESTHESIA POSTPROCEDURE EVALUATION
Anesthesia Post Evaluation    Patient: Jennifer Hurst    Procedure(s) Performed: Procedure(s) (LRB):  DEBRIDEMENT, PRESSURE ULCER, SACRAL DECUBITIS (N/A)    [facility] Anesthesia Post Op Evaluation      Vitals Value Taken Time   /54 09/19/24 0927   Temp 97 09/19/24 0928   Pulse 88 09/19/24 0928   Resp 20 09/19/24 0928   SpO2 98 % 09/19/24 0928   Vitals shown include unfiled device data.      No case tracking events are documented in the log.      Pain/Gabriella Score: Pain Rating Prior to Med Admin: 4 (9/18/2024  8:54 AM)  Pain Rating Post Med Admin: 0 (9/18/2024  9:54 AM)  Gabriella Score: 9 (9/19/2024  9:23 AM)

## 2024-09-19 NOTE — ANESTHESIA POSTPROCEDURE EVALUATION
Anesthesia Post Evaluation    Patient: Jennifer Hurst    Procedure(s) Performed: Procedure(s) (LRB):  DEBRIDEMENT, PRESSURE ULCER, SACRAL DECUBITIS (N/A)    Final Anesthesia Type: general      Patient location during evaluation: PACU  Patient participation: Yes- Able to Participate  Level of consciousness: awake and alert and oriented  Post-procedure vital signs: reviewed and stable  Pain management: adequate  Airway patency: patent    PONV status at discharge: No PONV  Anesthetic complications: no      Cardiovascular status: blood pressure returned to baseline and hemodynamically stable  Respiratory status: spontaneous ventilation and room air  Hydration status: euvolemic  Follow-up not needed.            Vitals Value Taken Time   /54 09/19/24 0927   Temp 97 09/19/24 0929   Pulse 87 09/19/24 0929   Resp 16 09/19/24 0929   SpO2 98 % 09/19/24 0929   Vitals shown include unfiled device data.      No case tracking events are documented in the log.      Pain/Gabriella Score: Pain Rating Prior to Med Admin: 4 (9/18/2024  8:54 AM)  Pain Rating Post Med Admin: 0 (9/18/2024  9:54 AM)  Gabriella Score: 9 (9/19/2024  9:23 AM)

## 2024-09-20 LAB
ACTH PLAS-MCNC: 38 PG/ML (ref 0–46)
ALBUMIN SERPL BCP-MCNC: 2 G/DL (ref 3.5–5.2)
ALP SERPL-CCNC: 53 U/L (ref 55–135)
ALT SERPL W/O P-5'-P-CCNC: 13 U/L (ref 10–44)
ANION GAP SERPL CALC-SCNC: 12 MMOL/L (ref 8–16)
AST SERPL-CCNC: 40 U/L (ref 10–40)
BASOPHILS # BLD AUTO: 0.01 K/UL (ref 0–0.2)
BASOPHILS NFR BLD: 0.1 % (ref 0–1.9)
BILIRUB SERPL-MCNC: 1.9 MG/DL (ref 0.1–1)
BNP SERPL-MCNC: 702 PG/ML (ref 0–99)
BUN SERPL-MCNC: 28 MG/DL (ref 8–23)
CALCIUM SERPL-MCNC: 6.9 MG/DL (ref 8.7–10.5)
CHLORIDE SERPL-SCNC: 103 MMOL/L (ref 95–110)
CO2 SERPL-SCNC: 16 MMOL/L (ref 23–29)
CREAT SERPL-MCNC: 2.1 MG/DL (ref 0.5–1.4)
DIFFERENTIAL METHOD BLD: ABNORMAL
EOSINOPHIL # BLD AUTO: 0 K/UL (ref 0–0.5)
EOSINOPHIL NFR BLD: 0 % (ref 0–8)
ERYTHROCYTE [DISTWIDTH] IN BLOOD BY AUTOMATED COUNT: 24.9 % (ref 11.5–14.5)
EST. GFR  (NO RACE VARIABLE): 24.7 ML/MIN/1.73 M^2
GLUCOSE SERPL-MCNC: 133 MG/DL (ref 70–110)
HCT VFR BLD AUTO: 23.1 % (ref 37–48.5)
HGB BLD-MCNC: 7.9 G/DL (ref 12–16)
IMM GRANULOCYTES # BLD AUTO: 0.33 K/UL (ref 0–0.04)
IMM GRANULOCYTES NFR BLD AUTO: 2.8 % (ref 0–0.5)
LACTATE SERPL-SCNC: 1.9 MMOL/L (ref 0.5–2.2)
LYMPHOCYTES # BLD AUTO: 1 K/UL (ref 1–4.8)
LYMPHOCYTES NFR BLD: 8.5 % (ref 18–48)
MAGNESIUM SERPL-MCNC: 1.7 MG/DL (ref 1.6–2.6)
MCH RBC QN AUTO: 33.5 PG (ref 27–31)
MCHC RBC AUTO-ENTMCNC: 34.2 G/DL (ref 32–36)
MCV RBC AUTO: 98 FL (ref 82–98)
MONOCYTES # BLD AUTO: 0.5 K/UL (ref 0.3–1)
MONOCYTES NFR BLD: 4.3 % (ref 4–15)
NEUTROPHILS # BLD AUTO: 9.9 K/UL (ref 1.8–7.7)
NEUTROPHILS NFR BLD: 84.3 % (ref 38–73)
NRBC BLD-RTO: 1 /100 WBC
PHOSPHATE SERPL-MCNC: 4.6 MG/DL (ref 2.7–4.5)
PLATELET # BLD AUTO: 173 K/UL (ref 150–450)
PMV BLD AUTO: 10 FL (ref 9.2–12.9)
POTASSIUM SERPL-SCNC: 4 MMOL/L (ref 3.5–5.1)
PROT SERPL-MCNC: 5.5 G/DL (ref 6–8.4)
RBC # BLD AUTO: 2.36 M/UL (ref 4–5.4)
SODIUM SERPL-SCNC: 131 MMOL/L (ref 136–145)
VANCOMYCIN SERPL-MCNC: 21.7 UG/ML
WBC # BLD AUTO: 11.7 K/UL (ref 3.9–12.7)

## 2024-09-20 PROCEDURE — 94761 N-INVAS EAR/PLS OXIMETRY MLT: CPT

## 2024-09-20 PROCEDURE — 63600175 PHARM REV CODE 636 W HCPCS: Performed by: INTERNAL MEDICINE

## 2024-09-20 PROCEDURE — 80202 ASSAY OF VANCOMYCIN: CPT | Performed by: INTERNAL MEDICINE

## 2024-09-20 PROCEDURE — 25000003 PHARM REV CODE 250: Performed by: INTERNAL MEDICINE

## 2024-09-20 PROCEDURE — 80053 COMPREHEN METABOLIC PANEL: CPT | Performed by: SPECIALIST

## 2024-09-20 PROCEDURE — 63600175 PHARM REV CODE 636 W HCPCS: Performed by: FAMILY MEDICINE

## 2024-09-20 PROCEDURE — 51798 US URINE CAPACITY MEASURE: CPT

## 2024-09-20 PROCEDURE — 25000003 PHARM REV CODE 250: Performed by: SPECIALIST

## 2024-09-20 PROCEDURE — 85025 COMPLETE CBC W/AUTO DIFF WBC: CPT | Performed by: INTERNAL MEDICINE

## 2024-09-20 PROCEDURE — 83735 ASSAY OF MAGNESIUM: CPT | Performed by: SPECIALIST

## 2024-09-20 PROCEDURE — 97165 OT EVAL LOW COMPLEX 30 MIN: CPT

## 2024-09-20 PROCEDURE — 83880 ASSAY OF NATRIURETIC PEPTIDE: CPT | Performed by: INTERNAL MEDICINE

## 2024-09-20 PROCEDURE — 21400001 HC TELEMETRY ROOM

## 2024-09-20 PROCEDURE — 97162 PT EVAL MOD COMPLEX 30 MIN: CPT

## 2024-09-20 PROCEDURE — 63600175 PHARM REV CODE 636 W HCPCS: Mod: JZ,JG | Performed by: INTERNAL MEDICINE

## 2024-09-20 PROCEDURE — 84100 ASSAY OF PHOSPHORUS: CPT | Performed by: SPECIALIST

## 2024-09-20 PROCEDURE — 25000003 PHARM REV CODE 250: Performed by: FAMILY MEDICINE

## 2024-09-20 PROCEDURE — 63600175 PHARM REV CODE 636 W HCPCS: Performed by: SPECIALIST

## 2024-09-20 PROCEDURE — 99900035 HC TECH TIME PER 15 MIN (STAT)

## 2024-09-20 PROCEDURE — 11000001 HC ACUTE MED/SURG PRIVATE ROOM

## 2024-09-20 PROCEDURE — 83605 ASSAY OF LACTIC ACID: CPT | Performed by: INTERNAL MEDICINE

## 2024-09-20 RX ORDER — DOXYCYCLINE HYCLATE 100 MG
100 TABLET ORAL EVERY 12 HOURS
Status: DISCONTINUED | OUTPATIENT
Start: 2024-09-20 | End: 2024-09-26

## 2024-09-20 RX ORDER — MAGNESIUM SULFATE 1 G/100ML
1 INJECTION INTRAVENOUS ONCE
Status: COMPLETED | OUTPATIENT
Start: 2024-09-20 | End: 2024-09-20

## 2024-09-20 RX ADMIN — PANTOPRAZOLE SODIUM 40 MG: 40 TABLET, DELAYED RELEASE ORAL at 08:09

## 2024-09-20 RX ADMIN — MAGNESIUM SULFATE IN DEXTROSE 1 G: 10 INJECTION, SOLUTION INTRAVENOUS at 07:09

## 2024-09-20 RX ADMIN — FOLIC ACID 1000 MCG: 1 TABLET ORAL at 09:09

## 2024-09-20 RX ADMIN — MIDODRINE HYDROCHLORIDE 10 MG: 5 TABLET ORAL at 03:09

## 2024-09-20 RX ADMIN — HYDROCORTISONE SODIUM SUCCINATE 50 MG: 100 INJECTION, POWDER, FOR SOLUTION INTRAMUSCULAR; INTRAVENOUS at 05:09

## 2024-09-20 RX ADMIN — DOXYCYCLINE HYCLATE 100 MG: 100 TABLET, COATED ORAL at 08:09

## 2024-09-20 RX ADMIN — SODIUM CHLORIDE: 9 INJECTION, SOLUTION INTRAVENOUS at 07:09

## 2024-09-20 RX ADMIN — LEVOTHYROXINE SODIUM 137 MCG: 25 TABLET ORAL at 08:09

## 2024-09-20 RX ADMIN — PANTOPRAZOLE SODIUM 40 MG: 40 TABLET, DELAYED RELEASE ORAL at 09:09

## 2024-09-20 RX ADMIN — CEFTRIAXONE SODIUM 2 G: 2 INJECTION, POWDER, FOR SOLUTION INTRAMUSCULAR; INTRAVENOUS at 05:09

## 2024-09-20 RX ADMIN — HYDROCORTISONE SODIUM SUCCINATE 50 MG: 100 INJECTION, POWDER, FOR SOLUTION INTRAMUSCULAR; INTRAVENOUS at 11:09

## 2024-09-20 RX ADMIN — MIDODRINE HYDROCHLORIDE 10 MG: 5 TABLET ORAL at 08:09

## 2024-09-20 RX ADMIN — HYDROCODONE BITARTRATE AND ACETAMINOPHEN 1 TABLET: 5; 325 TABLET ORAL at 04:09

## 2024-09-20 RX ADMIN — HYDROCORTISONE SODIUM SUCCINATE 50 MG: 100 INJECTION, POWDER, FOR SOLUTION INTRAMUSCULAR; INTRAVENOUS at 06:09

## 2024-09-20 RX ADMIN — CALCIUM GLUCONATE 1 G: 98 INJECTION, SOLUTION INTRAVENOUS at 06:09

## 2024-09-20 RX ADMIN — CIPROFLOXACIN 400 MG: 2 INJECTION, SOLUTION INTRAVENOUS at 09:09

## 2024-09-20 RX ADMIN — MIDODRINE HYDROCHLORIDE 10 MG: 5 TABLET ORAL at 09:09

## 2024-09-20 RX ADMIN — ONDANSETRON 4 MG: 2 INJECTION INTRAMUSCULAR; INTRAVENOUS at 06:09

## 2024-09-20 RX ADMIN — HYDROMORPHONE HYDROCHLORIDE 0.5 MG: 1 INJECTION, SOLUTION INTRAMUSCULAR; INTRAVENOUS; SUBCUTANEOUS at 05:09

## 2024-09-20 RX ADMIN — SODIUM CHLORIDE: 9 INJECTION, SOLUTION INTRAVENOUS at 02:09

## 2024-09-21 LAB
ALBUMIN SERPL BCP-MCNC: 2 G/DL (ref 3.5–5.2)
ALBUMIN SERPL BCP-MCNC: 2.2 G/DL (ref 3.5–5.2)
ALLENS TEST: ABNORMAL
ALP SERPL-CCNC: 55 U/L (ref 55–135)
ALP SERPL-CCNC: 57 U/L (ref 55–135)
ALT SERPL W/O P-5'-P-CCNC: 16 U/L (ref 10–44)
ALT SERPL W/O P-5'-P-CCNC: 19 U/L (ref 10–44)
ANION GAP SERPL CALC-SCNC: 10 MMOL/L (ref 8–16)
ANION GAP SERPL CALC-SCNC: 14 MMOL/L (ref 8–16)
AST SERPL-CCNC: 53 U/L (ref 10–40)
AST SERPL-CCNC: 62 U/L (ref 10–40)
BASOPHILS # BLD AUTO: 0.01 K/UL (ref 0–0.2)
BASOPHILS NFR BLD: 0.1 % (ref 0–1.9)
BILIRUB SERPL-MCNC: 1.7 MG/DL (ref 0.1–1)
BILIRUB SERPL-MCNC: 2 MG/DL (ref 0.1–1)
BUN SERPL-MCNC: 29 MG/DL (ref 8–23)
BUN SERPL-MCNC: 31 MG/DL (ref 8–23)
CALCIUM SERPL-MCNC: 6.7 MG/DL (ref 8.7–10.5)
CALCIUM SERPL-MCNC: 7.3 MG/DL (ref 8.7–10.5)
CHLORIDE SERPL-SCNC: 103 MMOL/L (ref 95–110)
CHLORIDE SERPL-SCNC: 105 MMOL/L (ref 95–110)
CO2 SERPL-SCNC: 15 MMOL/L (ref 23–29)
CO2 SERPL-SCNC: 15 MMOL/L (ref 23–29)
CREAT SERPL-MCNC: 2.1 MG/DL (ref 0.5–1.4)
CREAT SERPL-MCNC: 2.4 MG/DL (ref 0.5–1.4)
DELSYS: ABNORMAL
DIFFERENTIAL METHOD BLD: ABNORMAL
EOSINOPHIL # BLD AUTO: 0 K/UL (ref 0–0.5)
EOSINOPHIL NFR BLD: 0 % (ref 0–8)
ERYTHROCYTE [DISTWIDTH] IN BLOOD BY AUTOMATED COUNT: 25.2 % (ref 11.5–14.5)
EST. GFR  (NO RACE VARIABLE): 21.1 ML/MIN/1.73 M^2
EST. GFR  (NO RACE VARIABLE): 24.7 ML/MIN/1.73 M^2
FIO2: 21
GLUCOSE SERPL-MCNC: 117 MG/DL (ref 70–110)
GLUCOSE SERPL-MCNC: 124 MG/DL (ref 70–110)
HCO3 UR-SCNC: 17.6 MMOL/L (ref 24–28)
HCT VFR BLD AUTO: 23.5 % (ref 37–48.5)
HGB BLD-MCNC: 7.9 G/DL (ref 12–16)
IMM GRANULOCYTES # BLD AUTO: 0.35 K/UL (ref 0–0.04)
IMM GRANULOCYTES NFR BLD AUTO: 3 % (ref 0–0.5)
LYMPHOCYTES # BLD AUTO: 0.9 K/UL (ref 1–4.8)
LYMPHOCYTES NFR BLD: 7.9 % (ref 18–48)
MAGNESIUM SERPL-MCNC: 1.9 MG/DL (ref 1.6–2.6)
MCH RBC QN AUTO: 33.6 PG (ref 27–31)
MCHC RBC AUTO-ENTMCNC: 33.6 G/DL (ref 32–36)
MCV RBC AUTO: 100 FL (ref 82–98)
MODE: ABNORMAL
MONOCYTES # BLD AUTO: 0.6 K/UL (ref 0.3–1)
MONOCYTES NFR BLD: 4.8 % (ref 4–15)
NEUTROPHILS # BLD AUTO: 9.7 K/UL (ref 1.8–7.7)
NEUTROPHILS NFR BLD: 84.2 % (ref 38–73)
NRBC BLD-RTO: 1 /100 WBC
PCO2 BLDA: 34 MMHG (ref 35–45)
PH SMN: 7.32 [PH] (ref 7.35–7.45)
PHOSPHATE SERPL-MCNC: 5.1 MG/DL (ref 2.7–4.5)
PLATELET # BLD AUTO: 174 K/UL (ref 150–450)
PMV BLD AUTO: 9.7 FL (ref 9.2–12.9)
PO2 BLDA: 32 MMHG (ref 40–60)
POC BE: -8 MMOL/L
POC SATURATED O2: 57 % (ref 95–100)
POC TCO2: 19 MMOL/L (ref 24–29)
POTASSIUM SERPL-SCNC: 3.9 MMOL/L (ref 3.5–5.1)
POTASSIUM SERPL-SCNC: 4.2 MMOL/L (ref 3.5–5.1)
PROT SERPL-MCNC: 5.5 G/DL (ref 6–8.4)
PROT SERPL-MCNC: 5.8 G/DL (ref 6–8.4)
RBC # BLD AUTO: 2.35 M/UL (ref 4–5.4)
SAMPLE: ABNORMAL
SITE: ABNORMAL
SODIUM SERPL-SCNC: 130 MMOL/L (ref 136–145)
SODIUM SERPL-SCNC: 132 MMOL/L (ref 136–145)
SP02: 100
VERBAL RESULT READBACK PERFORMED: NO
WBC # BLD AUTO: 11.57 K/UL (ref 3.9–12.7)

## 2024-09-21 PROCEDURE — 94761 N-INVAS EAR/PLS OXIMETRY MLT: CPT

## 2024-09-21 PROCEDURE — 63600175 PHARM REV CODE 636 W HCPCS: Performed by: INTERNAL MEDICINE

## 2024-09-21 PROCEDURE — 25000003 PHARM REV CODE 250: Performed by: INTERNAL MEDICINE

## 2024-09-21 PROCEDURE — 63600175 PHARM REV CODE 636 W HCPCS: Performed by: SPECIALIST

## 2024-09-21 PROCEDURE — 80053 COMPREHEN METABOLIC PANEL: CPT | Mod: 91 | Performed by: INTERNAL MEDICINE

## 2024-09-21 PROCEDURE — 83735 ASSAY OF MAGNESIUM: CPT | Performed by: SPECIALIST

## 2024-09-21 PROCEDURE — 11000001 HC ACUTE MED/SURG PRIVATE ROOM

## 2024-09-21 PROCEDURE — 25000003 PHARM REV CODE 250: Performed by: SPECIALIST

## 2024-09-21 PROCEDURE — 36415 COLL VENOUS BLD VENIPUNCTURE: CPT | Performed by: INTERNAL MEDICINE

## 2024-09-21 PROCEDURE — 63600175 PHARM REV CODE 636 W HCPCS: Mod: JZ,JG | Performed by: INTERNAL MEDICINE

## 2024-09-21 PROCEDURE — 80053 COMPREHEN METABOLIC PANEL: CPT | Performed by: SPECIALIST

## 2024-09-21 PROCEDURE — 25000003 PHARM REV CODE 250: Performed by: FAMILY MEDICINE

## 2024-09-21 PROCEDURE — 99900035 HC TECH TIME PER 15 MIN (STAT)

## 2024-09-21 PROCEDURE — 21400001 HC TELEMETRY ROOM

## 2024-09-21 PROCEDURE — 84100 ASSAY OF PHOSPHORUS: CPT | Performed by: SPECIALIST

## 2024-09-21 PROCEDURE — 85025 COMPLETE CBC W/AUTO DIFF WBC: CPT | Performed by: INTERNAL MEDICINE

## 2024-09-21 RX ORDER — MUPIROCIN 20 MG/G
OINTMENT TOPICAL 2 TIMES DAILY
Status: CANCELLED | OUTPATIENT
Start: 2024-09-21 | End: 2024-09-26

## 2024-09-21 RX ORDER — FUROSEMIDE 10 MG/ML
20 INJECTION INTRAMUSCULAR; INTRAVENOUS ONCE
Status: COMPLETED | OUTPATIENT
Start: 2024-09-21 | End: 2024-09-21

## 2024-09-21 RX ADMIN — LEVOTHYROXINE SODIUM 137 MCG: 25 TABLET ORAL at 05:09

## 2024-09-21 RX ADMIN — CEFEPIME 1 G: 1 INJECTION, POWDER, FOR SOLUTION INTRAMUSCULAR; INTRAVENOUS at 02:09

## 2024-09-21 RX ADMIN — DOXYCYCLINE HYCLATE 100 MG: 100 TABLET, COATED ORAL at 09:09

## 2024-09-21 RX ADMIN — HYDROCORTISONE SODIUM SUCCINATE 50 MG: 100 INJECTION, POWDER, FOR SOLUTION INTRAMUSCULAR; INTRAVENOUS at 06:09

## 2024-09-21 RX ADMIN — HYDROCODONE BITARTRATE AND ACETAMINOPHEN 1 TABLET: 5; 325 TABLET ORAL at 09:09

## 2024-09-21 RX ADMIN — HYDROCORTISONE SODIUM SUCCINATE 50 MG: 100 INJECTION, POWDER, FOR SOLUTION INTRAMUSCULAR; INTRAVENOUS at 12:09

## 2024-09-21 RX ADMIN — PANTOPRAZOLE SODIUM 40 MG: 40 TABLET, DELAYED RELEASE ORAL at 08:09

## 2024-09-21 RX ADMIN — CALCIUM GLUCONATE 1 G: 98 INJECTION, SOLUTION INTRAVENOUS at 05:09

## 2024-09-21 RX ADMIN — SODIUM CHLORIDE: 9 INJECTION, SOLUTION INTRAVENOUS at 09:09

## 2024-09-21 RX ADMIN — MIDODRINE HYDROCHLORIDE 10 MG: 5 TABLET ORAL at 08:09

## 2024-09-21 RX ADMIN — FUROSEMIDE 20 MG: 10 INJECTION, SOLUTION INTRAVENOUS at 02:09

## 2024-09-21 RX ADMIN — MIDODRINE HYDROCHLORIDE 10 MG: 5 TABLET ORAL at 02:09

## 2024-09-21 RX ADMIN — HYDROCORTISONE SODIUM SUCCINATE 50 MG: 100 INJECTION, POWDER, FOR SOLUTION INTRAMUSCULAR; INTRAVENOUS at 01:09

## 2024-09-21 RX ADMIN — MIDODRINE HYDROCHLORIDE 10 MG: 5 TABLET ORAL at 09:09

## 2024-09-21 RX ADMIN — DOXYCYCLINE HYCLATE 100 MG: 100 TABLET, COATED ORAL at 08:09

## 2024-09-21 RX ADMIN — FOLIC ACID 1000 MCG: 1 TABLET ORAL at 08:09

## 2024-09-21 RX ADMIN — HYDROCODONE BITARTRATE AND ACETAMINOPHEN 1 TABLET: 5; 325 TABLET ORAL at 02:09

## 2024-09-21 RX ADMIN — PANTOPRAZOLE SODIUM 40 MG: 40 TABLET, DELAYED RELEASE ORAL at 09:09

## 2024-09-21 RX ADMIN — HYDROCORTISONE SODIUM SUCCINATE 50 MG: 100 INJECTION, POWDER, FOR SOLUTION INTRAMUSCULAR; INTRAVENOUS at 05:09

## 2024-09-22 LAB
ALBUMIN SERPL BCP-MCNC: 2.1 G/DL (ref 3.5–5.2)
ALP SERPL-CCNC: 51 U/L (ref 55–135)
ALT SERPL W/O P-5'-P-CCNC: 17 U/L (ref 10–44)
ANION GAP SERPL CALC-SCNC: 11 MMOL/L (ref 8–16)
ANION GAP SERPL CALC-SCNC: 13 MMOL/L (ref 8–16)
AST SERPL-CCNC: 61 U/L (ref 10–40)
BACTERIA UR CULT: ABNORMAL
BASOPHILS # BLD AUTO: 0.01 K/UL (ref 0–0.2)
BASOPHILS NFR BLD: 0.1 % (ref 0–1.9)
BILIRUB SERPL-MCNC: 1.7 MG/DL (ref 0.1–1)
BNP SERPL-MCNC: 1736 PG/ML (ref 0–99)
BUN SERPL-MCNC: 33 MG/DL (ref 8–23)
BUN SERPL-MCNC: 34 MG/DL (ref 8–23)
CALCIUM SERPL-MCNC: 7.1 MG/DL (ref 8.7–10.5)
CALCIUM SERPL-MCNC: 7.1 MG/DL (ref 8.7–10.5)
CHLORIDE SERPL-SCNC: 104 MMOL/L (ref 95–110)
CHLORIDE SERPL-SCNC: 105 MMOL/L (ref 95–110)
CO2 SERPL-SCNC: 14 MMOL/L (ref 23–29)
CO2 SERPL-SCNC: 14 MMOL/L (ref 23–29)
CREAT SERPL-MCNC: 2.5 MG/DL (ref 0.5–1.4)
CREAT SERPL-MCNC: 2.6 MG/DL (ref 0.5–1.4)
DIFFERENTIAL METHOD BLD: ABNORMAL
EOSINOPHIL # BLD AUTO: 0 K/UL (ref 0–0.5)
EOSINOPHIL NFR BLD: 0.1 % (ref 0–8)
ERYTHROCYTE [DISTWIDTH] IN BLOOD BY AUTOMATED COUNT: 25 % (ref 11.5–14.5)
EST. GFR  (NO RACE VARIABLE): 19.1 ML/MIN/1.73 M^2
EST. GFR  (NO RACE VARIABLE): 20.1 ML/MIN/1.73 M^2
GLUCOSE SERPL-MCNC: 111 MG/DL (ref 70–110)
GLUCOSE SERPL-MCNC: 114 MG/DL (ref 70–110)
HCT VFR BLD AUTO: 25.8 % (ref 37–48.5)
HGB BLD-MCNC: 8.7 G/DL (ref 12–16)
IMM GRANULOCYTES # BLD AUTO: 0.3 K/UL (ref 0–0.04)
IMM GRANULOCYTES NFR BLD AUTO: 2.7 % (ref 0–0.5)
LYMPHOCYTES # BLD AUTO: 1 K/UL (ref 1–4.8)
LYMPHOCYTES NFR BLD: 9.1 % (ref 18–48)
MAGNESIUM SERPL-MCNC: 1.8 MG/DL (ref 1.6–2.6)
MCH RBC QN AUTO: 33.7 PG (ref 27–31)
MCHC RBC AUTO-ENTMCNC: 33.7 G/DL (ref 32–36)
MCV RBC AUTO: 100 FL (ref 82–98)
MONOCYTES # BLD AUTO: 0.8 K/UL (ref 0.3–1)
MONOCYTES NFR BLD: 6.8 % (ref 4–15)
NEUTROPHILS # BLD AUTO: 8.9 K/UL (ref 1.8–7.7)
NEUTROPHILS NFR BLD: 81.2 % (ref 38–73)
NRBC BLD-RTO: 1 /100 WBC
PLATELET # BLD AUTO: 157 K/UL (ref 150–450)
PMV BLD AUTO: 9.3 FL (ref 9.2–12.9)
POTASSIUM SERPL-SCNC: 3.9 MMOL/L (ref 3.5–5.1)
POTASSIUM SERPL-SCNC: 4 MMOL/L (ref 3.5–5.1)
PROT SERPL-MCNC: 5.4 G/DL (ref 6–8.4)
RBC # BLD AUTO: 2.58 M/UL (ref 4–5.4)
SODIUM SERPL-SCNC: 130 MMOL/L (ref 136–145)
SODIUM SERPL-SCNC: 131 MMOL/L (ref 136–145)
TROPONIN I SERPL DL<=0.01 NG/ML-MCNC: 0.02 NG/ML (ref 0–0.03)
WBC # BLD AUTO: 10.99 K/UL (ref 3.9–12.7)

## 2024-09-22 PROCEDURE — 99900035 HC TECH TIME PER 15 MIN (STAT)

## 2024-09-22 PROCEDURE — 25000003 PHARM REV CODE 250: Performed by: SPECIALIST

## 2024-09-22 PROCEDURE — 63600175 PHARM REV CODE 636 W HCPCS

## 2024-09-22 PROCEDURE — 85025 COMPLETE CBC W/AUTO DIFF WBC: CPT | Performed by: INTERNAL MEDICINE

## 2024-09-22 PROCEDURE — 80053 COMPREHEN METABOLIC PANEL: CPT | Performed by: INTERNAL MEDICINE

## 2024-09-22 PROCEDURE — 36415 COLL VENOUS BLD VENIPUNCTURE: CPT | Performed by: INTERNAL MEDICINE

## 2024-09-22 PROCEDURE — 84484 ASSAY OF TROPONIN QUANT: CPT | Performed by: INTERNAL MEDICINE

## 2024-09-22 PROCEDURE — 83880 ASSAY OF NATRIURETIC PEPTIDE: CPT | Performed by: INTERNAL MEDICINE

## 2024-09-22 PROCEDURE — 63600175 PHARM REV CODE 636 W HCPCS: Performed by: FAMILY MEDICINE

## 2024-09-22 PROCEDURE — 80048 BASIC METABOLIC PNL TOTAL CA: CPT | Performed by: INTERNAL MEDICINE

## 2024-09-22 PROCEDURE — 21400001 HC TELEMETRY ROOM

## 2024-09-22 PROCEDURE — 25000003 PHARM REV CODE 250: Performed by: INTERNAL MEDICINE

## 2024-09-22 PROCEDURE — 25000003 PHARM REV CODE 250: Performed by: FAMILY MEDICINE

## 2024-09-22 PROCEDURE — 83735 ASSAY OF MAGNESIUM: CPT | Performed by: INTERNAL MEDICINE

## 2024-09-22 PROCEDURE — 94799 UNLISTED PULMONARY SVC/PX: CPT

## 2024-09-22 PROCEDURE — 63600175 PHARM REV CODE 636 W HCPCS: Performed by: INTERNAL MEDICINE

## 2024-09-22 PROCEDURE — 94761 N-INVAS EAR/PLS OXIMETRY MLT: CPT

## 2024-09-22 PROCEDURE — 63600175 PHARM REV CODE 636 W HCPCS: Performed by: SPECIALIST

## 2024-09-22 RX ORDER — MIDODRINE HYDROCHLORIDE 5 MG/1
5 TABLET ORAL ONCE
Status: COMPLETED | OUTPATIENT
Start: 2024-09-22 | End: 2024-09-22

## 2024-09-22 RX ORDER — MUPIROCIN 20 MG/G
OINTMENT TOPICAL 2 TIMES DAILY
Status: DISCONTINUED | OUTPATIENT
Start: 2024-09-22 | End: 2024-09-27 | Stop reason: HOSPADM

## 2024-09-22 RX ORDER — ADENOSINE 3 MG/ML
INJECTION, SOLUTION INTRAVENOUS
Status: COMPLETED
Start: 2024-09-22 | End: 2024-09-22

## 2024-09-22 RX ORDER — METOPROLOL TARTRATE 1 MG/ML
INJECTION, SOLUTION INTRAVENOUS
Status: DISPENSED
Start: 2024-09-22 | End: 2024-09-22

## 2024-09-22 RX ORDER — FUROSEMIDE 20 MG/1
20 TABLET ORAL ONCE
Status: COMPLETED | OUTPATIENT
Start: 2024-09-22 | End: 2024-09-22

## 2024-09-22 RX ORDER — FUROSEMIDE 10 MG/ML
40 INJECTION INTRAMUSCULAR; INTRAVENOUS DAILY
Status: DISCONTINUED | OUTPATIENT
Start: 2024-09-22 | End: 2024-09-23

## 2024-09-22 RX ADMIN — MIDODRINE HYDROCHLORIDE 10 MG: 5 TABLET ORAL at 08:09

## 2024-09-22 RX ADMIN — CEFEPIME 1 G: 1 INJECTION, POWDER, FOR SOLUTION INTRAMUSCULAR; INTRAVENOUS at 03:09

## 2024-09-22 RX ADMIN — PANTOPRAZOLE SODIUM 40 MG: 40 TABLET, DELAYED RELEASE ORAL at 08:09

## 2024-09-22 RX ADMIN — MUPIROCIN: 20 OINTMENT TOPICAL at 09:09

## 2024-09-22 RX ADMIN — DOXYCYCLINE HYCLATE 100 MG: 100 TABLET, COATED ORAL at 08:09

## 2024-09-22 RX ADMIN — HYDROCORTISONE SODIUM SUCCINATE 50 MG: 100 INJECTION, POWDER, FOR SOLUTION INTRAMUSCULAR; INTRAVENOUS at 05:09

## 2024-09-22 RX ADMIN — LEVOTHYROXINE SODIUM 137 MCG: 25 TABLET ORAL at 05:09

## 2024-09-22 RX ADMIN — MORPHINE SULFATE 2 MG: 2 INJECTION, SOLUTION INTRAMUSCULAR; INTRAVENOUS at 03:09

## 2024-09-22 RX ADMIN — HYDROCORTISONE SODIUM SUCCINATE 50 MG: 100 INJECTION, POWDER, FOR SOLUTION INTRAMUSCULAR; INTRAVENOUS at 01:09

## 2024-09-22 RX ADMIN — CEFTRIAXONE SODIUM 2 G: 2 INJECTION, POWDER, FOR SOLUTION INTRAMUSCULAR; INTRAVENOUS at 01:09

## 2024-09-22 RX ADMIN — MIDODRINE HYDROCHLORIDE 5 MG: 5 TABLET ORAL at 01:09

## 2024-09-22 RX ADMIN — FOLIC ACID 1000 MCG: 1 TABLET ORAL at 08:09

## 2024-09-22 RX ADMIN — HYDROCORTISONE SODIUM SUCCINATE 50 MG: 100 INJECTION, POWDER, FOR SOLUTION INTRAMUSCULAR; INTRAVENOUS at 12:09

## 2024-09-22 RX ADMIN — FUROSEMIDE 20 MG: 20 TABLET ORAL at 06:09

## 2024-09-22 RX ADMIN — HYDROCORTISONE SODIUM SUCCINATE 50 MG: 100 INJECTION, POWDER, FOR SOLUTION INTRAMUSCULAR; INTRAVENOUS at 06:09

## 2024-09-22 RX ADMIN — ADENOSINE 6 MG: 3 INJECTION INTRAVENOUS at 12:09

## 2024-09-22 RX ADMIN — FUROSEMIDE 40 MG: 10 INJECTION, SOLUTION INTRAVENOUS at 01:09

## 2024-09-22 RX ADMIN — MIDODRINE HYDROCHLORIDE 10 MG: 5 TABLET ORAL at 01:09

## 2024-09-23 PROBLEM — I50.21 ACUTE HFREF (HEART FAILURE WITH REDUCED EJECTION FRACTION): Status: ACTIVE | Noted: 2024-09-23

## 2024-09-23 PROBLEM — N18.4 CKD (CHRONIC KIDNEY DISEASE), STAGE IV: Status: RESOLVED | Noted: 2024-08-26 | Resolved: 2024-09-23

## 2024-09-23 PROBLEM — N18.9 ACUTE KIDNEY INJURY SUPERIMPOSED ON CHRONIC KIDNEY DISEASE: Status: ACTIVE | Noted: 2021-09-09

## 2024-09-23 LAB
ALBUMIN SERPL BCP-MCNC: 2.1 G/DL (ref 3.5–5.2)
ALP SERPL-CCNC: 52 U/L (ref 55–135)
ALT SERPL W/O P-5'-P-CCNC: 19 U/L (ref 10–44)
ANION GAP SERPL CALC-SCNC: 11 MMOL/L (ref 8–16)
AST SERPL-CCNC: 62 U/L (ref 10–40)
BACTERIA BLD CULT: NORMAL
BACTERIA BLD CULT: NORMAL
BASOPHILS # BLD AUTO: 0.01 K/UL (ref 0–0.2)
BASOPHILS NFR BLD: 0.1 % (ref 0–1.9)
BILIRUB SERPL-MCNC: 1.6 MG/DL (ref 0.1–1)
BNP SERPL-MCNC: 1361 PG/ML (ref 0–99)
BUN SERPL-MCNC: 37 MG/DL (ref 8–23)
CALCIUM SERPL-MCNC: 7.2 MG/DL (ref 8.7–10.5)
CHLORIDE SERPL-SCNC: 104 MMOL/L (ref 95–110)
CO2 SERPL-SCNC: 15 MMOL/L (ref 23–29)
CREAT SERPL-MCNC: 2.7 MG/DL (ref 0.5–1.4)
DIFFERENTIAL METHOD BLD: ABNORMAL
EOSINOPHIL # BLD AUTO: 0 K/UL (ref 0–0.5)
EOSINOPHIL NFR BLD: 0 % (ref 0–8)
ERYTHROCYTE [DISTWIDTH] IN BLOOD BY AUTOMATED COUNT: 24.6 % (ref 11.5–14.5)
EST. GFR  (NO RACE VARIABLE): 18.3 ML/MIN/1.73 M^2
GLUCOSE SERPL-MCNC: 117 MG/DL (ref 70–110)
HCT VFR BLD AUTO: 23.6 % (ref 37–48.5)
HGB BLD-MCNC: 8.1 G/DL (ref 12–16)
IMM GRANULOCYTES # BLD AUTO: 0.23 K/UL (ref 0–0.04)
IMM GRANULOCYTES NFR BLD AUTO: 2.6 % (ref 0–0.5)
LYMPHOCYTES # BLD AUTO: 0.6 K/UL (ref 1–4.8)
LYMPHOCYTES NFR BLD: 7 % (ref 18–48)
MAGNESIUM SERPL-MCNC: 1.7 MG/DL (ref 1.6–2.6)
MCH RBC QN AUTO: 33.9 PG (ref 27–31)
MCHC RBC AUTO-ENTMCNC: 34.3 G/DL (ref 32–36)
MCV RBC AUTO: 99 FL (ref 82–98)
MONOCYTES # BLD AUTO: 0.6 K/UL (ref 0.3–1)
MONOCYTES NFR BLD: 6.3 % (ref 4–15)
NEUTROPHILS # BLD AUTO: 7.4 K/UL (ref 1.8–7.7)
NEUTROPHILS NFR BLD: 84 % (ref 38–73)
NRBC BLD-RTO: 1 /100 WBC
OHS QRS DURATION: 68 MS
OHS QRS DURATION: 68 MS
OHS QTC CALCULATION: 418 MS
OHS QTC CALCULATION: 477 MS
PHOSPHATE SERPL-MCNC: 6 MG/DL (ref 2.7–4.5)
PLATELET # BLD AUTO: 106 K/UL (ref 150–450)
PMV BLD AUTO: 9.4 FL (ref 9.2–12.9)
POTASSIUM SERPL-SCNC: 3.7 MMOL/L (ref 3.5–5.1)
PROT SERPL-MCNC: 5.3 G/DL (ref 6–8.4)
RBC # BLD AUTO: 2.39 M/UL (ref 4–5.4)
SODIUM SERPL-SCNC: 130 MMOL/L (ref 136–145)
WBC # BLD AUTO: 8.83 K/UL (ref 3.9–12.7)

## 2024-09-23 PROCEDURE — 99900035 HC TECH TIME PER 15 MIN (STAT)

## 2024-09-23 PROCEDURE — 85025 COMPLETE CBC W/AUTO DIFF WBC: CPT | Performed by: INTERNAL MEDICINE

## 2024-09-23 PROCEDURE — 25000003 PHARM REV CODE 250: Performed by: INTERNAL MEDICINE

## 2024-09-23 PROCEDURE — 83735 ASSAY OF MAGNESIUM: CPT | Performed by: STUDENT IN AN ORGANIZED HEALTH CARE EDUCATION/TRAINING PROGRAM

## 2024-09-23 PROCEDURE — 63600175 PHARM REV CODE 636 W HCPCS: Performed by: SPECIALIST

## 2024-09-23 PROCEDURE — 84100 ASSAY OF PHOSPHORUS: CPT | Performed by: STUDENT IN AN ORGANIZED HEALTH CARE EDUCATION/TRAINING PROGRAM

## 2024-09-23 PROCEDURE — 94799 UNLISTED PULMONARY SVC/PX: CPT

## 2024-09-23 PROCEDURE — 99233 SBSQ HOSP IP/OBS HIGH 50: CPT | Mod: ,,, | Performed by: STUDENT IN AN ORGANIZED HEALTH CARE EDUCATION/TRAINING PROGRAM

## 2024-09-23 PROCEDURE — 30200315 PPD INTRADERMAL TEST REV CODE 302: Performed by: STUDENT IN AN ORGANIZED HEALTH CARE EDUCATION/TRAINING PROGRAM

## 2024-09-23 PROCEDURE — 83880 ASSAY OF NATRIURETIC PEPTIDE: CPT | Performed by: INTERNAL MEDICINE

## 2024-09-23 PROCEDURE — 36415 COLL VENOUS BLD VENIPUNCTURE: CPT | Performed by: INTERNAL MEDICINE

## 2024-09-23 PROCEDURE — 99231 SBSQ HOSP IP/OBS SF/LOW 25: CPT | Mod: ,,, | Performed by: SPECIALIST

## 2024-09-23 PROCEDURE — 86580 TB INTRADERMAL TEST: CPT | Performed by: STUDENT IN AN ORGANIZED HEALTH CARE EDUCATION/TRAINING PROGRAM

## 2024-09-23 PROCEDURE — 25000003 PHARM REV CODE 250: Performed by: FAMILY MEDICINE

## 2024-09-23 PROCEDURE — 25000003 PHARM REV CODE 250: Performed by: STUDENT IN AN ORGANIZED HEALTH CARE EDUCATION/TRAINING PROGRAM

## 2024-09-23 PROCEDURE — 80053 COMPREHEN METABOLIC PANEL: CPT | Performed by: STUDENT IN AN ORGANIZED HEALTH CARE EDUCATION/TRAINING PROGRAM

## 2024-09-23 PROCEDURE — 63600175 PHARM REV CODE 636 W HCPCS: Performed by: STUDENT IN AN ORGANIZED HEALTH CARE EDUCATION/TRAINING PROGRAM

## 2024-09-23 PROCEDURE — 21400001 HC TELEMETRY ROOM

## 2024-09-23 PROCEDURE — 94761 N-INVAS EAR/PLS OXIMETRY MLT: CPT

## 2024-09-23 RX ORDER — SEVELAMER CARBONATE 800 MG/1
800 TABLET, FILM COATED ORAL
Status: DISCONTINUED | OUTPATIENT
Start: 2024-09-23 | End: 2024-09-27 | Stop reason: HOSPADM

## 2024-09-23 RX ORDER — SODIUM BICARBONATE 650 MG/1
650 TABLET ORAL 2 TIMES DAILY
Status: DISCONTINUED | OUTPATIENT
Start: 2024-09-23 | End: 2024-09-27 | Stop reason: HOSPADM

## 2024-09-23 RX ORDER — FUROSEMIDE 10 MG/ML
40 INJECTION INTRAMUSCULAR; INTRAVENOUS EVERY 12 HOURS
Status: DISCONTINUED | OUTPATIENT
Start: 2024-09-23 | End: 2024-09-24

## 2024-09-23 RX ADMIN — MIDODRINE HYDROCHLORIDE 7.5 MG: 2.5 TABLET ORAL at 08:09

## 2024-09-23 RX ADMIN — PANTOPRAZOLE SODIUM 40 MG: 40 TABLET, DELAYED RELEASE ORAL at 10:09

## 2024-09-23 RX ADMIN — SODIUM BICARBONATE 650 MG TABLET 650 MG: at 10:09

## 2024-09-23 RX ADMIN — FUROSEMIDE 40 MG: 10 INJECTION, SOLUTION INTRAVENOUS at 10:09

## 2024-09-23 RX ADMIN — DOXYCYCLINE HYCLATE 100 MG: 100 TABLET, COATED ORAL at 08:09

## 2024-09-23 RX ADMIN — FOLIC ACID 1000 MCG: 1 TABLET ORAL at 10:09

## 2024-09-23 RX ADMIN — SEVELAMER CARBONATE 800 MG: 800 TABLET, FILM COATED ORAL at 12:09

## 2024-09-23 RX ADMIN — DOXYCYCLINE HYCLATE 100 MG: 100 TABLET, COATED ORAL at 10:09

## 2024-09-23 RX ADMIN — LEVOTHYROXINE SODIUM 137 MCG: 25 TABLET ORAL at 06:09

## 2024-09-23 RX ADMIN — MUPIROCIN: 20 OINTMENT TOPICAL at 08:09

## 2024-09-23 RX ADMIN — CEFTRIAXONE 1 G: 1 INJECTION, POWDER, FOR SOLUTION INTRAMUSCULAR; INTRAVENOUS at 02:09

## 2024-09-23 RX ADMIN — HYDROCORTISONE SODIUM SUCCINATE 50 MG: 100 INJECTION, POWDER, FOR SOLUTION INTRAMUSCULAR; INTRAVENOUS at 01:09

## 2024-09-23 RX ADMIN — FUROSEMIDE 40 MG: 10 INJECTION, SOLUTION INTRAVENOUS at 08:09

## 2024-09-23 RX ADMIN — MIDODRINE HYDROCHLORIDE 7.5 MG: 2.5 TABLET ORAL at 10:09

## 2024-09-23 RX ADMIN — SODIUM BICARBONATE 650 MG TABLET 650 MG: at 08:09

## 2024-09-23 RX ADMIN — MUPIROCIN: 20 OINTMENT TOPICAL at 10:09

## 2024-09-23 RX ADMIN — MIDODRINE HYDROCHLORIDE 7.5 MG: 2.5 TABLET ORAL at 03:09

## 2024-09-23 RX ADMIN — PANTOPRAZOLE SODIUM 40 MG: 40 TABLET, DELAYED RELEASE ORAL at 08:09

## 2024-09-23 RX ADMIN — TUBERCULIN PURIFIED PROTEIN DERIVATIVE 5 UNITS: 5 INJECTION, SOLUTION INTRADERMAL at 04:09

## 2024-09-24 PROBLEM — E87.20 METABOLIC ACIDOSIS: Status: RESOLVED | Noted: 2024-09-17 | Resolved: 2024-09-24

## 2024-09-24 LAB
ALBUMIN SERPL BCP-MCNC: 2 G/DL (ref 3.5–5.2)
ALP SERPL-CCNC: 47 U/L (ref 55–135)
ALT SERPL W/O P-5'-P-CCNC: 19 U/L (ref 10–44)
ANION GAP SERPL CALC-SCNC: 11 MMOL/L (ref 8–16)
AST SERPL-CCNC: 55 U/L (ref 10–40)
BASOPHILS # BLD AUTO: 0.01 K/UL (ref 0–0.2)
BASOPHILS NFR BLD: 0.1 % (ref 0–1.9)
BILIRUB SERPL-MCNC: 1.7 MG/DL (ref 0.1–1)
BUN SERPL-MCNC: 41 MG/DL (ref 8–23)
CALCIUM SERPL-MCNC: 7.4 MG/DL (ref 8.7–10.5)
CHLORIDE SERPL-SCNC: 102 MMOL/L (ref 95–110)
CO2 SERPL-SCNC: 19 MMOL/L (ref 23–29)
CREAT SERPL-MCNC: 2.7 MG/DL (ref 0.5–1.4)
DIFFERENTIAL METHOD BLD: ABNORMAL
EOSINOPHIL # BLD AUTO: 0 K/UL (ref 0–0.5)
EOSINOPHIL NFR BLD: 0.3 % (ref 0–8)
ERYTHROCYTE [DISTWIDTH] IN BLOOD BY AUTOMATED COUNT: 24.4 % (ref 11.5–14.5)
EST. GFR  (NO RACE VARIABLE): 18.3 ML/MIN/1.73 M^2
FINAL PATHOLOGIC DIAGNOSIS: NORMAL
GLUCOSE SERPL-MCNC: 87 MG/DL (ref 70–110)
GROSS: NORMAL
HCT VFR BLD AUTO: 22.4 % (ref 37–48.5)
HGB BLD-MCNC: 7.7 G/DL (ref 12–16)
IMM GRANULOCYTES # BLD AUTO: 0.17 K/UL (ref 0–0.04)
IMM GRANULOCYTES NFR BLD AUTO: 1.9 % (ref 0–0.5)
LYMPHOCYTES # BLD AUTO: 1.3 K/UL (ref 1–4.8)
LYMPHOCYTES NFR BLD: 14.7 % (ref 18–48)
Lab: NORMAL
MAGNESIUM SERPL-MCNC: 1.6 MG/DL (ref 1.6–2.6)
MCH RBC QN AUTO: 33.2 PG (ref 27–31)
MCHC RBC AUTO-ENTMCNC: 34.4 G/DL (ref 32–36)
MCV RBC AUTO: 97 FL (ref 82–98)
MICROSCOPIC EXAM: NORMAL
MONOCYTES # BLD AUTO: 0.7 K/UL (ref 0.3–1)
MONOCYTES NFR BLD: 7.6 % (ref 4–15)
NEUTROPHILS # BLD AUTO: 6.7 K/UL (ref 1.8–7.7)
NEUTROPHILS NFR BLD: 75.4 % (ref 38–73)
NRBC BLD-RTO: 1 /100 WBC
PHOSPHATE SERPL-MCNC: 5.6 MG/DL (ref 2.7–4.5)
PLATELET # BLD AUTO: 101 K/UL (ref 150–450)
PMV BLD AUTO: 9.4 FL (ref 9.2–12.9)
POTASSIUM SERPL-SCNC: 2.8 MMOL/L (ref 3.5–5.1)
PROT SERPL-MCNC: 5 G/DL (ref 6–8.4)
RBC # BLD AUTO: 2.32 M/UL (ref 4–5.4)
SODIUM SERPL-SCNC: 132 MMOL/L (ref 136–145)
WBC # BLD AUTO: 8.95 K/UL (ref 3.9–12.7)

## 2024-09-24 PROCEDURE — 63600175 PHARM REV CODE 636 W HCPCS: Performed by: FAMILY MEDICINE

## 2024-09-24 PROCEDURE — 94761 N-INVAS EAR/PLS OXIMETRY MLT: CPT

## 2024-09-24 PROCEDURE — 99233 SBSQ HOSP IP/OBS HIGH 50: CPT | Mod: ,,, | Performed by: STUDENT IN AN ORGANIZED HEALTH CARE EDUCATION/TRAINING PROGRAM

## 2024-09-24 PROCEDURE — 99900035 HC TECH TIME PER 15 MIN (STAT)

## 2024-09-24 PROCEDURE — 84100 ASSAY OF PHOSPHORUS: CPT | Performed by: STUDENT IN AN ORGANIZED HEALTH CARE EDUCATION/TRAINING PROGRAM

## 2024-09-24 PROCEDURE — 85025 COMPLETE CBC W/AUTO DIFF WBC: CPT | Performed by: STUDENT IN AN ORGANIZED HEALTH CARE EDUCATION/TRAINING PROGRAM

## 2024-09-24 PROCEDURE — 25000003 PHARM REV CODE 250: Performed by: STUDENT IN AN ORGANIZED HEALTH CARE EDUCATION/TRAINING PROGRAM

## 2024-09-24 PROCEDURE — 25000003 PHARM REV CODE 250: Performed by: FAMILY MEDICINE

## 2024-09-24 PROCEDURE — 80053 COMPREHEN METABOLIC PANEL: CPT | Performed by: STUDENT IN AN ORGANIZED HEALTH CARE EDUCATION/TRAINING PROGRAM

## 2024-09-24 PROCEDURE — 25000003 PHARM REV CODE 250: Performed by: SPECIALIST

## 2024-09-24 PROCEDURE — 97530 THERAPEUTIC ACTIVITIES: CPT

## 2024-09-24 PROCEDURE — 63600175 PHARM REV CODE 636 W HCPCS: Performed by: STUDENT IN AN ORGANIZED HEALTH CARE EDUCATION/TRAINING PROGRAM

## 2024-09-24 PROCEDURE — 25000003 PHARM REV CODE 250: Performed by: INTERNAL MEDICINE

## 2024-09-24 PROCEDURE — 83735 ASSAY OF MAGNESIUM: CPT | Performed by: STUDENT IN AN ORGANIZED HEALTH CARE EDUCATION/TRAINING PROGRAM

## 2024-09-24 PROCEDURE — 92610 EVALUATE SWALLOWING FUNCTION: CPT

## 2024-09-24 PROCEDURE — 21400001 HC TELEMETRY ROOM

## 2024-09-24 RX ORDER — DIPHENHYDRAMINE HCL 25 MG
25 CAPSULE ORAL EVERY 6 HOURS PRN
Status: DISCONTINUED | OUTPATIENT
Start: 2024-09-24 | End: 2024-09-27 | Stop reason: HOSPADM

## 2024-09-24 RX ORDER — FUROSEMIDE 10 MG/ML
40 INJECTION INTRAMUSCULAR; INTRAVENOUS ONCE
Status: COMPLETED | OUTPATIENT
Start: 2024-09-24 | End: 2024-09-24

## 2024-09-24 RX ORDER — MAGNESIUM SULFATE HEPTAHYDRATE 40 MG/ML
2 INJECTION, SOLUTION INTRAVENOUS ONCE
Status: COMPLETED | OUTPATIENT
Start: 2024-09-24 | End: 2024-09-24

## 2024-09-24 RX ADMIN — MIDODRINE HYDROCHLORIDE 7.5 MG: 2.5 TABLET ORAL at 10:09

## 2024-09-24 RX ADMIN — DOXYCYCLINE HYCLATE 100 MG: 100 TABLET, COATED ORAL at 08:09

## 2024-09-24 RX ADMIN — PANTOPRAZOLE SODIUM 40 MG: 40 TABLET, DELAYED RELEASE ORAL at 10:09

## 2024-09-24 RX ADMIN — MAGNESIUM SULFATE HEPTAHYDRATE 2 G: 40 INJECTION, SOLUTION INTRAVENOUS at 08:09

## 2024-09-24 RX ADMIN — POTASSIUM BICARBONATE 50 MEQ: 978 TABLET, EFFERVESCENT ORAL at 10:09

## 2024-09-24 RX ADMIN — SODIUM BICARBONATE 650 MG TABLET 650 MG: at 10:09

## 2024-09-24 RX ADMIN — ONDANSETRON 4 MG: 2 INJECTION INTRAMUSCULAR; INTRAVENOUS at 12:09

## 2024-09-24 RX ADMIN — CEFTRIAXONE 1 G: 1 INJECTION, POWDER, FOR SOLUTION INTRAMUSCULAR; INTRAVENOUS at 02:09

## 2024-09-24 RX ADMIN — HYDROCODONE BITARTRATE AND ACETAMINOPHEN 1 TABLET: 5; 325 TABLET ORAL at 05:09

## 2024-09-24 RX ADMIN — MIDODRINE HYDROCHLORIDE 7.5 MG: 2.5 TABLET ORAL at 04:09

## 2024-09-24 RX ADMIN — FOLIC ACID 1000 MCG: 1 TABLET ORAL at 10:09

## 2024-09-24 RX ADMIN — MORPHINE SULFATE 2 MG: 2 INJECTION, SOLUTION INTRAMUSCULAR; INTRAVENOUS at 08:09

## 2024-09-24 RX ADMIN — SODIUM BICARBONATE 650 MG TABLET 650 MG: at 08:09

## 2024-09-24 RX ADMIN — PANTOPRAZOLE SODIUM 40 MG: 40 TABLET, DELAYED RELEASE ORAL at 08:09

## 2024-09-24 RX ADMIN — DIPHENHYDRAMINE HYDROCHLORIDE 25 MG: 25 CAPSULE ORAL at 01:09

## 2024-09-24 RX ADMIN — DOXYCYCLINE HYCLATE 100 MG: 100 TABLET, COATED ORAL at 10:09

## 2024-09-24 RX ADMIN — MUPIROCIN: 20 OINTMENT TOPICAL at 10:09

## 2024-09-24 RX ADMIN — FUROSEMIDE 40 MG: 10 INJECTION, SOLUTION INTRAVENOUS at 12:09

## 2024-09-24 RX ADMIN — MUPIROCIN: 20 OINTMENT TOPICAL at 08:09

## 2024-09-24 RX ADMIN — MIDODRINE HYDROCHLORIDE 7.5 MG: 2.5 TABLET ORAL at 08:09

## 2024-09-24 RX ADMIN — VANCOMYCIN HYDROCHLORIDE 1250 MG: 1.25 INJECTION, POWDER, LYOPHILIZED, FOR SOLUTION INTRAVENOUS at 12:09

## 2024-09-24 RX ADMIN — LEVOTHYROXINE SODIUM 137 MCG: 25 TABLET ORAL at 06:09

## 2024-09-25 LAB
ALBUMIN SERPL BCP-MCNC: 2 G/DL (ref 3.5–5.2)
ALP SERPL-CCNC: 56 U/L (ref 55–135)
ALT SERPL W/O P-5'-P-CCNC: 18 U/L (ref 10–44)
ANION GAP SERPL CALC-SCNC: 13 MMOL/L (ref 8–16)
AST SERPL-CCNC: 52 U/L (ref 10–40)
BASOPHILS # BLD AUTO: 0.01 K/UL (ref 0–0.2)
BASOPHILS NFR BLD: 0.1 % (ref 0–1.9)
BILIRUB SERPL-MCNC: 1.7 MG/DL (ref 0.1–1)
BUN SERPL-MCNC: 41 MG/DL (ref 8–23)
CALCIUM SERPL-MCNC: 7.7 MG/DL (ref 8.7–10.5)
CHLORIDE SERPL-SCNC: 99 MMOL/L (ref 95–110)
CO2 SERPL-SCNC: 21 MMOL/L (ref 23–29)
CREAT SERPL-MCNC: 2.6 MG/DL (ref 0.5–1.4)
DIFFERENTIAL METHOD BLD: ABNORMAL
EOSINOPHIL # BLD AUTO: 0.1 K/UL (ref 0–0.5)
EOSINOPHIL NFR BLD: 1.3 % (ref 0–8)
ERYTHROCYTE [DISTWIDTH] IN BLOOD BY AUTOMATED COUNT: 23.9 % (ref 11.5–14.5)
EST. GFR  (NO RACE VARIABLE): 19.1 ML/MIN/1.73 M^2
GLUCOSE SERPL-MCNC: 93 MG/DL (ref 70–110)
HCT VFR BLD AUTO: 23.5 % (ref 37–48.5)
HGB BLD-MCNC: 8.2 G/DL (ref 12–16)
IMM GRANULOCYTES # BLD AUTO: 0.14 K/UL (ref 0–0.04)
IMM GRANULOCYTES NFR BLD AUTO: 1.3 % (ref 0–0.5)
LYMPHOCYTES # BLD AUTO: 1.2 K/UL (ref 1–4.8)
LYMPHOCYTES NFR BLD: 11.1 % (ref 18–48)
MAGNESIUM SERPL-MCNC: 1.8 MG/DL (ref 1.6–2.6)
MCH RBC QN AUTO: 33.7 PG (ref 27–31)
MCHC RBC AUTO-ENTMCNC: 34.9 G/DL (ref 32–36)
MCV RBC AUTO: 97 FL (ref 82–98)
MONOCYTES # BLD AUTO: 0.9 K/UL (ref 0.3–1)
MONOCYTES NFR BLD: 8.2 % (ref 4–15)
NEUTROPHILS # BLD AUTO: 8.7 K/UL (ref 1.8–7.7)
NEUTROPHILS NFR BLD: 78 % (ref 38–73)
NRBC BLD-RTO: 0 /100 WBC
PHOSPHATE SERPL-MCNC: 5 MG/DL (ref 2.7–4.5)
PLATELET # BLD AUTO: 87 K/UL (ref 150–450)
PMV BLD AUTO: 10.2 FL (ref 9.2–12.9)
POTASSIUM SERPL-SCNC: 3 MMOL/L (ref 3.5–5.1)
PROT SERPL-MCNC: 4.9 G/DL (ref 6–8.4)
RBC # BLD AUTO: 2.43 M/UL (ref 4–5.4)
SODIUM SERPL-SCNC: 133 MMOL/L (ref 136–145)
VANCOMYCIN SERPL-MCNC: 25 UG/ML
WBC # BLD AUTO: 11.16 K/UL (ref 3.9–12.7)

## 2024-09-25 PROCEDURE — 99233 SBSQ HOSP IP/OBS HIGH 50: CPT | Mod: ,,, | Performed by: STUDENT IN AN ORGANIZED HEALTH CARE EDUCATION/TRAINING PROGRAM

## 2024-09-25 PROCEDURE — 84100 ASSAY OF PHOSPHORUS: CPT | Performed by: STUDENT IN AN ORGANIZED HEALTH CARE EDUCATION/TRAINING PROGRAM

## 2024-09-25 PROCEDURE — 87070 CULTURE OTHR SPECIMN AEROBIC: CPT | Performed by: STUDENT IN AN ORGANIZED HEALTH CARE EDUCATION/TRAINING PROGRAM

## 2024-09-25 PROCEDURE — 80053 COMPREHEN METABOLIC PANEL: CPT | Performed by: STUDENT IN AN ORGANIZED HEALTH CARE EDUCATION/TRAINING PROGRAM

## 2024-09-25 PROCEDURE — 63600175 PHARM REV CODE 636 W HCPCS: Performed by: STUDENT IN AN ORGANIZED HEALTH CARE EDUCATION/TRAINING PROGRAM

## 2024-09-25 PROCEDURE — 94761 N-INVAS EAR/PLS OXIMETRY MLT: CPT

## 2024-09-25 PROCEDURE — 25000003 PHARM REV CODE 250: Performed by: FAMILY MEDICINE

## 2024-09-25 PROCEDURE — 80202 ASSAY OF VANCOMYCIN: CPT | Performed by: STUDENT IN AN ORGANIZED HEALTH CARE EDUCATION/TRAINING PROGRAM

## 2024-09-25 PROCEDURE — 99900035 HC TECH TIME PER 15 MIN (STAT)

## 2024-09-25 PROCEDURE — 94760 N-INVAS EAR/PLS OXIMETRY 1: CPT

## 2024-09-25 PROCEDURE — 85025 COMPLETE CBC W/AUTO DIFF WBC: CPT | Performed by: STUDENT IN AN ORGANIZED HEALTH CARE EDUCATION/TRAINING PROGRAM

## 2024-09-25 PROCEDURE — 25000003 PHARM REV CODE 250: Performed by: SPECIALIST

## 2024-09-25 PROCEDURE — 25000003 PHARM REV CODE 250: Performed by: STUDENT IN AN ORGANIZED HEALTH CARE EDUCATION/TRAINING PROGRAM

## 2024-09-25 PROCEDURE — 87106 FUNGI IDENTIFICATION YEAST: CPT | Performed by: STUDENT IN AN ORGANIZED HEALTH CARE EDUCATION/TRAINING PROGRAM

## 2024-09-25 PROCEDURE — 63600175 PHARM REV CODE 636 W HCPCS: Performed by: SPECIALIST

## 2024-09-25 PROCEDURE — 83735 ASSAY OF MAGNESIUM: CPT | Performed by: STUDENT IN AN ORGANIZED HEALTH CARE EDUCATION/TRAINING PROGRAM

## 2024-09-25 PROCEDURE — 25000003 PHARM REV CODE 250: Performed by: INTERNAL MEDICINE

## 2024-09-25 PROCEDURE — 36415 COLL VENOUS BLD VENIPUNCTURE: CPT | Performed by: STUDENT IN AN ORGANIZED HEALTH CARE EDUCATION/TRAINING PROGRAM

## 2024-09-25 PROCEDURE — 21400001 HC TELEMETRY ROOM

## 2024-09-25 RX ORDER — MIDODRINE HYDROCHLORIDE 5 MG/1
5 TABLET ORAL 3 TIMES DAILY
Status: DISCONTINUED | OUTPATIENT
Start: 2024-09-25 | End: 2024-09-26

## 2024-09-25 RX ADMIN — FOLIC ACID 1000 MCG: 1 TABLET ORAL at 08:09

## 2024-09-25 RX ADMIN — SEVELAMER CARBONATE 800 MG: 800 TABLET, FILM COATED ORAL at 08:09

## 2024-09-25 RX ADMIN — SEVELAMER CARBONATE 800 MG: 800 TABLET, FILM COATED ORAL at 06:09

## 2024-09-25 RX ADMIN — POTASSIUM BICARBONATE 25 MEQ: 978 TABLET, EFFERVESCENT ORAL at 08:09

## 2024-09-25 RX ADMIN — HYDROMORPHONE HYDROCHLORIDE 0.5 MG: 1 INJECTION, SOLUTION INTRAMUSCULAR; INTRAVENOUS; SUBCUTANEOUS at 02:09

## 2024-09-25 RX ADMIN — DOXYCYCLINE HYCLATE 100 MG: 100 TABLET, COATED ORAL at 08:09

## 2024-09-25 RX ADMIN — MIDODRINE HYDROCHLORIDE 5 MG: 5 TABLET ORAL at 08:09

## 2024-09-25 RX ADMIN — LEVOTHYROXINE SODIUM 137 MCG: 25 TABLET ORAL at 06:09

## 2024-09-25 RX ADMIN — MUPIROCIN: 20 OINTMENT TOPICAL at 09:09

## 2024-09-25 RX ADMIN — SODIUM BICARBONATE 650 MG TABLET 650 MG: at 08:09

## 2024-09-25 RX ADMIN — PANTOPRAZOLE SODIUM 40 MG: 40 TABLET, DELAYED RELEASE ORAL at 08:09

## 2024-09-25 RX ADMIN — SEVELAMER CARBONATE 800 MG: 800 TABLET, FILM COATED ORAL at 12:09

## 2024-09-25 RX ADMIN — HYDROCODONE BITARTRATE AND ACETAMINOPHEN 1 TABLET: 5; 325 TABLET ORAL at 01:09

## 2024-09-25 RX ADMIN — CEFTRIAXONE 1 G: 1 INJECTION, POWDER, FOR SOLUTION INTRAMUSCULAR; INTRAVENOUS at 12:09

## 2024-09-26 DIAGNOSIS — Z78.0 MENOPAUSE: ICD-10-CM

## 2024-09-26 PROBLEM — E88.09 HYPOALBUMINEMIA: Status: ACTIVE | Noted: 2024-09-26

## 2024-09-26 PROBLEM — R53.82 CHRONIC FATIGUE: Status: ACTIVE | Noted: 2024-08-16

## 2024-09-26 PROBLEM — R74.01 TRANSAMINITIS: Status: ACTIVE | Noted: 2024-09-26

## 2024-09-26 LAB
ALBUMIN SERPL BCP-MCNC: 1.9 G/DL (ref 3.5–5.2)
ALP SERPL-CCNC: 86 U/L (ref 55–135)
ALT SERPL W/O P-5'-P-CCNC: 30 U/L (ref 10–44)
ANION GAP SERPL CALC-SCNC: 13 MMOL/L (ref 8–16)
AST SERPL-CCNC: 89 U/L (ref 10–40)
BASOPHILS # BLD AUTO: 0.02 K/UL (ref 0–0.2)
BASOPHILS NFR BLD: 0.1 % (ref 0–1.9)
BILIRUB SERPL-MCNC: 5.2 MG/DL (ref 0.1–1)
BUN SERPL-MCNC: 37 MG/DL (ref 8–23)
CALCIUM SERPL-MCNC: 8.3 MG/DL (ref 8.7–10.5)
CHLORIDE SERPL-SCNC: 98 MMOL/L (ref 95–110)
CO2 SERPL-SCNC: 21 MMOL/L (ref 23–29)
CREAT SERPL-MCNC: 2.3 MG/DL (ref 0.5–1.4)
DIFFERENTIAL METHOD BLD: ABNORMAL
EOSINOPHIL # BLD AUTO: 0.1 K/UL (ref 0–0.5)
EOSINOPHIL NFR BLD: 0.4 % (ref 0–8)
ERYTHROCYTE [DISTWIDTH] IN BLOOD BY AUTOMATED COUNT: 23.6 % (ref 11.5–14.5)
EST. GFR  (NO RACE VARIABLE): 22.2 ML/MIN/1.73 M^2
GLUCOSE SERPL-MCNC: 99 MG/DL (ref 70–110)
HCT VFR BLD AUTO: 25.8 % (ref 37–48.5)
HGB BLD-MCNC: 8.9 G/DL (ref 12–16)
IMM GRANULOCYTES # BLD AUTO: 0.18 K/UL (ref 0–0.04)
IMM GRANULOCYTES NFR BLD AUTO: 1 % (ref 0–0.5)
LIPASE SERPL-CCNC: 37 U/L (ref 4–60)
LYMPHOCYTES # BLD AUTO: 0.8 K/UL (ref 1–4.8)
LYMPHOCYTES NFR BLD: 4.5 % (ref 18–48)
MAGNESIUM SERPL-MCNC: 1.6 MG/DL (ref 1.6–2.6)
MCH RBC QN AUTO: 33.6 PG (ref 27–31)
MCHC RBC AUTO-ENTMCNC: 34.5 G/DL (ref 32–36)
MCV RBC AUTO: 97 FL (ref 82–98)
MONOCYTES # BLD AUTO: 0.8 K/UL (ref 0.3–1)
MONOCYTES NFR BLD: 4.2 % (ref 4–15)
NEUTROPHILS # BLD AUTO: 16.7 K/UL (ref 1.8–7.7)
NEUTROPHILS NFR BLD: 89.8 % (ref 38–73)
NRBC BLD-RTO: 0 /100 WBC
PHOSPHATE SERPL-MCNC: 4.4 MG/DL (ref 2.7–4.5)
PLATELET # BLD AUTO: 69 K/UL (ref 150–450)
PMV BLD AUTO: 9.6 FL (ref 9.2–12.9)
POTASSIUM SERPL-SCNC: 2.7 MMOL/L (ref 3.5–5.1)
PROT SERPL-MCNC: 4.7 G/DL (ref 6–8.4)
RBC # BLD AUTO: 2.65 M/UL (ref 4–5.4)
SODIUM SERPL-SCNC: 132 MMOL/L (ref 136–145)
VANCOMYCIN SERPL-MCNC: 21.5 UG/ML
WBC # BLD AUTO: 18.54 K/UL (ref 3.9–12.7)

## 2024-09-26 PROCEDURE — 25000003 PHARM REV CODE 250: Performed by: STUDENT IN AN ORGANIZED HEALTH CARE EDUCATION/TRAINING PROGRAM

## 2024-09-26 PROCEDURE — 94760 N-INVAS EAR/PLS OXIMETRY 1: CPT

## 2024-09-26 PROCEDURE — 83735 ASSAY OF MAGNESIUM: CPT | Performed by: STUDENT IN AN ORGANIZED HEALTH CARE EDUCATION/TRAINING PROGRAM

## 2024-09-26 PROCEDURE — 83690 ASSAY OF LIPASE: CPT | Performed by: STUDENT IN AN ORGANIZED HEALTH CARE EDUCATION/TRAINING PROGRAM

## 2024-09-26 PROCEDURE — 80202 ASSAY OF VANCOMYCIN: CPT | Performed by: STUDENT IN AN ORGANIZED HEALTH CARE EDUCATION/TRAINING PROGRAM

## 2024-09-26 PROCEDURE — 94761 N-INVAS EAR/PLS OXIMETRY MLT: CPT

## 2024-09-26 PROCEDURE — 80053 COMPREHEN METABOLIC PANEL: CPT | Performed by: STUDENT IN AN ORGANIZED HEALTH CARE EDUCATION/TRAINING PROGRAM

## 2024-09-26 PROCEDURE — 99233 SBSQ HOSP IP/OBS HIGH 50: CPT | Mod: ,,, | Performed by: STUDENT IN AN ORGANIZED HEALTH CARE EDUCATION/TRAINING PROGRAM

## 2024-09-26 PROCEDURE — 63600175 PHARM REV CODE 636 W HCPCS: Performed by: STUDENT IN AN ORGANIZED HEALTH CARE EDUCATION/TRAINING PROGRAM

## 2024-09-26 PROCEDURE — 25000003 PHARM REV CODE 250: Performed by: FAMILY MEDICINE

## 2024-09-26 PROCEDURE — 63600175 PHARM REV CODE 636 W HCPCS: Performed by: SPECIALIST

## 2024-09-26 PROCEDURE — 21400001 HC TELEMETRY ROOM

## 2024-09-26 PROCEDURE — 84100 ASSAY OF PHOSPHORUS: CPT | Performed by: STUDENT IN AN ORGANIZED HEALTH CARE EDUCATION/TRAINING PROGRAM

## 2024-09-26 PROCEDURE — 63600175 PHARM REV CODE 636 W HCPCS: Performed by: FAMILY MEDICINE

## 2024-09-26 PROCEDURE — 85025 COMPLETE CBC W/AUTO DIFF WBC: CPT | Performed by: STUDENT IN AN ORGANIZED HEALTH CARE EDUCATION/TRAINING PROGRAM

## 2024-09-26 RX ORDER — POTASSIUM CHLORIDE 20 MEQ/1
40 TABLET, EXTENDED RELEASE ORAL ONCE
Status: DISCONTINUED | OUTPATIENT
Start: 2024-09-26 | End: 2024-09-26

## 2024-09-26 RX ORDER — MIDODRINE HYDROCHLORIDE 5 MG/1
5 TABLET ORAL 3 TIMES DAILY
Status: DISCONTINUED | OUTPATIENT
Start: 2024-09-26 | End: 2024-09-27 | Stop reason: HOSPADM

## 2024-09-26 RX ORDER — MAGNESIUM SULFATE HEPTAHYDRATE 40 MG/ML
2 INJECTION, SOLUTION INTRAVENOUS ONCE
Status: COMPLETED | OUTPATIENT
Start: 2024-09-26 | End: 2024-09-26

## 2024-09-26 RX ORDER — POLYETHYLENE GLYCOL 3350 17 G/17G
17 POWDER, FOR SOLUTION ORAL DAILY
Status: DISCONTINUED | OUTPATIENT
Start: 2024-09-26 | End: 2024-09-27 | Stop reason: HOSPADM

## 2024-09-26 RX ORDER — POTASSIUM CHLORIDE 7.45 MG/ML
10 INJECTION INTRAVENOUS
Status: DISPENSED | OUTPATIENT
Start: 2024-09-26 | End: 2024-09-26

## 2024-09-26 RX ORDER — METRONIDAZOLE 500 MG/100ML
500 INJECTION, SOLUTION INTRAVENOUS
Status: DISCONTINUED | OUTPATIENT
Start: 2024-09-26 | End: 2024-09-27 | Stop reason: HOSPADM

## 2024-09-26 RX ADMIN — THERA TABS 1 TABLET: TAB at 11:09

## 2024-09-26 RX ADMIN — SEVELAMER CARBONATE 800 MG: 800 TABLET, FILM COATED ORAL at 07:09

## 2024-09-26 RX ADMIN — MAGNESIUM SULFATE HEPTAHYDRATE 2 G: 40 INJECTION, SOLUTION INTRAVENOUS at 03:09

## 2024-09-26 RX ADMIN — SODIUM BICARBONATE 650 MG TABLET 650 MG: at 09:09

## 2024-09-26 RX ADMIN — CEFEPIME 1 G: 1 INJECTION, POWDER, FOR SOLUTION INTRAMUSCULAR; INTRAVENOUS at 07:09

## 2024-09-26 RX ADMIN — POTASSIUM CHLORIDE 10 MEQ: 10 INJECTION, SOLUTION INTRAVENOUS at 09:09

## 2024-09-26 RX ADMIN — POTASSIUM BICARBONATE 25 MEQ: 978 TABLET, EFFERVESCENT ORAL at 07:09

## 2024-09-26 RX ADMIN — HYDROMORPHONE HYDROCHLORIDE 0.5 MG: 1 INJECTION, SOLUTION INTRAMUSCULAR; INTRAVENOUS; SUBCUTANEOUS at 07:09

## 2024-09-26 RX ADMIN — POLYETHYLENE GLYCOL (3350) 17 G: 17 POWDER, FOR SOLUTION ORAL at 11:09

## 2024-09-26 RX ADMIN — METRONIDAZOLE 500 MG: 5 INJECTION, SOLUTION INTRAVENOUS at 09:09

## 2024-09-26 RX ADMIN — MORPHINE SULFATE 2 MG: 2 INJECTION, SOLUTION INTRAMUSCULAR; INTRAVENOUS at 05:09

## 2024-09-26 RX ADMIN — LEVOTHYROXINE SODIUM 137 MCG: 25 TABLET ORAL at 05:09

## 2024-09-26 RX ADMIN — POTASSIUM CHLORIDE 10 MEQ: 10 INJECTION, SOLUTION INTRAVENOUS at 11:09

## 2024-09-26 RX ADMIN — METRONIDAZOLE 500 MG: 5 INJECTION, SOLUTION INTRAVENOUS at 12:09

## 2024-09-26 RX ADMIN — ONDANSETRON 4 MG: 2 INJECTION INTRAMUSCULAR; INTRAVENOUS at 04:09

## 2024-09-26 RX ADMIN — MORPHINE SULFATE 2 MG: 2 INJECTION, SOLUTION INTRAMUSCULAR; INTRAVENOUS at 12:09

## 2024-09-26 RX ADMIN — SEVELAMER CARBONATE 800 MG: 800 TABLET, FILM COATED ORAL at 11:09

## 2024-09-26 RX ADMIN — MIDODRINE HYDROCHLORIDE 5 MG: 5 TABLET ORAL at 03:09

## 2024-09-26 RX ADMIN — MORPHINE SULFATE 2 MG: 2 INJECTION, SOLUTION INTRAMUSCULAR; INTRAVENOUS at 07:09

## 2024-09-26 RX ADMIN — HYDROMORPHONE HYDROCHLORIDE 0.5 MG: 1 INJECTION, SOLUTION INTRAMUSCULAR; INTRAVENOUS; SUBCUTANEOUS at 04:09

## 2024-09-27 VITALS
SYSTOLIC BLOOD PRESSURE: 104 MMHG | WEIGHT: 178.56 LBS | HEIGHT: 62 IN | TEMPERATURE: 97 F | OXYGEN SATURATION: 99 % | RESPIRATION RATE: 19 BRPM | HEART RATE: 80 BPM | BODY MASS INDEX: 32.86 KG/M2 | DIASTOLIC BLOOD PRESSURE: 52 MMHG

## 2024-09-27 LAB — VANCOMYCIN SERPL-MCNC: 18 UG/ML

## 2024-09-27 PROCEDURE — 63600175 PHARM REV CODE 636 W HCPCS: Performed by: FAMILY MEDICINE

## 2024-09-27 PROCEDURE — 99239 HOSP IP/OBS DSCHRG MGMT >30: CPT | Mod: ,,, | Performed by: INTERNAL MEDICINE

## 2024-09-27 PROCEDURE — 25000003 PHARM REV CODE 250: Performed by: STUDENT IN AN ORGANIZED HEALTH CARE EDUCATION/TRAINING PROGRAM

## 2024-09-27 PROCEDURE — 94761 N-INVAS EAR/PLS OXIMETRY MLT: CPT

## 2024-09-27 PROCEDURE — 63600175 PHARM REV CODE 636 W HCPCS: Mod: JZ,JG | Performed by: STUDENT IN AN ORGANIZED HEALTH CARE EDUCATION/TRAINING PROGRAM

## 2024-09-27 PROCEDURE — 80202 ASSAY OF VANCOMYCIN: CPT | Performed by: STUDENT IN AN ORGANIZED HEALTH CARE EDUCATION/TRAINING PROGRAM

## 2024-09-27 RX ORDER — LEVOTHYROXINE SODIUM 137 UG/1
137 TABLET ORAL
Qty: 30 TABLET | Refills: 0 | Status: SHIPPED | OUTPATIENT
Start: 2024-09-28 | End: 2024-10-28

## 2024-09-27 RX ADMIN — CEFEPIME 1 G: 1 INJECTION, POWDER, FOR SOLUTION INTRAMUSCULAR; INTRAVENOUS at 07:09

## 2024-09-27 RX ADMIN — METRONIDAZOLE 500 MG: 5 INJECTION, SOLUTION INTRAVENOUS at 04:09

## 2024-09-27 RX ADMIN — MORPHINE SULFATE 2 MG: 2 INJECTION, SOLUTION INTRAMUSCULAR; INTRAVENOUS at 12:09

## 2024-09-27 RX ADMIN — MORPHINE SULFATE 2 MG: 2 INJECTION, SOLUTION INTRAMUSCULAR; INTRAVENOUS at 07:09

## 2024-09-28 LAB — BACTERIA SPEC AEROBE CULT: ABNORMAL

## 2024-09-30 ENCOUNTER — OUTPATIENT CASE MANAGEMENT (OUTPATIENT)
Dept: ADMINISTRATIVE | Facility: OTHER | Age: 69
End: 2024-09-30
Payer: MEDICARE

## 2024-09-30 NOTE — PROGRESS NOTES
09/30/24-Discharged home with [facility] on 09/27/24. OPCM Case Closure.

## 2024-10-18 ENCOUNTER — EXTERNAL HOME HEALTH (OUTPATIENT)
Dept: HOME HEALTH SERVICES | Facility: HOSPITAL | Age: 69
End: 2024-10-18
Payer: MEDICARE

## 2024-10-18 ENCOUNTER — DOCUMENT SCAN (OUTPATIENT)
Dept: HOME HEALTH SERVICES | Facility: HOSPITAL | Age: 69
End: 2024-10-18
Payer: MEDICARE

## 2024-11-18 ENCOUNTER — DOCUMENT SCAN (OUTPATIENT)
Dept: HOME HEALTH SERVICES | Facility: HOSPITAL | Age: 69
End: 2024-11-18
Payer: MEDICARE

## 2025-01-30 DIAGNOSIS — Z00.00 ENCOUNTER FOR MEDICARE ANNUAL WELLNESS EXAM: ICD-10-CM

## (undated) DEVICE — GLOVE BIOGEL ECLIPSE SZ 7.5

## (undated) DEVICE — TRAY SKIN SCRUB WET PREMIUM

## (undated) DEVICE — Device

## (undated) DEVICE — PAD ABDOMINAL STERILE 8X10IN

## (undated) DEVICE — GLOVE SENSICARE PI SURG 6.5

## (undated) DEVICE — SPONGE GAUZE 16PLY 4X4

## (undated) DEVICE — GLOVE SENSICARE PI GRN 7

## (undated) DEVICE — PENCIL ZIP PEN SMOKE EVAC 10FT

## (undated) DEVICE — BANDAGE ROLL COTTN 4.5INX4.1YD

## (undated) DEVICE — GLOVE SENSICARE PI SURG 7

## (undated) DEVICE — CANISTER SUCTION RIGID 3000CC

## (undated) DEVICE — ELECTRODE REM PLYHSV RETURN 9